# Patient Record
Sex: FEMALE | Race: WHITE | NOT HISPANIC OR LATINO | Employment: OTHER | URBAN - NONMETROPOLITAN AREA
[De-identification: names, ages, dates, MRNs, and addresses within clinical notes are randomized per-mention and may not be internally consistent; named-entity substitution may affect disease eponyms.]

---

## 2023-02-27 LAB
ALANINE AMINOTRANSFERASE (SGPT) (U/L) IN SER/PLAS: 57 U/L (ref 7–45)
ALBUMIN (G/DL) IN SER/PLAS: 4.2 G/DL (ref 3.4–5)
ALKALINE PHOSPHATASE (U/L) IN SER/PLAS: 116 U/L (ref 33–136)
ANION GAP IN SER/PLAS: 16 MMOL/L (ref 10–20)
ASPARTATE AMINOTRANSFERASE (SGOT) (U/L) IN SER/PLAS: 53 U/L (ref 9–39)
BILIRUBIN TOTAL (MG/DL) IN SER/PLAS: 0.7 MG/DL (ref 0–1.2)
C REACTIVE PROTEIN (MG/L) IN SER/PLAS: 0.81 MG/DL
CALCIDIOL (25 OH VITAMIN D3) (NG/ML) IN SER/PLAS: 33 NG/ML
CALCIUM (MG/DL) IN SER/PLAS: 10.6 MG/DL (ref 8.6–10.3)
CARBON DIOXIDE, TOTAL (MMOL/L) IN SER/PLAS: 25 MMOL/L (ref 21–32)
CHLORIDE (MMOL/L) IN SER/PLAS: 101 MMOL/L (ref 98–107)
COMPLEMENT C3 (MG/DL) IN SER/PLAS: 206 MG/DL (ref 87–200)
COMPLEMENT C4 (MG/DL) IN SER/PLAS: 30 MG/DL (ref 10–50)
CREATININE (MG/DL) IN SER/PLAS: 0.97 MG/DL (ref 0.5–1.05)
CREATININE (MG/DL) IN URINE: 92.4 MG/DL (ref 20–320)
ERYTHROCYTE DISTRIBUTION WIDTH (RATIO) BY AUTOMATED COUNT: 13.7 % (ref 11.5–14.5)
ERYTHROCYTE MEAN CORPUSCULAR HEMOGLOBIN CONCENTRATION (G/DL) BY AUTOMATED: 32.7 G/DL (ref 32–36)
ERYTHROCYTE MEAN CORPUSCULAR VOLUME (FL) BY AUTOMATED COUNT: 94 FL (ref 80–100)
ERYTHROCYTES (10*6/UL) IN BLOOD BY AUTOMATED COUNT: 4.52 X10E12/L (ref 4–5.2)
GFR FEMALE: 62 ML/MIN/1.73M2
GLUCOSE (MG/DL) IN SER/PLAS: 166 MG/DL (ref 74–99)
HEMATOCRIT (%) IN BLOOD BY AUTOMATED COUNT: 42.5 % (ref 36–46)
HEMOGLOBIN (G/DL) IN BLOOD: 13.9 G/DL (ref 12–16)
LEUKOCYTES (10*3/UL) IN BLOOD BY AUTOMATED COUNT: 8 X10E9/L (ref 4.4–11.3)
PARATHYRIN INTACT (PG/ML) IN SER/PLAS: 16.7 PG/ML (ref 18.5–88)
PLATELETS (10*3/UL) IN BLOOD AUTOMATED COUNT: 273 X10E9/L (ref 150–450)
POTASSIUM (MMOL/L) IN SER/PLAS: 5.1 MMOL/L (ref 3.5–5.3)
PROTEIN (MG/DL) IN URINE: 59 MG/DL (ref 5–24)
PROTEIN TOTAL: 7.9 G/DL (ref 6.4–8.2)
PROTEIN/CREATININE (MG/MG) IN URINE: 0.64 MG/MG CREAT (ref 0–0.17)
SEDIMENTATION RATE, ERYTHROCYTE: 45 MM/H (ref 0–30)
SODIUM (MMOL/L) IN SER/PLAS: 137 MMOL/L (ref 136–145)
UREA NITROGEN (MG/DL) IN SER/PLAS: 33 MG/DL (ref 6–23)

## 2023-02-28 LAB
ANA PATTERN: ABNORMAL
ANA TITER: ABNORMAL
ANTI-CENTROMERE: <0.2 AI
ANTI-CHROMATIN: 0.2 AI
ANTI-DNA (DS): 1 IU/ML
ANTI-JO-1 IGG: <0.2 AI
ANTI-NUCLEAR ANTIBODY (ANA): POSITIVE
ANTI-RIBOSOMAL P: <0.2 AI
ANTI-RNP: 0.5 AI
ANTI-SCL-70: <0.2 AI
ANTI-SM/RNP: <0.2 AI
ANTI-SM: <0.2 AI
ANTI-SSA: <0.2 AI
ANTI-SSB: <0.2 AI

## 2023-03-20 LAB
ALANINE AMINOTRANSFERASE (SGPT) (U/L) IN SER/PLAS: 55 U/L (ref 7–45)
ALBUMIN (G/DL) IN SER/PLAS: 3.8 G/DL (ref 3.4–5)
ALKALINE PHOSPHATASE (U/L) IN SER/PLAS: 144 U/L (ref 33–136)
ASPARTATE AMINOTRANSFERASE (SGOT) (U/L) IN SER/PLAS: 52 U/L (ref 9–39)
BILIRUBIN DIRECT (MG/DL) IN SER/PLAS: 0.2 MG/DL (ref 0–0.3)
BILIRUBIN TOTAL (MG/DL) IN SER/PLAS: 0.7 MG/DL (ref 0–1.2)
HEPATITIS A VIRUS IGM AB PRESENCE IN SER/PLAS BY IMMUNOASSAY: NONREACTIVE
HEPATITIS B VIRUS CORE IGM AB PRESENCE IN SER/PLAS BY IMMUNOASSY: NONREACTIVE
HEPATITIS B VIRUS SURFACE AG PRESENCE IN SERUM: NONREACTIVE
HEPATITIS C VIRUS AB PRESENCE IN SERUM: NONREACTIVE
POC CALCIUM IONIZED (MMOL/L) IN BLOOD: 1.21 MMOL/L (ref 1.1–1.33)
PROTEIN TOTAL: 7.9 G/DL (ref 6.4–8.2)

## 2023-03-21 LAB
ANTI-SMOOTH MUSCLE ANTIBODY: NEGATIVE
MITOCHONDRIAL ANTIBODY: NEGATIVE

## 2023-03-23 LAB — VITAMIN D 1,25-DIHYDROXY: 21.8 PG/ML (ref 19.9–79.3)

## 2023-03-24 LAB — PTH-RELATED PEPTIDE, PLASMA: 0.9 PMOL/L

## 2023-04-10 DIAGNOSIS — I15.9 SECONDARY HYPERTENSION: ICD-10-CM

## 2023-04-17 RX ORDER — METOPROLOL TARTRATE 100 MG/1
TABLET ORAL
Qty: 180 TABLET | Refills: 0 | Status: SHIPPED | OUTPATIENT
Start: 2023-04-17 | End: 2023-04-18 | Stop reason: SDUPTHER

## 2023-04-18 ENCOUNTER — OFFICE VISIT (OUTPATIENT)
Dept: PRIMARY CARE | Facility: CLINIC | Age: 73
End: 2023-04-18
Payer: MEDICARE

## 2023-04-18 VITALS
WEIGHT: 228 LBS | HEART RATE: 79 BPM | OXYGEN SATURATION: 97 % | DIASTOLIC BLOOD PRESSURE: 84 MMHG | BODY MASS INDEX: 37.99 KG/M2 | HEIGHT: 65 IN | RESPIRATION RATE: 18 BRPM | TEMPERATURE: 96.4 F | SYSTOLIC BLOOD PRESSURE: 132 MMHG

## 2023-04-18 DIAGNOSIS — E78.5 HYPERLIPIDEMIA, UNSPECIFIED HYPERLIPIDEMIA TYPE: ICD-10-CM

## 2023-04-18 DIAGNOSIS — M10.9 GOUT, UNSPECIFIED CAUSE, UNSPECIFIED CHRONICITY, UNSPECIFIED SITE: ICD-10-CM

## 2023-04-18 DIAGNOSIS — M62.838 MUSCLE SPASM: Primary | ICD-10-CM

## 2023-04-18 DIAGNOSIS — Z00.00 HEALTH CARE MAINTENANCE: ICD-10-CM

## 2023-04-18 DIAGNOSIS — I10 BENIGN ESSENTIAL HTN: ICD-10-CM

## 2023-04-18 DIAGNOSIS — J98.4 RESTRICTIVE LUNG DISEASE: ICD-10-CM

## 2023-04-18 DIAGNOSIS — K76.0 FATTY LIVER DISEASE, NONALCOHOLIC: ICD-10-CM

## 2023-04-18 DIAGNOSIS — R26.81 UNSTEADY: ICD-10-CM

## 2023-04-18 DIAGNOSIS — E03.9 HYPOTHYROIDISM, ADULT: ICD-10-CM

## 2023-04-18 DIAGNOSIS — M32.9 LUPUS (MULTI): ICD-10-CM

## 2023-04-18 DIAGNOSIS — M17.9 OSTEOARTHRITIS OF KNEE, UNSPECIFIED LATERALITY, UNSPECIFIED OSTEOARTHRITIS TYPE: ICD-10-CM

## 2023-04-18 DIAGNOSIS — E11.9 TYPE 2 DIABETES MELLITUS WITHOUT COMPLICATION, WITHOUT LONG-TERM CURRENT USE OF INSULIN (MULTI): ICD-10-CM

## 2023-04-18 PROBLEM — E87.5 HYPERKALEMIA: Status: RESOLVED | Noted: 2023-04-18 | Resolved: 2023-04-18

## 2023-04-18 PROBLEM — H61.20 CERUMEN IMPACTION: Status: RESOLVED | Noted: 2023-04-18 | Resolved: 2023-04-18

## 2023-04-18 PROBLEM — H10.10 ALLERGIC CONJUNCTIVITIS: Status: ACTIVE | Noted: 2023-04-18

## 2023-04-18 PROBLEM — H61.22 CERUMEN DEBRIS ON TYMPANIC MEMBRANE OF LEFT EAR: Status: RESOLVED | Noted: 2023-04-18 | Resolved: 2023-04-18

## 2023-04-18 PROBLEM — J45.909 ASTHMA (HHS-HCC): Status: ACTIVE | Noted: 2023-04-18

## 2023-04-18 PROBLEM — N18.31 CHRONIC RENAL FAILURE, STAGE 3A (MULTI): Status: ACTIVE | Noted: 2023-04-18

## 2023-04-18 PROCEDURE — 4010F ACE/ARB THERAPY RXD/TAKEN: CPT | Performed by: INTERNAL MEDICINE

## 2023-04-18 PROCEDURE — 3079F DIAST BP 80-89 MM HG: CPT | Performed by: INTERNAL MEDICINE

## 2023-04-18 PROCEDURE — 3075F SYST BP GE 130 - 139MM HG: CPT | Performed by: INTERNAL MEDICINE

## 2023-04-18 PROCEDURE — 1159F MED LIST DOCD IN RCRD: CPT | Performed by: INTERNAL MEDICINE

## 2023-04-18 PROCEDURE — 1160F RVW MEDS BY RX/DR IN RCRD: CPT | Performed by: INTERNAL MEDICINE

## 2023-04-18 PROCEDURE — 1036F TOBACCO NON-USER: CPT | Performed by: INTERNAL MEDICINE

## 2023-04-18 PROCEDURE — 99214 OFFICE O/P EST MOD 30 MIN: CPT | Performed by: INTERNAL MEDICINE

## 2023-04-18 PROCEDURE — 1157F ADVNC CARE PLAN IN RCRD: CPT | Performed by: INTERNAL MEDICINE

## 2023-04-18 RX ORDER — HYDROXYCHLOROQUINE SULFATE 200 MG/1
1 TABLET, FILM COATED ORAL DAILY
COMMUNITY
End: 2024-02-26 | Stop reason: SDUPTHER

## 2023-04-18 RX ORDER — ATORVASTATIN CALCIUM 20 MG/1
20 TABLET, FILM COATED ORAL DAILY
COMMUNITY
End: 2023-06-19 | Stop reason: SDUPTHER

## 2023-04-18 RX ORDER — METFORMIN HYDROCHLORIDE 1000 MG/1
1000 TABLET ORAL
COMMUNITY
End: 2023-06-19 | Stop reason: SDUPTHER

## 2023-04-18 RX ORDER — METOPROLOL TARTRATE 100 MG/1
50 TABLET ORAL 2 TIMES DAILY
Qty: 180 TABLET | Refills: 0 | Status: SHIPPED | OUTPATIENT
Start: 2023-04-18 | End: 2023-06-19 | Stop reason: SDUPTHER

## 2023-04-18 RX ORDER — LOSARTAN POTASSIUM 50 MG/1
50 TABLET ORAL 2 TIMES DAILY
COMMUNITY
End: 2023-06-19 | Stop reason: SDUPTHER

## 2023-04-18 RX ORDER — CLOTRIMAZOLE 1 %
CREAM (GRAM) TOPICAL
COMMUNITY
Start: 2023-02-27 | End: 2024-05-30 | Stop reason: ALTCHOICE

## 2023-04-18 RX ORDER — LATANOPROST 50 UG/ML
SOLUTION/ DROPS OPHTHALMIC
COMMUNITY

## 2023-04-18 RX ORDER — DAPAGLIFLOZIN 5 MG/1
1 TABLET, FILM COATED ORAL
COMMUNITY
Start: 2022-08-17 | End: 2023-06-19 | Stop reason: SDUPTHER

## 2023-04-18 RX ORDER — MELOXICAM 7.5 MG/1
7.5 TABLET ORAL DAILY PRN
COMMUNITY
End: 2023-11-22 | Stop reason: ALTCHOICE

## 2023-04-18 RX ORDER — LEVOTHYROXINE SODIUM 100 UG/1
1 TABLET ORAL DAILY
COMMUNITY
Start: 2023-02-09 | End: 2023-06-19 | Stop reason: SDUPTHER

## 2023-04-18 RX ORDER — ALLOPURINOL 100 MG/1
300 TABLET ORAL DAILY
COMMUNITY
End: 2023-06-19 | Stop reason: SDUPTHER

## 2023-04-18 RX ORDER — VITAMIN B COMPLEX
1 CAPSULE ORAL DAILY
Qty: 30 CAPSULE | Refills: 11 | Status: SHIPPED | OUTPATIENT
Start: 2023-04-18 | End: 2023-06-19 | Stop reason: ALTCHOICE

## 2023-04-18 NOTE — PROGRESS NOTES
"Subjective   Patient ID:   Yolanda Zabala is a 73 y.o. female, history of DM II, hypothyroidism, gout, HLD, SLE, asthma, who presents for Follow-up (6months )    Noted patient is difficult historian    Patient c/o of \"feeling wobbly\"  - ongoing in the past several weeks  - on 4/16/23, patient didn't feel like getting up, c/o difficulty walking, and c/o shortness of breath with exertion  - denies vertigo    Osteoarthritis s/p surgery  - surgery was 4/7/21, performed by Dr. Keanu Agosto  - last seen 7/23/21, have follow up in October 19th 2021  - s/p physical therapy, currently off   - felt better after surgery   - on meloxicam 7.5 mg PRN    DM II  - on metformin 1000 mg bid  - on farxiga 5 mg qd, tolerated well , pioglitazone was d/c'ed on prior visit   - followed up by podiatrist (Mendelzoon)  Lab Results   Component Value Date    HGBA1C 6.0 (A) 08/08/2022     HLD  - on atorvastatin 20 mg qd     Hypothyroidism  - on levothyroxine 100 mcg every day   - last TSH was normal on 9/21/21    HTN  - on losartan 50 mg bid  - on metoprolol 100 mg BID    Restrictive pulmonary disease  - on montelukast  - on albuterol PRN  - was seen by Dr. Espinoza in the past , last seen ~2008  - last PFT on 9/10/21 noted restrictive disease concerning for ILD  - currently follows with Dr. Gonzales, last seen 2/2023, follow up in 6 months  - plan to repeat CT in 1/2024    Gout  - uric acid was elevated up to 7 on 8/2022, allopurinol have increased since to 300 mg qd    Lupus   - last seen was 11/2020 by Dr. Mccabe  - follow up yearly   - on hydroxychloroquine 200 mg qd     Concern of muscle spasms  - patient c/o multiple \"polina horses\"  - wake up patient at night  - occasionally occurred during daytime  -occurred almost daily   - have been drinking more water    Concern of blurry eyes  - c/o intermittent blurry eyes of the left side  - followed by optometrist  - was told possibly starting cataract or glaucoma    Referral to " hepatologist  - patient have appointment with Dr. Froylan Aponte, in 8/2023 (phone #[phone])  - patient with mid elevation of AST/ALT, last was in the 50s  - ordered by Dr. Mccabe     All other (10) organ systems have been reviewed, negative for significant complaint and no change from baseline other than mentioned as per HPI above.    Patient Active Problem List   Diagnosis    Muscle spasm    Benign essential HTN    Restrictive lung disease    Allergic conjunctivitis    Gout    Hyperlipidemia    Hypothyroidism, adult    Type 2 diabetes mellitus without complication, without long-term current use of insulin (CMS/HCC)    Knee osteoarthritis    Fatty liver disease, nonalcoholic    Lupus (CMS/HCC)    Health care maintenance    Unsteady        Past Surgical History:   Procedure Laterality Date    OTHER SURGICAL HISTORY  07/27/2021    Knee replacement       No family history on file.    Social History    reports that she has never smoked. She has never used smokeless tobacco. She reports that she does not currently use alcohol. She reports that she does not currently use drugs.  Allergies   Allergen Reactions    Bacitracin Unknown    Penicillins Unknown     can't remember    Phenytoin Unknown    Sulfamethoxazole-Trimethoprim Unknown       Medication Documentation Review Audit       Reviewed by Zoë Fisher MD (Physician) on 04/19/23 at 1915      Medication Order Taking? Sig Documenting Provider Last Dose Status   allopurinol (Zyloprim) 100 mg tablet [id number] Yes Take 3 tablets (300 mg) by mouth once daily. Historical Provider, MD Taking Active   atorvastatin (Lipitor) 20 mg tablet [id number] Yes Take 1 tablet (20 mg) by mouth once daily. Historical Provider, MD Taking Active   b complex vitamins capsule [id number]  Take 1 capsule by mouth once daily. Zoë Fisher MD  Active   clotrimazole (Lotrimin) 1 % cream [id number] Yes APPLY CREAM TOPICALLY AND SPARINGLY TO THE AFFECTED AREA TWICE DAILY Historical  "Provider, MD Taking Active   Farxiga 5 mg [id number] Yes Take 1 tablet (5 mg) by mouth once daily in the morning. Take before meals. Historical Provider, MD Taking Active   hydroxychloroquine (Plaquenil) 200 mg tablet [id number] Yes Take 1 tablet (200 mg) by mouth once daily. Historical Provider, MD Taking Active   latanoprost (Xalatan) 0.005 % ophthalmic solution [id number] Yes INSTILL 1 DROP INTO EACH EYE ONCE DAILY AT NIGHT Historical Provider, MD Taking Active   levothyroxine (Synthroid, Levoxyl) 100 mcg tablet [id number] Yes Take 1 tablet (100 mcg) by mouth once daily. Historical Provider, MD Taking Active   losartan (Cozaar) 50 mg tablet [id number] Yes Take 1 tablet (50 mg) by mouth in the morning and 1 tablet (50 mg) before bedtime. Historical Provider, MD Taking Active   meloxicam (Mobic) 7.5 mg tablet [id number] Yes Take 1 tablet (7.5 mg) by mouth once daily as needed. Historical Provider, MD Taking Active   metFORMIN (Glucophage) 1,000 mg tablet [id number] Yes Take 1 tablet (1,000 mg) by mouth in the morning and 1 tablet (1,000 mg) in the evening. Take with meals. Take with morning and evening meal.. Historical Provider, MD Taking Active   Discontinued 04/18/23 1023   metoprolol tartrate (Lopressor) 100 mg tablet [id number]  Take 0.5 tablets (50 mg) by mouth in the morning and 0.5 tablets (50 mg) before bedtime. Zoë Fisher MD  Active                     Objective       2/14/2022    10:08 AM 4/28/2022    10:14 AM 7/14/2022     9:50 AM 7/19/2022     9:38 AM 8/17/2022    11:26 AM 10/13/2022     9:13 AM 4/18/2023     9:43 AM   Vitals   Systolic 143 138 130 130 142 136 132   Diastolic 79 78 72 84 90 82 84   Heart Rate 67 78 68 53 80 71 79   Temp  36.4 °C (97.6 °F) 35.8 °C (96.4 °F)  36.8 °C (98.2 °F) 36.3 °C (97.3 °F) 35.8 °C (96.4 °F)   Resp 16   16 16 16 18   Height (in) 1.651 m (5' 5\") 1.651 m (5' 5\")  1.651 m (5' 5\") 1.651 m (5' 5\") 1.651 m (5' 5\") 1.651 m (5' 5\")   Weight (lb) 261.38 260 238 243.38 240 244 228 "   BMI 43.5 kg/m2 43.27 kg/m2 39.61 kg/m2 40.5 kg/m2 39.94 kg/m2 40.6 kg/m2 37.94 kg/m2   BSA (m2) 2.34 m2 2.33 m2 2.23 m2 2.25 m2 2.24 m2 2.26 m2 2.17 m2   Visit Report       Report     Physical Exam  Constitutional:       General: She is not in acute distress.     Appearance: Normal appearance. She is not ill-appearing or toxic-appearing.   HENT:      Head: Normocephalic.   Eyes:      Extraocular Movements: Extraocular movements intact.      Conjunctiva/sclera: Conjunctivae normal.   Cardiovascular:      Rate and Rhythm: Normal rate and regular rhythm.      Heart sounds: Normal heart sounds. No murmur heard.     No friction rub. No gallop.   Pulmonary:      Effort: Pulmonary effort is normal. No respiratory distress.      Breath sounds: Normal breath sounds. No stridor. No wheezing, rhonchi or rales.   Abdominal:      General: Abdomen is flat. There is no distension.      Palpations: Abdomen is soft.      Tenderness: There is no abdominal tenderness. There is no guarding.   Musculoskeletal:         General: Normal range of motion.   Skin:     General: Skin is warm and dry.   Neurological:      General: No focal deficit present.      Mental Status: She is alert. Mental status is at baseline.         Assessment/Plan     Yolanda Zabala is a 73 y.o. female, history of DM II, hypothyroidism, gout, HLD, SLE, asthma, who presents for Follow-up (6months )    Problem List Items Addressed This Visit       Muscle spasm - Primary     - unclear etiology. But possible due to deconditioned  - electrolytes unremarkable  - will do vitamins as below for trial, can also do trial of magnesium          Relevant Medications    b complex vitamins capsule    Benign essential HTN     - c/w losartan 50 mg bid  - decrease metoprolol from 100 mg BID to 50 mg BID  - controlled on this visit          Relevant Medications    metoprolol tartrate (Lopressor) 100 mg tablet    Other Relevant Orders    Comprehensive Metabolic Panel    CBC and Auto  Differential    Restrictive lung disease     - c/w montelukast  - c/w albuterol PRN  - PFT 9/2021 noted restrictive disease  - had CT scan done on 7/12/22, unchanged stable nodule  - advised to follow up pulm , last seen 2/2023, follow up in 8/2023, repeat CT due 1/2024   - also had sleep study which was done one 1/2022, negative for sleep apnea          Gout     - c/w allopurinol to 300 mg qd  - follow up uric acid level            Relevant Orders    Uric acid    Hyperlipidemia     - c/w atorvastatin 20 mg qd   - LDL 70 on lipid panel 8/2022         Hypothyroidism, adult     - c/w levothyroxine 100 mcg every day  - f/u TSH          Relevant Orders    Tsh With Reflex To Free T4 If Abnormal    Type 2 diabetes mellitus without complication, without long-term current use of insulin (CMS/HCC)     - c/w metformin 1000 mg bid  - c/w Farxiga 5 mg qd   - A1C down to 6% on 8/2022, ordered repeat   - advised to follow up podiatrist, Dr. Mendelzoon, seen within a year  - advised to follow up yearly ophthalmologist  - have lost weight, advised to continue   - UACR elevated on visit 8/2022         Relevant Orders    Hemoglobin A1c    Knee osteoarthritis     - had knee surgery   - followed by Dr. Keanu Agosto   - s/p physical therapy, currently off          Fatty liver disease, nonalcoholic     - last U/S on 3/2023, noted fatty infiltration of the liver with hepatomegaly  - baseline AST/ALT around 50s  - have hepatologist referral, pending appointment with Dr. Aponte on 8/2023         Lupus (CMS/HCC)     - f/u Dr. Mccabe , last seen 2/2023  - on hydroxychloroquine 200 mg qd   - hx of lupus nephritis - previously has followed with Dr. Viveros for her kidney function.  - currently stable  - unclear if the pulmonary issue is related         Health care maintenance     [facility] 10/13/22  Aspirin for primary/secondary prophylaxis: out of age range   Diabetes Screening/monitoring: as above  Lipid screening: as above  STD/HIV Screening:  declined  Last Mammogram: follow up with Dr. Freedom Winters M.D. , last seen on 11/8/21,   Last Pap smear: out of age range, follow up Dr.. Freedom Winters M.D.  Lung Cancer Screening: never smoke  Hepatitis C Screening: negative 9/21/21  DEXA Scan : normal 11/2021, repeat 11/2023  Colonoscopy: last known colonoscopy was 10/2014 , due repeat in 2024  Vaccination : s/p 2x COVID vaccine, s/p PCV 23 and PCV 13, , ordered shingrix on prior visit , flu shot given 10/13/22         Unsteady     Unclear from history how patient is unsteady  - will do trial of decreasing beta blocker as below, to see if improve  - patient without any history of CHF or Afib/aflutter         Relevant Medications    metoprolol tartrate (Lopressor) 100 mg tablet     Follow up as noted below to check on symptoms     Future Appointments   Date Time Provider Department Center   5/23/2023  9:50 AM Zoë Fisher MD [address]

## 2023-04-19 ENCOUNTER — LAB (OUTPATIENT)
Dept: LAB | Facility: LAB | Age: 73
End: 2023-04-19
Payer: MEDICARE

## 2023-04-19 DIAGNOSIS — I10 BENIGN ESSENTIAL HTN: ICD-10-CM

## 2023-04-19 DIAGNOSIS — E03.9 HYPOTHYROIDISM, ADULT: ICD-10-CM

## 2023-04-19 DIAGNOSIS — N18.31 CHRONIC RENAL FAILURE, STAGE 3A (MULTI): ICD-10-CM

## 2023-04-19 DIAGNOSIS — E11.9 TYPE 2 DIABETES MELLITUS WITHOUT COMPLICATION, WITHOUT LONG-TERM CURRENT USE OF INSULIN (MULTI): ICD-10-CM

## 2023-04-19 PROBLEM — M17.9 KNEE OSTEOARTHRITIS: Status: ACTIVE | Noted: 2023-04-19

## 2023-04-19 PROBLEM — K76.0 FATTY LIVER DISEASE, NONALCOHOLIC: Status: ACTIVE | Noted: 2023-04-19

## 2023-04-19 PROBLEM — R26.81 UNSTEADY: Status: ACTIVE | Noted: 2023-04-19

## 2023-04-19 PROBLEM — J98.4 RESTRICTIVE LUNG DISEASE: Status: ACTIVE | Noted: 2023-04-18

## 2023-04-19 PROBLEM — M32.9 LUPUS (MULTI): Status: ACTIVE | Noted: 2023-04-19

## 2023-04-19 PROBLEM — Z00.00 HEALTH CARE MAINTENANCE: Status: ACTIVE | Noted: 2023-04-19

## 2023-04-19 LAB
ALANINE AMINOTRANSFERASE (SGPT) (U/L) IN SER/PLAS: 47 U/L (ref 7–45)
ALBUMIN (G/DL) IN SER/PLAS: 3.9 G/DL (ref 3.4–5)
ALKALINE PHOSPHATASE (U/L) IN SER/PLAS: 125 U/L (ref 33–136)
ANION GAP IN SER/PLAS: 18 MMOL/L (ref 10–20)
ASPARTATE AMINOTRANSFERASE (SGOT) (U/L) IN SER/PLAS: 61 U/L (ref 9–39)
BASOPHILS (10*3/UL) IN BLOOD BY AUTOMATED COUNT: 0.08 X10E9/L (ref 0–0.1)
BASOPHILS/100 LEUKOCYTES IN BLOOD BY AUTOMATED COUNT: 1.2 % (ref 0–2)
BILIRUBIN TOTAL (MG/DL) IN SER/PLAS: 0.7 MG/DL (ref 0–1.2)
CALCIUM (MG/DL) IN SER/PLAS: 9.6 MG/DL (ref 8.6–10.3)
CARBON DIOXIDE, TOTAL (MMOL/L) IN SER/PLAS: 26 MMOL/L (ref 21–32)
CHLORIDE (MMOL/L) IN SER/PLAS: 98 MMOL/L (ref 98–107)
CREATININE (MG/DL) IN SER/PLAS: 1.01 MG/DL (ref 0.5–1.05)
EOSINOPHILS (10*3/UL) IN BLOOD BY AUTOMATED COUNT: 0.34 X10E9/L (ref 0–0.4)
EOSINOPHILS/100 LEUKOCYTES IN BLOOD BY AUTOMATED COUNT: 5.2 % (ref 0–6)
ERYTHROCYTE DISTRIBUTION WIDTH (RATIO) BY AUTOMATED COUNT: 13.3 % (ref 11.5–14.5)
ERYTHROCYTE MEAN CORPUSCULAR HEMOGLOBIN CONCENTRATION (G/DL) BY AUTOMATED: 30.8 G/DL (ref 32–36)
ERYTHROCYTE MEAN CORPUSCULAR VOLUME (FL) BY AUTOMATED COUNT: 97 FL (ref 80–100)
ERYTHROCYTES (10*6/UL) IN BLOOD BY AUTOMATED COUNT: 4.68 X10E12/L (ref 4–5.2)
GFR FEMALE: 59 ML/MIN/1.73M2
GLUCOSE (MG/DL) IN SER/PLAS: 406 MG/DL (ref 74–99)
HEMATOCRIT (%) IN BLOOD BY AUTOMATED COUNT: 45.4 % (ref 36–46)
HEMOGLOBIN (G/DL) IN BLOOD: 14 G/DL (ref 12–16)
IMMATURE GRANULOCYTES/100 LEUKOCYTES IN BLOOD BY AUTOMATED COUNT: 0.3 % (ref 0–0.9)
LEUKOCYTES (10*3/UL) IN BLOOD BY AUTOMATED COUNT: 6.5 X10E9/L (ref 4.4–11.3)
LYMPHOCYTES (10*3/UL) IN BLOOD BY AUTOMATED COUNT: 1.91 X10E9/L (ref 0.8–3)
LYMPHOCYTES/100 LEUKOCYTES IN BLOOD BY AUTOMATED COUNT: 29.2 % (ref 13–44)
MONOCYTES (10*3/UL) IN BLOOD BY AUTOMATED COUNT: 0.58 X10E9/L (ref 0.05–0.8)
MONOCYTES/100 LEUKOCYTES IN BLOOD BY AUTOMATED COUNT: 8.9 % (ref 2–10)
NEUTROPHILS (10*3/UL) IN BLOOD BY AUTOMATED COUNT: 3.6 X10E9/L (ref 1.6–5.5)
NEUTROPHILS/100 LEUKOCYTES IN BLOOD BY AUTOMATED COUNT: 55.2 % (ref 40–80)
PLATELETS (10*3/UL) IN BLOOD AUTOMATED COUNT: 260 X10E9/L (ref 150–450)
POTASSIUM (MMOL/L) IN SER/PLAS: 5 MMOL/L (ref 3.5–5.3)
PROTEIN TOTAL: 7.5 G/DL (ref 6.4–8.2)
SODIUM (MMOL/L) IN SER/PLAS: 137 MMOL/L (ref 136–145)
THYROTROPIN (MIU/L) IN SER/PLAS BY DETECTION LIMIT <= 0.05 MIU/L: 1.83 MIU/L (ref 0.44–3.98)
URATE (MG/DL) IN SER/PLAS: 4.9 MG/DL (ref 2.3–6.7)
UREA NITROGEN (MG/DL) IN SER/PLAS: 22 MG/DL (ref 6–23)

## 2023-04-19 PROCEDURE — 84443 ASSAY THYROID STIM HORMONE: CPT

## 2023-04-19 PROCEDURE — 85025 COMPLETE CBC W/AUTO DIFF WBC: CPT

## 2023-04-19 PROCEDURE — 80053 COMPREHEN METABOLIC PANEL: CPT

## 2023-04-19 PROCEDURE — 84550 ASSAY OF BLOOD/URIC ACID: CPT

## 2023-04-19 PROCEDURE — 36415 COLL VENOUS BLD VENIPUNCTURE: CPT

## 2023-04-19 PROCEDURE — 83036 HEMOGLOBIN GLYCOSYLATED A1C: CPT

## 2023-04-19 NOTE — ASSESSMENT & PLAN NOTE
- c/w metformin 1000 mg bid  - c/w Farxiga 5 mg qd   - A1C down to 6% on 8/2022, ordered repeat   - advised to follow up podiatrist, Dr. Mendelzoon, seen within a year  - advised to follow up yearly ophthalmologist  - have lost weight, advised to continue   - UACR elevated on visit 8/2022

## 2023-04-19 NOTE — ASSESSMENT & PLAN NOTE
- unclear etiology. But possible due to deconditioned  - electrolytes unremarkable  - will do vitamins as below for trial, can also do trial of magnesium

## 2023-04-19 NOTE — ASSESSMENT & PLAN NOTE
- f/u Dr. Mccabe , last seen 2/2023  - on hydroxychloroquine 200 mg qd   - hx of lupus nephritis - previously has followed with Dr. Viveros for her kidney function.  - currently stable  - unclear if the pulmonary issue is related

## 2023-04-19 NOTE — ASSESSMENT & PLAN NOTE
Unclear from history how patient is unsteady  - will do trial of decreasing beta blocker as below, to see if improve  - patient without any history of CHF or Afib/aflutter

## 2023-04-19 NOTE — ASSESSMENT & PLAN NOTE
[facility] 10/13/22  Aspirin for primary/secondary prophylaxis: out of age range   Diabetes Screening/monitoring: as above  Lipid screening: as above  STD/HIV Screening: declined  Last Mammogram: follow up with Dr. Freedom Winters M.D. , last seen on 11/8/21,   Last Pap smear: out of age range, follow up Dr.. Freedom Winters M.D.  Lung Cancer Screening: never smoke  Hepatitis C Screening: negative 9/21/21  DEXA Scan : normal 11/2021, repeat 11/2023  Colonoscopy: last known colonoscopy was 10/2014 , due repeat in 2024  Vaccination : s/p 2x COVID vaccine, s/p PCV 23 and PCV 13, , ordered shingrix on prior visit , flu shot given 10/13/22

## 2023-04-19 NOTE — ASSESSMENT & PLAN NOTE
- patient w/ hx of lupus nephritis  Lab Results   Component Value Date    GLUCOSE 166 (H) 02/27/2023    CALCIUM 10.6 (H) 02/27/2023     02/27/2023    K 5.1 02/27/2023    CO2 25 02/27/2023     02/27/2023    BUN 33 (H) 02/27/2023    CREATININE 0.97 02/27/2023     - noted last creatinine unremarkable

## 2023-04-19 NOTE — ASSESSMENT & PLAN NOTE
- c/w losartan 50 mg bid  - decrease metoprolol from 100 mg BID to 50 mg BID  - controlled on this visit

## 2023-04-19 NOTE — ASSESSMENT & PLAN NOTE
- c/w montelukast  - c/w albuterol PRN  - PFT 9/2021 noted restrictive disease  - had CT scan done on 7/12/22, unchanged stable nodule  - advised to follow up pulm , last seen 2/2023, follow up in 8/2023, repeat CT due 1/2024   - also had sleep study which was done one 1/2022, negative for sleep apnea

## 2023-04-19 NOTE — ASSESSMENT & PLAN NOTE
- last U/S on 3/2023, noted fatty infiltration of the liver with hepatomegaly  - baseline AST/ALT around 50s  - have hepatologist referral, pending appointment with Dr. Aponte on 8/2023

## 2023-04-20 LAB
ESTIMATED AVERAGE GLUCOSE FOR HBA1C: 306 MG/DL
HEMOGLOBIN A1C/HEMOGLOBIN TOTAL IN BLOOD: 12.3 %

## 2023-04-24 RX ORDER — FLASH GLUCOSE SCANNING READER
EACH MISCELLANEOUS
Qty: 1 EACH | Refills: 0 | Status: SHIPPED | OUTPATIENT
Start: 2023-04-24 | End: 2023-04-24 | Stop reason: SDUPTHER

## 2023-04-24 RX ORDER — FLASH GLUCOSE SENSOR
KIT MISCELLANEOUS
Qty: 1 EACH | Refills: 0 | Status: SHIPPED | OUTPATIENT
Start: 2023-04-24 | End: 2023-04-24 | Stop reason: SDUPTHER

## 2023-04-24 RX ORDER — FLASH GLUCOSE SCANNING READER
EACH MISCELLANEOUS
Qty: 1 EACH | Refills: 0 | Status: SHIPPED | OUTPATIENT
Start: 2023-04-24 | End: 2023-05-01 | Stop reason: SDUPTHER

## 2023-04-24 RX ORDER — FLASH GLUCOSE SENSOR
KIT MISCELLANEOUS
Qty: 1 EACH | Refills: 0 | Status: SHIPPED | OUTPATIENT
Start: 2023-04-24 | End: 2023-05-01 | Stop reason: SDUPTHER

## 2023-04-24 NOTE — RESULT ENCOUNTER NOTE
Blood sugar is uncontrolled, can patient make appointment to be seen in person? I also sent continuous glucose monitor to help with management, if possible, obtain the device prior to appointment

## 2023-05-01 ENCOUNTER — OFFICE VISIT (OUTPATIENT)
Dept: PRIMARY CARE | Facility: CLINIC | Age: 73
End: 2023-05-01
Payer: MEDICARE

## 2023-05-01 VITALS
DIASTOLIC BLOOD PRESSURE: 72 MMHG | WEIGHT: 225 LBS | SYSTOLIC BLOOD PRESSURE: 122 MMHG | HEIGHT: 65 IN | RESPIRATION RATE: 18 BRPM | TEMPERATURE: 92.8 F | BODY MASS INDEX: 37.49 KG/M2

## 2023-05-01 DIAGNOSIS — E11.9 TYPE 2 DIABETES MELLITUS WITHOUT COMPLICATION, WITHOUT LONG-TERM CURRENT USE OF INSULIN (MULTI): Primary | ICD-10-CM

## 2023-05-01 PROCEDURE — 1159F MED LIST DOCD IN RCRD: CPT | Performed by: INTERNAL MEDICINE

## 2023-05-01 PROCEDURE — 3046F HEMOGLOBIN A1C LEVEL >9.0%: CPT | Performed by: INTERNAL MEDICINE

## 2023-05-01 PROCEDURE — 3078F DIAST BP <80 MM HG: CPT | Performed by: INTERNAL MEDICINE

## 2023-05-01 PROCEDURE — 3074F SYST BP LT 130 MM HG: CPT | Performed by: INTERNAL MEDICINE

## 2023-05-01 PROCEDURE — 4010F ACE/ARB THERAPY RXD/TAKEN: CPT | Performed by: INTERNAL MEDICINE

## 2023-05-01 PROCEDURE — 1160F RVW MEDS BY RX/DR IN RCRD: CPT | Performed by: INTERNAL MEDICINE

## 2023-05-01 PROCEDURE — 1036F TOBACCO NON-USER: CPT | Performed by: INTERNAL MEDICINE

## 2023-05-01 PROCEDURE — 99215 OFFICE O/P EST HI 40 MIN: CPT | Performed by: INTERNAL MEDICINE

## 2023-05-01 PROCEDURE — 1157F ADVNC CARE PLAN IN RCRD: CPT | Performed by: INTERNAL MEDICINE

## 2023-05-01 RX ORDER — FLASH GLUCOSE SENSOR
KIT MISCELLANEOUS
Qty: 1 EACH | Refills: 0 | Status: SHIPPED | OUTPATIENT
Start: 2023-05-01 | End: 2023-05-18 | Stop reason: SDUPTHER

## 2023-05-01 RX ORDER — FLASH GLUCOSE SCANNING READER
EACH MISCELLANEOUS
Qty: 1 EACH | Refills: 0 | Status: SHIPPED | OUTPATIENT
Start: 2023-05-01 | End: 2023-05-18 | Stop reason: SDUPTHER

## 2023-05-01 NOTE — ASSESSMENT & PLAN NOTE
- c/w metformin 1000 mg bid  - c/w Farxiga 5 mg qd   - A1C is elevated to 12%  - advised to follow up podiatrist, Dr. Mendelzoon, seen within a year  - advised to follow up yearly ophthalmologist  - UACR elevated on visit 8/2022  - patient' glucose strip is not compatible with her glucometer, patient brought ReliON Prime strip, while she needed ReliOn Premier strip, re-sent as noted above  - also ordered CGM as noted above  - will do trial of Ozempic given elevated A1C, given sample and given first dose in the office 5/1/23  - advised to do once a week

## 2023-05-01 NOTE — PROGRESS NOTES
Subjective   Patient ID:   Yolanda Zabala is a 73 y.o. female, history of DM II, hypothyroidism, gout, HLD, SLE, asthma, who presents for Results (Bw )    Noted patient is difficult historian    DM II  - on metformin 1000 mg bid  - on farxiga 5 mg qd, tolerated well , pioglitazone was d/c'ed on prior visit   - followed up by podiatrist (Mendelzoon)  - elevated A1C  Lab Results   Component Value Date    HGBA1C 12.3 (A) 04/19/2023        - patient brought her glucose strips and glucometer    All other (10) organ systems have been reviewed, negative for significant complaint and no change from baseline other than mentioned as per HPI above.    Patient Active Problem List   Diagnosis    Muscle spasm    Benign essential HTN    Restrictive lung disease    Allergic conjunctivitis    Gout    Hyperlipidemia    Hypothyroidism, adult    Type 2 diabetes mellitus without complication, without long-term current use of insulin (CMS/[facility])    Knee osteoarthritis    Fatty liver disease, nonalcoholic    Lupus (CMS/[facility])    Health care maintenance    Unsteady        Past Surgical History:   Procedure Laterality Date    OTHER SURGICAL HISTORY  07/27/2021    Knee replacement       No family history on file.    Social History    reports that she has never smoked. She has never used smokeless tobacco. She reports that she does not currently use alcohol. She reports that she does not currently use drugs.  Allergies   Allergen Reactions    Bacitracin Unknown    Penicillins Unknown     can't remember    Phenytoin Unknown    Sulfamethoxazole-Trimethoprim Unknown       Medication Documentation Review Audit       Reviewed by Tiana Harman MA (Medical Assistant) on 05/01/23 at 1351      Medication Order Taking? Sig Documenting Provider Last Dose Status   allopurinol (Zyloprim) 100 mg tablet [id number] Yes Take 3 tablets (300 mg) by mouth once daily. Historical Provider, MD Taking Active   atorvastatin (Lipitor) 20 mg tablet [id number] Yes Take 1 tablet  (20 mg) by mouth once daily. Katy Gonzalez MD Taking Active   b complex vitamins capsule [id number] Yes Take 1 capsule by mouth once daily. Zoë Fisher MD Taking Active   clotrimazole (Lotrimin) 1 % cream [id number] Yes APPLY CREAM TOPICALLY AND SPARINGLY TO THE AFFECTED AREA TWICE DAILY Katy Gonzalez MD Taking Active   Farxiga 5 mg [id number] Yes Take 1 tablet (5 mg) by mouth once daily in the morning. Take before meals. Katy Gonzalez MD Taking Active   FreeStyle Mikhail reader (FreeStyle Mikhail 2 [place]) misc [id number] Yes Use as instructed Zoë Fisher MD Taking Active   FreeStyle Mikhail sensor system (FreeStyle Mikhail 2 Sensor) kit [id number] Yes Use as instructed Zoë Fisher MD Taking Active   hydroxychloroquine (Plaquenil) 200 mg tablet [id number] Yes Take 1 tablet (200 mg) by mouth once daily. Katy Gonzalez MD Taking Active   latanoprost (Xalatan) 0.005 % ophthalmic solution [id number] Yes INSTILL 1 DROP INTO EACH EYE ONCE DAILY AT NIGHT Katy Gonzalez MD Taking Active   levothyroxine (Synthroid, Levoxyl) 100 mcg tablet [id number] Yes Take 1 tablet (100 mcg) by mouth once daily. Katy Gonzalez MD Taking Active   losartan (Cozaar) 50 mg tablet [id number] Yes Take 1 tablet (50 mg) by mouth 2 times a day. Katy Gonzalez MD Taking Active   meloxicam (Mobic) 7.5 mg tablet [id number] Yes Take 1 tablet (7.5 mg) by mouth once daily as needed. Katy Gonzalez MD Taking Active   metFORMIN (Glucophage) 1,000 mg tablet [id number] Yes Take 1 tablet (1,000 mg) by mouth 2 times a day with meals. Take with morning and evening meal. Katy Gonzalez MD Taking Active   metoprolol tartrate (Lopressor) 100 mg tablet [id number] Yes Take 0.5 tablets (50 mg) by mouth in the morning and 0.5 tablets (50 mg) before bedtime. Zoë Fisher MD Taking Active                     Objective       4/28/2022    10:14 AM 7/14/2022     9:50 AM 7/19/2022     9:38 AM 8/17/2022    11:26 AM  "10/13/2022     9:13 AM 4/18/2023     9:43 AM 5/1/2023     1:51 PM   Vitals   Systolic 138 130 130 142 136 132 122   Diastolic 78 72 84 90 82 84 72   Heart Rate 78 68 53 80 71 79    Temp 36.4 °C (97.6 °F) 35.8 °C (96.4 °F)  36.8 °C (98.2 °F) 36.3 °C (97.3 °F) 35.8 °C (96.4 °F) 33.8 °C (92.8 °F)   Resp   16 16 16 18 18   Height (in) 1.651 m (5' 5\")  1.651 m (5' 5\") 1.651 m (5' 5\") 1.651 m (5' 5\") 1.651 m (5' 5\") 1.651 m (5' 5\")   Weight (lb) 260 238 243.38 240 244 228 225   BMI 43.27 kg/m2 39.61 kg/m2 40.5 kg/m2 39.94 kg/m2 40.6 kg/m2 37.94 kg/m2 37.44 kg/m2   BSA (m2) 2.33 m2 2.23 m2 2.25 m2 2.24 m2 2.26 m2 2.17 m2 2.16 m2   Visit Report      Report Report     Physical Exam  Constitutional:       General: She is not in acute distress.     Appearance: Normal appearance. She is not ill-appearing or toxic-appearing.   HENT:      Head: Normocephalic.   Eyes:      Extraocular Movements: Extraocular movements intact.      Conjunctiva/sclera: Conjunctivae normal.   Cardiovascular:      Rate and Rhythm: Normal rate and regular rhythm.      Heart sounds: Normal heart sounds. No murmur heard.     No friction rub. No gallop.   Pulmonary:      Effort: Pulmonary effort is normal. No respiratory distress.      Breath sounds: Normal breath sounds. No stridor. No wheezing, rhonchi or rales.   Abdominal:      General: Abdomen is flat. There is no distension.      Palpations: Abdomen is soft.      Tenderness: There is no abdominal tenderness. There is no guarding.   Musculoskeletal:         General: Normal range of motion.   Skin:     General: Skin is warm and dry.   Neurological:      General: No focal deficit present.      Mental Status: She is alert. Mental status is at baseline.       Lab on 04/19/2023   Component Date Value Ref Range Status    Glucose 04/19/2023 406 (H)  74 - 99 mg/dL Final    Sodium 04/19/2023 137  136 - 145 mmol/L Final    Potassium 04/19/2023 5.0  3.5 - 5.3 mmol/L Final    Chloride 04/19/2023 98  98 - 107 " mmol/L Final    Bicarbonate 04/19/2023 26  21 - 32 mmol/L Final    Anion Gap 04/19/2023 18  10 - 20 mmol/L Final    Urea Nitrogen 04/19/2023 22  6 - 23 mg/dL Final    Creatinine 04/19/2023 1.01  0.50 - 1.05 mg/dL Final    GFR Female 04/19/2023 59 (A)  >90 mL/min/1.73m2 Final     CALCULATIONS OF ESTIMATED GFR ARE PERFORMED   USING THE 2021 CKD-EPI STUDY REFIT EQUATION   WITHOUT THE RACE VARIABLE FOR THE IDMS-TRACEABLE   CREATININE METHODS.    https://jasn.asnjournals.org/content/early/2021/09/22/ASN.3329689515    Calcium 04/19/2023 9.6  8.6 - 10.3 mg/dL Final    Albumin 04/19/2023 3.9  3.4 - 5.0 g/dL Final    Alkaline Phosphatase 04/19/2023 125  33 - 136 U/L Final    Total Protein 04/19/2023 7.5  6.4 - 8.2 g/dL Final    AST 04/19/2023 61 (H)  9 - 39 U/L Final    Total Bilirubin 04/19/2023 0.7  0.0 - 1.2 mg/dL Final    ALT (SGPT) 04/19/2023 47 (H)  7 - 45 U/L Final     Patients treated with Sulfasalazine may generate    falsely decreased results for ALT.    Hemoglobin A1C 04/19/2023 12.3 (A)  % Final         Diagnosis of Diabetes-Adults   Non-Diabetic: < or = 5.6%   Increased risk for developing diabetes: 5.7-6.4%   Diagnostic of diabetes: > or = 6.5%  .       Monitoring of Diabetes                Age (y)     Therapeutic Goal (%)   Adults:          >18           <7.0   Pediatrics:    13-18           <7.5                   7-12           <8.0                   0- 6            7.5-8.5   American Diabetes Association. Diabetes Care 33(S1), Jan 2010.    Estimated Average Glucose 04/19/2023 306  MG/DL Final    WBC 04/19/2023 6.5  4.4 - 11.3 x10E9/L Final    RBC 04/19/2023 4.68  4.00 - 5.20 x10E12/L Final    Hemoglobin 04/19/2023 14.0  12.0 - 16.0 g/dL Final    Hematocrit 04/19/2023 45.4  36.0 - 46.0 % Final    MCV 04/19/2023 97  80 - 100 fL Final    MCHC 04/19/2023 30.8 (L)  32.0 - 36.0 g/dL Final    Platelets 04/19/2023 260  150 - 450 x10E9/L Final    RDW 04/19/2023 13.3  11.5 - 14.5 % Final    Neutrophils % 04/19/2023  55.2  40.0 - 80.0 % Final    Immature Granulocytes %, Automated 04/19/2023 0.3  0.0 - 0.9 % Final     Immature Granulocyte Count (IG) includes promyelocytes,    myelocytes and metamyelocytes but does not include bands.   Percent differential counts (%) should be interpreted in the   context of the absolute cell counts (cells/L).    Lymphocytes % 04/19/2023 29.2  13.0 - 44.0 % Final    Monocytes % 04/19/2023 8.9  2.0 - 10.0 % Final    Eosinophils % 04/19/2023 5.2  0.0 - 6.0 % Final    Basophils % 04/19/2023 1.2  0.0 - 2.0 % Final    Neutrophils Absolute 04/19/2023 3.60  1.60 - 5.50 x10E9/L Final    Lymphocytes Absolute 04/19/2023 1.91  0.80 - 3.00 x10E9/L Final    Monocytes Absolute 04/19/2023 0.58  0.05 - 0.80 x10E9/L Final    Eosinophils Absolute 04/19/2023 0.34  0.00 - 0.40 x10E9/L Final    Basophils Absolute 04/19/2023 0.08  0.00 - 0.10 x10E9/L Final    TSH 04/19/2023 1.83  0.44 - 3.98 mIU/L Final     TSH testing is performed using different testing    methodology at [facility] than at other    [facility]. Direct result comparisons should    only be made within the same method.    Uric Acid 04/19/2023 4.9  2.3 - 6.7 mg/dL Final     Venipuncture immediately after or during the    administration of Metamizole may lead to falsely   low results. Testing should be performed immediately   prior to Metamizole dosing.         Assessment/Plan     Yolanda Zabala is a 73 y.o. female, history of DM II, hypothyroidism, gout, HLD, SLE, asthma, who presents for Follow-up (6months )    Problem List Items Addressed This Visit       Type 2 diabetes mellitus without complication, without long-term current use of insulin (CMS/[facility]) - Primary     - c/w metformin 1000 mg bid  - c/w Farxiga 5 mg qd   - A1C is elevated to 12%  - advised to follow up podiatrist, Dr. Mendelzoon, seen within a year  - advised to follow up yearly ophthalmologist  - UACR elevated on visit 8/2022  - patient' glucose strip is not compatible  with her glucometer, patient brought ReliON Prime strip, while she needed ReliOn Premier strip, re-sent as noted above  - also ordered CGM as noted above  - will do trial of Ozempic given elevated A1C, given sample and given first dose in the office 5/1/23  - advised to do once a week         Relevant Medications    blood sugar diagnostic strip    FreeStyle Mikhail reader (FreeStyle Mikhail 2 [place]) misc    FreeStyle Mikhail sensor system (FreeStyle Mikhail 2 Sensor) kit    semaglutide 0.25 mg or 0.5 mg (2 mg/3 mL) pen injector     Follow up in 2 weeks to check progress on Ozempic , also sent to pharmacy to check on price  Spent time: 40 minutes with patient: attempted to show how glucometer work - however it was the wrong strip, explained about Ozempic and given first dose in the office     Future Appointments   Date Time Provider Department Center   5/15/2023  1:00 PM Zoë Fisher MD [address]

## 2023-05-15 ENCOUNTER — APPOINTMENT (OUTPATIENT)
Dept: PRIMARY CARE | Facility: CLINIC | Age: 73
End: 2023-05-15
Payer: MEDICARE

## 2023-05-18 ENCOUNTER — OFFICE VISIT (OUTPATIENT)
Dept: PRIMARY CARE | Facility: CLINIC | Age: 73
End: 2023-05-18
Payer: MEDICARE

## 2023-05-18 VITALS
RESPIRATION RATE: 18 BRPM | OXYGEN SATURATION: 96 % | DIASTOLIC BLOOD PRESSURE: 80 MMHG | BODY MASS INDEX: 37.28 KG/M2 | WEIGHT: 224 LBS | HEART RATE: 77 BPM | SYSTOLIC BLOOD PRESSURE: 126 MMHG

## 2023-05-18 DIAGNOSIS — I10 BENIGN ESSENTIAL HTN: Primary | ICD-10-CM

## 2023-05-18 DIAGNOSIS — E11.9 TYPE 2 DIABETES MELLITUS WITHOUT COMPLICATION, WITHOUT LONG-TERM CURRENT USE OF INSULIN (MULTI): ICD-10-CM

## 2023-05-18 PROCEDURE — 99214 OFFICE O/P EST MOD 30 MIN: CPT | Performed by: INTERNAL MEDICINE

## 2023-05-18 PROCEDURE — 1036F TOBACCO NON-USER: CPT | Performed by: INTERNAL MEDICINE

## 2023-05-18 PROCEDURE — 1157F ADVNC CARE PLAN IN RCRD: CPT | Performed by: INTERNAL MEDICINE

## 2023-05-18 PROCEDURE — 1159F MED LIST DOCD IN RCRD: CPT | Performed by: INTERNAL MEDICINE

## 2023-05-18 PROCEDURE — 3079F DIAST BP 80-89 MM HG: CPT | Performed by: INTERNAL MEDICINE

## 2023-05-18 PROCEDURE — 1160F RVW MEDS BY RX/DR IN RCRD: CPT | Performed by: INTERNAL MEDICINE

## 2023-05-18 PROCEDURE — 3074F SYST BP LT 130 MM HG: CPT | Performed by: INTERNAL MEDICINE

## 2023-05-18 PROCEDURE — 4010F ACE/ARB THERAPY RXD/TAKEN: CPT | Performed by: INTERNAL MEDICINE

## 2023-05-18 PROCEDURE — 3046F HEMOGLOBIN A1C LEVEL >9.0%: CPT | Performed by: INTERNAL MEDICINE

## 2023-05-18 RX ORDER — FLASH GLUCOSE SENSOR
KIT MISCELLANEOUS
Qty: 1 EACH | Refills: 0 | Status: SHIPPED | OUTPATIENT
Start: 2023-05-18 | End: 2023-05-30

## 2023-05-18 RX ORDER — FLASH GLUCOSE SCANNING READER
EACH MISCELLANEOUS
Qty: 1 EACH | Refills: 0 | Status: SHIPPED | OUTPATIENT
Start: 2023-05-18

## 2023-05-18 NOTE — PROGRESS NOTES
Subjective   Patient ID:   Yolanda Zabala is a 73 y.o. female, history of DM II, hypothyroidism, gout, HLD, SLE, asthma, who presents for Follow-up    Noted patient is difficult historian    DM II  - on metformin 1000 mg bid  - on farxiga 5 mg qd, tolerated well , pioglitazone was d/c'ed on prior visit   - followed up by podiatrist (Mendelzoon)  - elevated A1C  Lab Results   Component Value Date    HGBA1C 12.3 (A) 04/19/2023        - patient brought her glucose strips and glucometer      - before eating glucose is around 170 to 180s, patient's glucose after eating up to 300s, but mostly 250s , finally able to use glucometer with the right strip       - on patient's last visit on 5/1/23, patient was started on Ozempic, started on 0.25 mg qweekly     - was also ordered CGM, but haven't received yet      - patient also have been seeing nutritionists    HTN  - on losartan 50 mg bid  - on metoprolol 50 mg BID    All other (10) organ systems have been reviewed, negative for significant complaint and no change from baseline other than mentioned as per HPI above.    Patient Active Problem List   Diagnosis    Muscle spasm    Benign essential HTN    Restrictive lung disease    Allergic conjunctivitis    Gout    Hyperlipidemia    Hypothyroidism, adult    Type 2 diabetes mellitus without complication, without long-term current use of insulin (CMS/[facility])    Knee osteoarthritis    Fatty liver disease, nonalcoholic    Lupus (CMS/[facility])    Health care maintenance    Unsteady        Past Surgical History:   Procedure Laterality Date    OTHER SURGICAL HISTORY  07/27/2021    Knee replacement       No family history on file.    Social History    reports that she has never smoked. She has never used smokeless tobacco. She reports that she does not currently use alcohol. She reports that she does not currently use drugs.  Allergies   Allergen Reactions    Bacitracin Unknown    Penicillins Unknown     can't remember    Phenytoin Unknown     Sulfamethoxazole-Trimethoprim Unknown       Medication Documentation Review Audit       Reviewed by Zoë Fisher MD (Physician) on 05/18/23 at 2003      Medication Order Taking? Sig Documenting Provider Last Dose Status   allopurinol (Zyloprim) 100 mg tablet [id number] Yes Take 3 tablets (300 mg) by mouth once daily. Katy Gonzalez MD Taking Active   atorvastatin (Lipitor) 20 mg tablet [id number] Yes Take 1 tablet (20 mg) by mouth once daily. Katy Gonzalez MD Taking Active   b complex vitamins capsule [id number] Yes Take 1 capsule by mouth once daily. Zoë Fisher MD Taking Active   blood sugar diagnostic strip [id number]  Please prescribe strips compatible with Relion Premier Classic Zoë Fisher MD  Active   clotrimazole (Lotrimin) 1 % cream [id number]  APPLY CREAM TOPICALLY AND SPARINGLY TO THE AFFECTED AREA TWICE DAILY Katy Gonzalez MD  Active   Farxiga 5 mg [id number] Yes Take 1 tablet (5 mg) by mouth once daily in the morning. Take before meals. Katy Gonzalez MD Taking Active     Discontinued 05/18/23 1224   FreeStyle Mikhail reader (FreeStyle Mikhail 2 [place]) misc [id number]  Use as instructed Zoë Fisher MD  Active     Discontinued 05/18/23 1224   FreeStyle Mikhail sensor system (FreeStyle Mikhail 2 Sensor) kit [id number]  Use as instructed oZë Fisher MD  Active   hydroxychloroquine (Plaquenil) 200 mg tablet [id number] Yes Take 1 tablet (200 mg) by mouth once daily. Katy Gonzalez MD Taking Active   latanoprost (Xalatan) 0.005 % ophthalmic solution [id number] Yes INSTILL 1 DROP INTO EACH EYE ONCE DAILY AT NIGHT Katy Gonzalez MD Taking Active   levothyroxine (Synthroid, Levoxyl) 100 mcg tablet [id number] Yes Take 1 tablet (100 mcg) by mouth once daily. Katy Gonzalez MD Taking Active   losartan (Cozaar) 50 mg tablet [id number] Yes Take 1 tablet (50 mg) by mouth 2 times a day. Katy Gonzalez MD Taking Active   meloxicam (Mobic) 7.5 mg tablet [id number] Yes  "Take 1 tablet (7.5 mg) by mouth once daily as needed. Historical Provider, MD Taking Active   metFORMIN (Glucophage) 1,000 mg tablet [id number] Yes Take 1 tablet (1,000 mg) by mouth 2 times a day with meals. Take with morning and evening meal. Historical Provider, MD Taking Active   metoprolol tartrate (Lopressor) 100 mg tablet [id number] Yes Take 0.5 tablets (50 mg) by mouth in the morning and 0.5 tablets (50 mg) before bedtime. Zoë Fisher MD Taking Active   Discontinued 05/18/23 1224   semaglutide 0.25 mg or 0.5 mg (2 mg/3 mL) pen injector [id number]  Inject 0.25 mg under the skin 1 (one) time per week. Zoë Fisher MD  Active                     Objective       8/17/2022    11:26 AM 10/13/2022     9:13 AM 2/7/2023    10:02 AM 2/27/2023    10:23 AM 4/18/2023     9:43 AM 5/1/2023     1:51 PM 5/18/2023    12:04 PM   Vitals   Systolic 142 136 135 130 132 122 126   Diastolic 90 82 83 78 84 72 80   Heart Rate 80 71 62  79  77   Temp 36.8 °C (98.2 °F) 36.3 °C (97.3 °F)   35.8 °C (96.4 °F) 33.8 °C (92.8 °F)    Resp 16 16 16  18 18 18   Height (in) 1.651 m (5' 5\") 1.651 m (5' 5\") 1.651 m (5' 5\") 1.651 m (5' 5\") 1.651 m (5' 5\") 1.651 m (5' 5\")    Weight (lb) 240 244 242.38 238 228 225 224   BMI 39.94 kg/m2 40.6 kg/m2 40.33 kg/m2 39.61 kg/m2 37.94 kg/m2 37.44 kg/m2 37.28 kg/m2   BSA (m2) 2.24 m2 2.26 m2 2.25 m2 2.23 m2 2.17 m2 2.16 m2 2.16 m2   Visit Report     Report Report Report     Physical Exam  Constitutional:       General: She is not in acute distress.     Appearance: Normal appearance. She is not ill-appearing or toxic-appearing.   HENT:      Head: Normocephalic.   Eyes:      Extraocular Movements: Extraocular movements intact.      Conjunctiva/sclera: Conjunctivae normal.   Cardiovascular:      Rate and Rhythm: Normal rate and regular rhythm.      Heart sounds: Normal heart sounds. No murmur heard.     No friction rub. No gallop.   Pulmonary:      Effort: Pulmonary effort is normal. No respiratory " distress.      Breath sounds: Normal breath sounds. No stridor. No wheezing, rhonchi or rales.   Abdominal:      General: Abdomen is flat. There is no distension.      Palpations: Abdomen is soft.      Tenderness: There is no abdominal tenderness. There is no guarding.   Musculoskeletal:         General: Normal range of motion.   Skin:     General: Skin is warm and dry.   Neurological:      General: No focal deficit present.      Mental Status: She is alert. Mental status is at baseline.       Lab on 04/19/2023   Component Date Value Ref Range Status    Glucose 04/19/2023 406 (H)  74 - 99 mg/dL Final    Sodium 04/19/2023 137  136 - 145 mmol/L Final    Potassium 04/19/2023 5.0  3.5 - 5.3 mmol/L Final    Chloride 04/19/2023 98  98 - 107 mmol/L Final    Bicarbonate 04/19/2023 26  21 - 32 mmol/L Final    Anion Gap 04/19/2023 18  10 - 20 mmol/L Final    Urea Nitrogen 04/19/2023 22  6 - 23 mg/dL Final    Creatinine 04/19/2023 1.01  0.50 - 1.05 mg/dL Final    GFR Female 04/19/2023 59 (A)  >90 mL/min/1.73m2 Final     CALCULATIONS OF ESTIMATED GFR ARE PERFORMED   USING THE 2021 CKD-EPI STUDY REFIT EQUATION   WITHOUT THE RACE VARIABLE FOR THE IDMS-TRACEABLE   CREATININE METHODS.    https://jasn.asnjournals.org/content/early/2021/09/22/ASN.3464344693    Calcium 04/19/2023 9.6  8.6 - 10.3 mg/dL Final    Albumin 04/19/2023 3.9  3.4 - 5.0 g/dL Final    Alkaline Phosphatase 04/19/2023 125  33 - 136 U/L Final    Total Protein 04/19/2023 7.5  6.4 - 8.2 g/dL Final    AST 04/19/2023 61 (H)  9 - 39 U/L Final    Total Bilirubin 04/19/2023 0.7  0.0 - 1.2 mg/dL Final    ALT (SGPT) 04/19/2023 47 (H)  7 - 45 U/L Final     Patients treated with Sulfasalazine may generate    falsely decreased results for ALT.    Hemoglobin A1C 04/19/2023 12.3 (A)  % Final         Diagnosis of Diabetes-Adults   Non-Diabetic: < or = 5.6%   Increased risk for developing diabetes: 5.7-6.4%   Diagnostic of diabetes: > or = 6.5%  .       Monitoring of Diabetes                 Age (y)     Therapeutic Goal (%)   Adults:          >18           <7.0   Pediatrics:    13-18           <7.5                   7-12           <8.0                   0- 6            7.5-8.5   American Diabetes Association. Diabetes Care 33(S1), Jan 2010.    Estimated Average Glucose 04/19/2023 306  MG/DL Final    WBC 04/19/2023 6.5  4.4 - 11.3 x10E9/L Final    RBC 04/19/2023 4.68  4.00 - 5.20 x10E12/L Final    Hemoglobin 04/19/2023 14.0  12.0 - 16.0 g/dL Final    Hematocrit 04/19/2023 45.4  36.0 - 46.0 % Final    MCV 04/19/2023 97  80 - 100 fL Final    MCHC 04/19/2023 30.8 (L)  32.0 - 36.0 g/dL Final    Platelets 04/19/2023 260  150 - 450 x10E9/L Final    RDW 04/19/2023 13.3  11.5 - 14.5 % Final    Neutrophils % 04/19/2023 55.2  40.0 - 80.0 % Final    Immature Granulocytes %, Automated 04/19/2023 0.3  0.0 - 0.9 % Final     Immature Granulocyte Count (IG) includes promyelocytes,    myelocytes and metamyelocytes but does not include bands.   Percent differential counts (%) should be interpreted in the   context of the absolute cell counts (cells/L).    Lymphocytes % 04/19/2023 29.2  13.0 - 44.0 % Final    Monocytes % 04/19/2023 8.9  2.0 - 10.0 % Final    Eosinophils % 04/19/2023 5.2  0.0 - 6.0 % Final    Basophils % 04/19/2023 1.2  0.0 - 2.0 % Final    Neutrophils Absolute 04/19/2023 3.60  1.60 - 5.50 x10E9/L Final    Lymphocytes Absolute 04/19/2023 1.91  0.80 - 3.00 x10E9/L Final    Monocytes Absolute 04/19/2023 0.58  0.05 - 0.80 x10E9/L Final    Eosinophils Absolute 04/19/2023 0.34  0.00 - 0.40 x10E9/L Final    Basophils Absolute 04/19/2023 0.08  0.00 - 0.10 x10E9/L Final    TSH 04/19/2023 1.83  0.44 - 3.98 mIU/L Final     TSH testing is performed using different testing    methodology at [facility] than at other    [facility]. Direct result comparisons should    only be made within the same method.    Uric Acid 04/19/2023 4.9  2.3 - 6.7 mg/dL Final     Venipuncture immediately after or  during the    administration of Metamizole may lead to falsely   low results. Testing should be performed immediately   prior to Metamizole dosing.         Assessment/Plan     Yolanda Zabala is a 73 y.o. female, history of DM II, hypothyroidism, gout, HLD, SLE, asthma, who presents for Follow-up    Problem List Items Addressed This Visit       Benign essential HTN - Primary     - c/w losartan 50 mg bid  - c/w metoprolol  50 mg BID  - controlled on this visit          Type 2 diabetes mellitus without complication, without long-term current use of insulin (CMS/[facility])     - c/w metformin 1000 mg bid  - c/w Farxiga 5 mg qd   -   Lab Results   Component Value Date    HGBA1C 12.3 (A) 04/19/2023     - advised to follow up podiatrist, Dr. Mendelzoon, seen within a year  - advised to follow up yearly ophthalmologist  - UACR elevated on visit 8/2022  - CGM re-ordered  - ordered pharmacist referral  - Ozempic tolerated, continue at low dose 0.25 mg every week, will increase to 0.5 mg weekly  at next visit         Relevant Medications    semaglutide 0.25 mg or 0.5 mg (2 mg/3 mL) pen injector    FreeStyle Mikhail reader (FreeStyle Mikhail 2 [place]) misc    FreeStyle Mikhail sensor system (FreeStyle Mikhail 2 Sensor) kit    Other Relevant Orders    Referral to Clinical Pharmacy     Follow up in 4 weeks to check progress on Ozempic , also re-sent to pharmacy , if tolerated and covered, will increase ozempic to 0.5 mg qweekly  Future Appointments   Date Time Provider Department Center   6/19/2023 11:10 AM Zoë Fisher MD [address]

## 2023-05-19 NOTE — ASSESSMENT & PLAN NOTE
- c/w metformin 1000 mg bid  - c/w Farxiga 5 mg qd   -   Lab Results   Component Value Date    HGBA1C 12.3 (A) 04/19/2023     - advised to follow up podiatrist, Dr. Mendelzoon, seen within a year  - advised to follow up yearly ophthalmologist  - UACR elevated on visit 8/2022  - CGM re-ordered  - ordered pharmacist referral  - Ozempic tolerated, continue at low dose 0.25 mg every week, will increase to 0.5 mg weekly  at next visit

## 2023-05-23 ENCOUNTER — APPOINTMENT (OUTPATIENT)
Dept: PRIMARY CARE | Facility: CLINIC | Age: 73
End: 2023-05-23
Payer: MEDICARE

## 2023-05-26 DIAGNOSIS — E11.9 TYPE 2 DIABETES MELLITUS WITHOUT COMPLICATION, WITHOUT LONG-TERM CURRENT USE OF INSULIN (MULTI): ICD-10-CM

## 2023-05-30 RX ORDER — FLASH GLUCOSE SENSOR
KIT MISCELLANEOUS
Qty: 1 EACH | Refills: 0 | Status: SHIPPED | OUTPATIENT
Start: 2023-05-30 | End: 2023-06-16

## 2023-06-09 DIAGNOSIS — E11.9 TYPE 2 DIABETES MELLITUS WITHOUT COMPLICATION, WITHOUT LONG-TERM CURRENT USE OF INSULIN (MULTI): ICD-10-CM

## 2023-06-16 RX ORDER — FLASH GLUCOSE SENSOR
KIT MISCELLANEOUS
Qty: 1 EACH | Refills: 0 | Status: SHIPPED | OUTPATIENT
Start: 2023-06-16 | End: 2023-06-19 | Stop reason: SDUPTHER

## 2023-06-19 ENCOUNTER — OFFICE VISIT (OUTPATIENT)
Dept: PRIMARY CARE | Facility: CLINIC | Age: 73
End: 2023-06-19
Payer: MEDICARE

## 2023-06-19 VITALS
WEIGHT: 222 LBS | OXYGEN SATURATION: 96 % | HEART RATE: 80 BPM | BODY MASS INDEX: 36.94 KG/M2 | SYSTOLIC BLOOD PRESSURE: 126 MMHG | RESPIRATION RATE: 18 BRPM | DIASTOLIC BLOOD PRESSURE: 80 MMHG

## 2023-06-19 DIAGNOSIS — K76.0 FATTY LIVER DISEASE, NONALCOHOLIC: ICD-10-CM

## 2023-06-19 DIAGNOSIS — M32.9 LUPUS (MULTI): ICD-10-CM

## 2023-06-19 DIAGNOSIS — E78.5 HYPERLIPIDEMIA, UNSPECIFIED HYPERLIPIDEMIA TYPE: ICD-10-CM

## 2023-06-19 DIAGNOSIS — M17.9 OSTEOARTHRITIS OF KNEE, UNSPECIFIED LATERALITY, UNSPECIFIED OSTEOARTHRITIS TYPE: ICD-10-CM

## 2023-06-19 DIAGNOSIS — J98.4 RESTRICTIVE LUNG DISEASE: Primary | ICD-10-CM

## 2023-06-19 DIAGNOSIS — I10 BENIGN ESSENTIAL HTN: ICD-10-CM

## 2023-06-19 DIAGNOSIS — Z00.00 HEALTH CARE MAINTENANCE: ICD-10-CM

## 2023-06-19 DIAGNOSIS — M10.9 GOUT, UNSPECIFIED CAUSE, UNSPECIFIED CHRONICITY, UNSPECIFIED SITE: ICD-10-CM

## 2023-06-19 DIAGNOSIS — E11.9 TYPE 2 DIABETES MELLITUS WITHOUT COMPLICATION, WITHOUT LONG-TERM CURRENT USE OF INSULIN (MULTI): ICD-10-CM

## 2023-06-19 DIAGNOSIS — E03.9 HYPOTHYROIDISM, ADULT: ICD-10-CM

## 2023-06-19 PROCEDURE — 3079F DIAST BP 80-89 MM HG: CPT | Performed by: INTERNAL MEDICINE

## 2023-06-19 PROCEDURE — 3074F SYST BP LT 130 MM HG: CPT | Performed by: INTERNAL MEDICINE

## 2023-06-19 PROCEDURE — 1159F MED LIST DOCD IN RCRD: CPT | Performed by: INTERNAL MEDICINE

## 2023-06-19 PROCEDURE — 99214 OFFICE O/P EST MOD 30 MIN: CPT | Performed by: INTERNAL MEDICINE

## 2023-06-19 PROCEDURE — 4010F ACE/ARB THERAPY RXD/TAKEN: CPT | Performed by: INTERNAL MEDICINE

## 2023-06-19 PROCEDURE — 3046F HEMOGLOBIN A1C LEVEL >9.0%: CPT | Performed by: INTERNAL MEDICINE

## 2023-06-19 PROCEDURE — 1036F TOBACCO NON-USER: CPT | Performed by: INTERNAL MEDICINE

## 2023-06-19 PROCEDURE — 1160F RVW MEDS BY RX/DR IN RCRD: CPT | Performed by: INTERNAL MEDICINE

## 2023-06-19 PROCEDURE — 99397 PER PM REEVAL EST PAT 65+ YR: CPT | Performed by: INTERNAL MEDICINE

## 2023-06-19 PROCEDURE — 1157F ADVNC CARE PLAN IN RCRD: CPT | Performed by: INTERNAL MEDICINE

## 2023-06-19 RX ORDER — METOPROLOL TARTRATE 100 MG/1
50 TABLET ORAL 2 TIMES DAILY
Qty: 180 TABLET | Refills: 3 | Status: SHIPPED | OUTPATIENT
Start: 2023-06-19

## 2023-06-19 RX ORDER — MONTELUKAST SODIUM 10 MG/1
10 TABLET ORAL NIGHTLY
COMMUNITY
End: 2023-06-19 | Stop reason: SDUPTHER

## 2023-06-19 RX ORDER — LOSARTAN POTASSIUM 50 MG/1
50 TABLET ORAL 2 TIMES DAILY
Qty: 180 TABLET | Refills: 3 | Status: SHIPPED | OUTPATIENT
Start: 2023-06-19 | End: 2024-01-17 | Stop reason: SDUPTHER

## 2023-06-19 RX ORDER — LEVOTHYROXINE SODIUM 100 UG/1
100 TABLET ORAL DAILY
Qty: 90 TABLET | Refills: 3 | Status: SHIPPED | OUTPATIENT
Start: 2023-06-19 | End: 2024-01-17 | Stop reason: SDUPTHER

## 2023-06-19 RX ORDER — ALBUTEROL SULFATE 90 UG/1
2 AEROSOL, METERED RESPIRATORY (INHALATION) EVERY 6 HOURS PRN
Qty: 18 G | Refills: 3 | Status: SHIPPED | OUTPATIENT
Start: 2023-06-19

## 2023-06-19 RX ORDER — ALBUTEROL SULFATE 90 UG/1
2 AEROSOL, METERED RESPIRATORY (INHALATION) EVERY 6 HOURS PRN
COMMUNITY
End: 2023-06-19 | Stop reason: SDUPTHER

## 2023-06-19 RX ORDER — FLASH GLUCOSE SENSOR
KIT MISCELLANEOUS
Qty: 6 EACH | Refills: 3 | Status: SHIPPED | OUTPATIENT
Start: 2023-06-19 | End: 2024-05-13 | Stop reason: SDUPTHER

## 2023-06-19 RX ORDER — METFORMIN HYDROCHLORIDE 1000 MG/1
1000 TABLET ORAL
Qty: 180 TABLET | Refills: 3 | Status: SHIPPED | OUTPATIENT
Start: 2023-06-19 | End: 2024-06-18

## 2023-06-19 RX ORDER — DAPAGLIFLOZIN 5 MG/1
5 TABLET, FILM COATED ORAL
Qty: 90 TABLET | Refills: 3 | Status: SHIPPED | OUTPATIENT
Start: 2023-06-19 | End: 2024-01-17 | Stop reason: SDUPTHER

## 2023-06-19 RX ORDER — MONTELUKAST SODIUM 10 MG/1
10 TABLET ORAL NIGHTLY PRN
Qty: 90 TABLET | Refills: 2 | Status: SHIPPED | OUTPATIENT
Start: 2023-06-19 | End: 2023-11-22 | Stop reason: ALTCHOICE

## 2023-06-19 RX ORDER — ALLOPURINOL 100 MG/1
300 TABLET ORAL DAILY
Qty: 270 TABLET | Refills: 3 | Status: SHIPPED | OUTPATIENT
Start: 2023-06-19 | End: 2024-06-18

## 2023-06-19 RX ORDER — ATORVASTATIN CALCIUM 20 MG/1
20 TABLET, FILM COATED ORAL DAILY
Qty: 90 TABLET | Refills: 3 | Status: SHIPPED | OUTPATIENT
Start: 2023-06-19 | End: 2024-06-18

## 2023-06-19 NOTE — ASSESSMENT & PLAN NOTE
"MWV 10/13/22, physical 6/19/23   Aspirin for primary/secondary prophylaxis: out of age range   Diabetes Screening/monitoring: as above  Lipid screening:   Lab Results   Component Value Date    CHOL 154 08/08/2022    CHOL 164 09/21/2021     Lab Results   Component Value Date    HDL 39.5 (A) 08/08/2022    HDL 47.7 09/21/2021     No results found for: \"LDLCALC\"  Lab Results   Component Value Date    TRIG 222 (H) 08/08/2022    TRIG 190 (H) 09/21/2021     No components found for: \"CHOLHDL\"    STD/HIV Screening: declined  Last Mammogram: follow up with Dr. Freedom Winters M.D. as per patient had it this year 2023  Last Pap smear: out of age range, follow up Dr.. Freedom Winters M.D.  Lung Cancer Screening: never smoke  Hepatitis C Screening: negative 9/21/21  DEXA Scan : normal 11/2021, repeat 11/2023  Colonoscopy: last known colonoscopy was 10/2014 , due repeat in 2024  Vaccination : s/p 2x COVID vaccine, s/p PCV 23 and PCV 13, , ordered shingrix on prior visit , out of season for flu   "

## 2023-06-19 NOTE — ASSESSMENT & PLAN NOTE
- c/w allopurinol to 300 mg qd  Lab Results   Component Value Date    URICACID 4.9 04/19/2023

## 2023-06-19 NOTE — PROGRESS NOTES
Subjective   Patient ID:   Yolanda Zabala is a 73 y.o. female, history of DM II, hypothyroidism, gout, HLD, SLE, asthma, who presents for Follow-up    Noted patient is difficult historian, however does have a new primary care, Yesica Stoner, [address] [facility], [id number], have appointment on 9/25/23     DM II  - on metformin 1000 mg bid  - on farxiga 5 mg qd, tolerated well , pioglitazone was d/c'ed on prior visit   - followed up by podiatrist (Mendelzoon)  - elevated A1C  Lab Results   Component Value Date    HGBA1C 12.3 (A) 04/19/2023        - patient brought her glucose strips and glucometer      - before eating glucose is around 170 to 180s, patient's glucose after eating up to 300s, but mostly 250s , finally able to use glucometer with the right strip       - on patient's last visit on 5/1/23, patient was started on Ozempic, started on 0.25 mg qweekly     -  patient have average glucose 162 in the past 90 days      - patient also have been seeing nutritionists      - have received, CGM 80% glucose have been on target    HTN  - on losartan 50 mg bid  - on metoprolol 50 mg BID    Osteoarthritis s/p surgery  - surgery was 4/7/21, performed by Dr. Keanu Agosto  - last seen 7/23/21, have follow up in October 19th 2021  - s/p physical therapy, currently off   - felt better after surgery   - on meloxicam 7.5 mg PRN    HLD  - on atorvastatin 20 mg qd      Hypothyroidism  - on levothyroxine 100 mcg every day   Lab Results   Component Value Date    TSH 1.83 04/19/2023        HTN  - on losartan 50 mg bid  - on metoprolol 100 mg BID     Restrictive pulmonary disease  - have been off  montelukast and albuterol PRN  - was seen by Dr. Espinoza in the past , last seen ~2008  - last PFT on 9/10/21 noted restrictive disease concerning for ILD  - currently follows with Dr. oGnzales, last seen 2/2023, follow up in 6 months  - plan to repeat CT in 1/2024     Gout  - uric acid was elevated up to 7 on 8/2022,  allopurinol have increased since to 300 mg qd     Lupus   - last seen was 11/2020 by Dr. Mccabe  - follow up yearly   - on hydroxychloroquine 200 mg qd   - was told to see nephrologist, have appointment with Dr. Navarro on 7/12/2023    Referral to hepatologist  - patient have appointment with Dr. Froylan Aponte, in 8/2023 (phone #[phone])  - patient with mid elevation of AST/ALT, last was in the 50s  - ordered by Dr. Mccabe     All other (10) organ systems have been reviewed, negative for significant complaint and no change from baseline other than mentioned as per HPI above.    Patient Active Problem List   Diagnosis    Muscle spasm    Benign essential HTN    Restrictive lung disease    Allergic conjunctivitis    Gout    Hyperlipidemia    Hypothyroidism, adult    Type 2 diabetes mellitus without complication, without long-term current use of insulin (CMS/HCC)    Knee osteoarthritis    Fatty liver disease, nonalcoholic    Lupus (CMS/HCC)    Health care maintenance    Unsteady        Past Surgical History:   Procedure Laterality Date    OTHER SURGICAL HISTORY  07/27/2021    Knee replacement       No family history on file.    Social History    reports that she has never smoked. She has never used smokeless tobacco. She reports that she does not currently use alcohol. She reports that she does not currently use drugs.  Allergies   Allergen Reactions    Bacitracin Unknown    Penicillins Unknown     can't remember    Phenytoin Unknown    Sulfamethoxazole-Trimethoprim Unknown       Medication Documentation Review Audit       Reviewed by Zoë Fisher MD (Physician) on 06/19/23 at 1103      Medication Order Taking? Sig Documenting Provider Last Dose Status   allopurinol (Zyloprim) 100 mg tablet [id number] Yes Take 3 tablets (300 mg) by mouth once daily. Historical Provider, MD Taking Active   atorvastatin (Lipitor) 20 mg tablet [id number] Yes Take 1 tablet (20 mg) by mouth once daily. Historical Provider, MD Taking  Active   b complex vitamins capsule [id number] Yes Take 1 capsule by mouth once daily. Zoë Fisher MD Taking Active   blood sugar diagnostic strip [id number] Yes Please prescribe strips compatible with Relion Premier Classic Zoë Fisher MD Taking Active   clotrimazole (Lotrimin) 1 % cream [id number] Yes APPLY CREAM TOPICALLY AND SPARINGLY TO THE AFFECTED AREA TWICE DAILY Katy Gonzalez MD Taking Active   Farxiga 5 mg [id number] Yes Take 1 tablet (5 mg) by mouth once daily in the morning. Take before meals. Katy Gonzalez MD Taking Active   FreeStyle Mikhail 2 Sensor kit [id number] Yes USE AS DIRECTED Zoë Fisher MD Taking Active   FreeStyle Mikhail reader (FreeStyle Mikhail 2 [place]) misc [id number] Yes Use as instructed Zoë Fisher MD Taking Active     Discontinued 06/16/23 0900   hydroxychloroquine (Plaquenil) 200 mg tablet [id number] Yes Take 1 tablet (200 mg) by mouth once daily. Katy Gonzalez MD Taking Active   latanoprost (Xalatan) 0.005 % ophthalmic solution [id number] Yes INSTILL 1 DROP INTO EACH EYE ONCE DAILY AT NIGHT Katy Gonzalez MD Taking Active   levothyroxine (Synthroid, Levoxyl) 100 mcg tablet [id number] Yes Take 1 tablet (100 mcg) by mouth once daily. Katy Gonzalez MD Taking Active   losartan (Cozaar) 50 mg tablet [id number] Yes Take 1 tablet (50 mg) by mouth 2 times a day. Katy Gonzalez MD Taking Active   meloxicam (Mobic) 7.5 mg tablet [id number] Yes Take 1 tablet (7.5 mg) by mouth once daily as needed. Katy Gonzalez MD Taking Active   metFORMIN (Glucophage) 1,000 mg tablet [id number] Yes Take 1 tablet (1,000 mg) by mouth 2 times a day with meals. Take with morning and evening meal. Katy Gonzalez MD Taking Active   metoprolol tartrate (Lopressor) 100 mg tablet [id number] Yes Take 0.5 tablets (50 mg) by mouth in the morning and 0.5 tablets (50 mg) before bedtime. Zoë Fisher MD Taking Active   semaglutide 0.25 mg or 0.5 mg (2 mg/3 mL) pen  "injector [id number] Yes Inject 0.25 mg under the skin 1 (one) time per week. Zoë Fisher MD Taking Active                     Objective       10/13/2022     9:13 AM 2/7/2023    10:02 AM 2/27/2023    10:23 AM 4/18/2023     9:43 AM 5/1/2023     1:51 PM 5/18/2023    12:04 PM 6/19/2023    11:00 AM   Vitals   Systolic 136 135 130 132 122 126 126   Diastolic 82 83 78 84 72 80 80   Heart Rate 71 62  79  77 80   Temp 36.3 °C (97.3 °F)   35.8 °C (96.4 °F) 33.8 °C (92.8 °F)     Resp 16 16  18 18 18 18   Height (in) 1.651 m (5' 5\") 1.651 m (5' 5\") 1.651 m (5' 5\") 1.651 m (5' 5\") 1.651 m (5' 5\")     Weight (lb) 244 242.38 238 228 225 224 222   BMI 40.6 kg/m2 40.33 kg/m2 39.61 kg/m2 37.94 kg/m2 37.44 kg/m2 37.28 kg/m2 36.94 kg/m2   BSA (m2) 2.26 m2 2.25 m2 2.23 m2 2.17 m2 2.16 m2 2.16 m2 2.15 m2   Visit Report    Report Report Report Report     Physical Exam  Constitutional:       General: She is not in acute distress.     Appearance: Normal appearance. She is not ill-appearing or toxic-appearing.   HENT:      Head: Normocephalic.   Eyes:      Extraocular Movements: Extraocular movements intact.      Conjunctiva/sclera: Conjunctivae normal.   Cardiovascular:      Rate and Rhythm: Normal rate and regular rhythm.      Heart sounds: Normal heart sounds. No murmur heard.     No friction rub. No gallop.   Pulmonary:      Effort: Pulmonary effort is normal. No respiratory distress.      Breath sounds: Normal breath sounds. No stridor. No wheezing, rhonchi or rales.   Abdominal:      General: Abdomen is flat. There is no distension.      Palpations: Abdomen is soft.      Tenderness: There is no abdominal tenderness. There is no guarding.   Musculoskeletal:         General: Normal range of motion.   Skin:     General: Skin is warm and dry.   Neurological:      General: No focal deficit present.      Mental Status: She is alert. Mental status is at baseline.       No visits with results within 1 Month(s) from this visit.   Latest " known visit with results is:   Lab on 04/19/2023   Component Date Value Ref Range Status    Glucose 04/19/2023 406 (H)  74 - 99 mg/dL Final    Sodium 04/19/2023 137  136 - 145 mmol/L Final    Potassium 04/19/2023 5.0  3.5 - 5.3 mmol/L Final    Chloride 04/19/2023 98  98 - 107 mmol/L Final    Bicarbonate 04/19/2023 26  21 - 32 mmol/L Final    Anion Gap 04/19/2023 18  10 - 20 mmol/L Final    Urea Nitrogen 04/19/2023 22  6 - 23 mg/dL Final    Creatinine 04/19/2023 1.01  0.50 - 1.05 mg/dL Final    GFR Female 04/19/2023 59 (A)  >90 mL/min/1.73m2 Final     CALCULATIONS OF ESTIMATED GFR ARE PERFORMED   USING THE 2021 CKD-EPI STUDY REFIT EQUATION   WITHOUT THE RACE VARIABLE FOR THE IDMS-TRACEABLE   CREATININE METHODS.    https://jasn.asnjournals.org/content/early/2021/09/22/ASN.8935817148    Calcium 04/19/2023 9.6  8.6 - 10.3 mg/dL Final    Albumin 04/19/2023 3.9  3.4 - 5.0 g/dL Final    Alkaline Phosphatase 04/19/2023 125  33 - 136 U/L Final    Total Protein 04/19/2023 7.5  6.4 - 8.2 g/dL Final    AST 04/19/2023 61 (H)  9 - 39 U/L Final    Total Bilirubin 04/19/2023 0.7  0.0 - 1.2 mg/dL Final    ALT (SGPT) 04/19/2023 47 (H)  7 - 45 U/L Final     Patients treated with Sulfasalazine may generate    falsely decreased results for ALT.    Hemoglobin A1C 04/19/2023 12.3 (A)  % Final         Diagnosis of Diabetes-Adults   Non-Diabetic: < or = 5.6%   Increased risk for developing diabetes: 5.7-6.4%   Diagnostic of diabetes: > or = 6.5%  .       Monitoring of Diabetes                Age (y)     Therapeutic Goal (%)   Adults:          >18           <7.0   Pediatrics:    13-18           <7.5                   7-12           <8.0                   0- 6            7.5-8.5   American Diabetes Association. Diabetes Care 33(S1), Jan 2010.    Estimated Average Glucose 04/19/2023 306  MG/DL Final    WBC 04/19/2023 6.5  4.4 - 11.3 x10E9/L Final    RBC 04/19/2023 4.68  4.00 - 5.20 x10E12/L Final    Hemoglobin 04/19/2023 14.0  12.0 - 16.0 g/dL  Final    Hematocrit 04/19/2023 45.4  36.0 - 46.0 % Final    MCV 04/19/2023 97  80 - 100 fL Final    MCHC 04/19/2023 30.8 (L)  32.0 - 36.0 g/dL Final    Platelets 04/19/2023 260  150 - 450 x10E9/L Final    RDW 04/19/2023 13.3  11.5 - 14.5 % Final    Neutrophils % 04/19/2023 55.2  40.0 - 80.0 % Final    Immature Granulocytes %, Automated 04/19/2023 0.3  0.0 - 0.9 % Final     Immature Granulocyte Count (IG) includes promyelocytes,    myelocytes and metamyelocytes but does not include bands.   Percent differential counts (%) should be interpreted in the   context of the absolute cell counts (cells/L).    Lymphocytes % 04/19/2023 29.2  13.0 - 44.0 % Final    Monocytes % 04/19/2023 8.9  2.0 - 10.0 % Final    Eosinophils % 04/19/2023 5.2  0.0 - 6.0 % Final    Basophils % 04/19/2023 1.2  0.0 - 2.0 % Final    Neutrophils Absolute 04/19/2023 3.60  1.60 - 5.50 x10E9/L Final    Lymphocytes Absolute 04/19/2023 1.91  0.80 - 3.00 x10E9/L Final    Monocytes Absolute 04/19/2023 0.58  0.05 - 0.80 x10E9/L Final    Eosinophils Absolute 04/19/2023 0.34  0.00 - 0.40 x10E9/L Final    Basophils Absolute 04/19/2023 0.08  0.00 - 0.10 x10E9/L Final    TSH 04/19/2023 1.83  0.44 - 3.98 mIU/L Final     TSH testing is performed using different testing    methodology at [facility] than at other    [facility]. Direct result comparisons should    only be made within the same method.    Uric Acid 04/19/2023 4.9  2.3 - 6.7 mg/dL Final     Venipuncture immediately after or during the    administration of Metamizole may lead to falsely   low results. Testing should be performed immediately   prior to Metamizole dosing.         Assessment/Plan     Yolandatavo Zabala is a 73 y.o. female, history of DM II, hypothyroidism, gout, HLD, SLE, asthma, who presents for Follow-up    Problem List Items Addressed This Visit       Benign essential HTN     - c/w losartan 50 mg bid  - c/w metoprolol  50 mg BID  - controlled on this visit           "Relevant Medications    metoprolol tartrate (Lopressor) 100 mg tablet    losartan (Cozaar) 50 mg tablet    Restrictive lung disease - Primary     - c/w montelukast  - c/w albuterol PRN  - PFT 9/2021 noted restrictive disease  - had CT scan done on 7/12/22, unchanged stable nodule  - advised to follow up pulm , last seen 2/2023, follow up in 8/2023, repeat CT due 1/2024   - also had sleep study which was done one 1/2022, negative for sleep apnea          Relevant Medications    albuterol 90 mcg/actuation inhaler    montelukast (Singulair) 10 mg tablet    Gout     - c/w allopurinol to 300 mg qd  Lab Results   Component Value Date    URICACID 4.9 04/19/2023              Relevant Medications    allopurinol (Zyloprim) 100 mg tablet    Hyperlipidemia     - c/w atorvastatin 20 mg qd   Lab Results   Component Value Date    CHOL 154 08/08/2022    CHOL 164 09/21/2021     Lab Results   Component Value Date    HDL 39.5 (A) 08/08/2022    HDL 47.7 09/21/2021     No results found for: \"LDLCALC\"  Lab Results   Component Value Date    TRIG 222 (H) 08/08/2022    TRIG 190 (H) 09/21/2021     No components found for: \"CHOLHDL\"           Relevant Medications    atorvastatin (Lipitor) 20 mg tablet    Hypothyroidism, adult     - c/w levothyroxine 100 mcg every day  Lab Results   Component Value Date    TSH 1.83 04/19/2023              Relevant Medications    levothyroxine (Synthroid, Levoxyl) 100 mcg tablet    Type 2 diabetes mellitus without complication, without long-term current use of insulin (CMS/[facility])     - c/w metformin 1000 mg bid  - c/w Farxiga 5 mg qd   -   Lab Results   Component Value Date    HGBA1C 12.3 (A) 04/19/2023     - advised to follow up podiatrist, Dr. Mendelzoon, seen within a year  - advised to follow up yearly ophthalmologist  - UACR elevated on visit 8/2022  - on CGM , ordered more sensor  - Ozempic tolerated, will increase to 0.5 mg weekly, ordered as noted below          Relevant Medications    FreeStyle Mikhail " "sensor system (FreeStyle Mikhail 2 Sensor) kit    semaglutide 0.25 mg or 0.5 mg (2 mg/3 mL) pen injector    dapagliflozin propanediol (Farxiga) 5 mg    metFORMIN (Glucophage) 1,000 mg tablet    Knee osteoarthritis     - had knee surgery   - followed by Dr. Keanu Agosto   - s/p physical therapy, currently off medication         Fatty liver disease, nonalcoholic     - last U/S on 3/2023, noted fatty infiltration of the liver with hepatomegaly  - baseline AST/ALT around 50s  - have hepatologist referral, pending appointment with Dr. Aponte on 8/2023         Lupus (CMS/HCC)     - f/u Dr. Mccabe , last seen 2/2023  - on hydroxychloroquine 200 mg qd   - hx of lupus nephritis - have appointment with Dr. Navarro 7/2023  - currently stable  - unclear if the pulmonary issue is related         Health care maintenance     MWV 10/13/22, physical 6/19/23   Aspirin for primary/secondary prophylaxis: out of age range   Diabetes Screening/monitoring: as above  Lipid screening:   Lab Results   Component Value Date    CHOL 154 08/08/2022    CHOL 164 09/21/2021     Lab Results   Component Value Date    HDL 39.5 (A) 08/08/2022    HDL 47.7 09/21/2021     No results found for: \"LDLCALC\"  Lab Results   Component Value Date    TRIG 222 (H) 08/08/2022    TRIG 190 (H) 09/21/2021     No components found for: \"CHOLHDL\"    STD/HIV Screening: declined  Last Mammogram: follow up with Dr. Freedom Winters M.D. as per patient had it this year 2023  Last Pap smear: out of age range, follow up Dr.. Freedom Winters M.D.  Lung Cancer Screening: never smoke  Hepatitis C Screening: negative 9/21/21  DEXA Scan : normal 11/2021, repeat 11/2023  Colonoscopy: last known colonoscopy was 10/2014 , due repeat in 2024  Vaccination : s/p 2x COVID vaccine, s/p PCV 23 and PCV 13, , ordered shingrix on prior visit , out of season for flu           Follow up with new provider as noted above 9/2023  No future appointments.      "

## 2023-06-19 NOTE — ASSESSMENT & PLAN NOTE
- had knee surgery   - followed by Dr. Keanu Agosto   - s/p physical therapy, currently off medication

## 2023-06-19 NOTE — ASSESSMENT & PLAN NOTE
- f/u Dr. Mccabe , last seen 2/2023  - on hydroxychloroquine 200 mg qd   - hx of lupus nephritis - have appointment with Dr. Navarro 7/2023  - currently stable  - unclear if the pulmonary issue is related

## 2023-06-19 NOTE — ASSESSMENT & PLAN NOTE
"- c/w atorvastatin 20 mg qd   Lab Results   Component Value Date    CHOL 154 08/08/2022    CHOL 164 09/21/2021     Lab Results   Component Value Date    HDL 39.5 (A) 08/08/2022    HDL 47.7 09/21/2021     No results found for: \"LDLCALC\"  Lab Results   Component Value Date    TRIG 222 (H) 08/08/2022    TRIG 190 (H) 09/21/2021     No components found for: \"CHOLHDL\"    "

## 2023-06-19 NOTE — ASSESSMENT & PLAN NOTE
- c/w metformin 1000 mg bid  - c/w Farxiga 5 mg qd   -   Lab Results   Component Value Date    HGBA1C 12.3 (A) 04/19/2023     - advised to follow up podiatrist, Dr. Mendelzoon, seen within a year  - advised to follow up yearly ophthalmologist  - UACR elevated on visit 8/2022  - on CGM , ordered more sensor  - Ozempic tolerated, will increase to 0.5 mg weekly, ordered as noted below

## 2023-06-19 NOTE — ASSESSMENT & PLAN NOTE
- c/w levothyroxine 100 mcg every day  Lab Results   Component Value Date    TSH 1.83 04/19/2023

## 2023-07-19 DIAGNOSIS — Z59.41 FOOD INSECURITY: Primary | ICD-10-CM

## 2023-07-19 SDOH — ECONOMIC STABILITY - FOOD INSECURITY: FOOD INSECURITY: Z59.41

## 2023-07-19 SDOH — ECONOMIC STABILITY: FOOD INSECURITY: WITHIN THE PAST 12 MONTHS, YOU WORRIED THAT YOUR FOOD WOULD RUN OUT BEFORE YOU GOT MONEY TO BUY MORE.: PATIENT DECLINED

## 2023-07-19 SDOH — ECONOMIC STABILITY: FOOD INSECURITY: WITHIN THE PAST 12 MONTHS, THE FOOD YOU BOUGHT JUST DIDN'T LAST AND YOU DIDN'T HAVE MONEY TO GET MORE.: PATIENT DECLINED

## 2023-08-11 LAB
ALANINE AMINOTRANSFERASE (SGPT) (U/L) IN SER/PLAS: 61 U/L (ref 7–45)
ALBUMIN (G/DL) IN SER/PLAS: 4.2 G/DL (ref 3.4–5)
ALKALINE PHOSPHATASE (U/L) IN SER/PLAS: 138 U/L (ref 33–136)
ANION GAP IN SER/PLAS: 17 MMOL/L (ref 10–20)
ASPARTATE AMINOTRANSFERASE (SGOT) (U/L) IN SER/PLAS: 54 U/L (ref 9–39)
BASOPHILS (10*3/UL) IN BLOOD BY AUTOMATED COUNT: 0.1 X10E9/L (ref 0–0.1)
BASOPHILS/100 LEUKOCYTES IN BLOOD BY AUTOMATED COUNT: 0.9 % (ref 0–2)
BILIRUBIN DIRECT (MG/DL) IN SER/PLAS: 0.1 MG/DL (ref 0–0.3)
BILIRUBIN TOTAL (MG/DL) IN SER/PLAS: 0.6 MG/DL (ref 0–1.2)
CALCIUM (MG/DL) IN SER/PLAS: 9.8 MG/DL (ref 8.6–10.3)
CARBON DIOXIDE, TOTAL (MMOL/L) IN SER/PLAS: 25 MMOL/L (ref 21–32)
CHLORIDE (MMOL/L) IN SER/PLAS: 101 MMOL/L (ref 98–107)
CREATININE (MG/DL) IN SER/PLAS: 0.97 MG/DL (ref 0.5–1.05)
EOSINOPHILS (10*3/UL) IN BLOOD BY AUTOMATED COUNT: 0.48 X10E9/L (ref 0–0.4)
EOSINOPHILS/100 LEUKOCYTES IN BLOOD BY AUTOMATED COUNT: 4.5 % (ref 0–6)
ERYTHROCYTE DISTRIBUTION WIDTH (RATIO) BY AUTOMATED COUNT: 14.5 % (ref 11.5–14.5)
ERYTHROCYTE MEAN CORPUSCULAR HEMOGLOBIN CONCENTRATION (G/DL) BY AUTOMATED: 32.1 G/DL (ref 32–36)
ERYTHROCYTE MEAN CORPUSCULAR VOLUME (FL) BY AUTOMATED COUNT: 93 FL (ref 80–100)
ERYTHROCYTES (10*6/UL) IN BLOOD BY AUTOMATED COUNT: 4.61 X10E12/L (ref 4–5.2)
FERRITIN (UG/LL) IN SER/PLAS: 333 UG/L (ref 8–150)
GFR FEMALE: 62 ML/MIN/1.73M2
GLUCOSE (MG/DL) IN SER/PLAS: 140 MG/DL (ref 74–99)
HEMATOCRIT (%) IN BLOOD BY AUTOMATED COUNT: 42.7 % (ref 36–46)
HEMOGLOBIN (G/DL) IN BLOOD: 13.7 G/DL (ref 12–16)
IMMATURE GRANULOCYTES/100 LEUKOCYTES IN BLOOD BY AUTOMATED COUNT: 0.6 % (ref 0–0.9)
INR IN PPP BY COAGULATION ASSAY: 1.1 (ref 0.9–1.1)
IRON (UG/DL) IN SER/PLAS: 56 UG/DL (ref 35–150)
IRON BINDING CAPACITY (UG/DL) IN SER/PLAS: 420 UG/DL (ref 240–445)
IRON SATURATION (%) IN SER/PLAS: 13 % (ref 25–45)
LEUKOCYTES (10*3/UL) IN BLOOD BY AUTOMATED COUNT: 10.7 X10E9/L (ref 4.4–11.3)
LYMPHOCYTES (10*3/UL) IN BLOOD BY AUTOMATED COUNT: 2.24 X10E9/L (ref 0.8–3)
LYMPHOCYTES/100 LEUKOCYTES IN BLOOD BY AUTOMATED COUNT: 20.9 % (ref 13–44)
MONOCYTES (10*3/UL) IN BLOOD BY AUTOMATED COUNT: 0.81 X10E9/L (ref 0.05–0.8)
MONOCYTES/100 LEUKOCYTES IN BLOOD BY AUTOMATED COUNT: 7.6 % (ref 2–10)
NEUTROPHILS (10*3/UL) IN BLOOD BY AUTOMATED COUNT: 7.03 X10E9/L (ref 1.6–5.5)
NEUTROPHILS/100 LEUKOCYTES IN BLOOD BY AUTOMATED COUNT: 65.5 % (ref 40–80)
PLATELETS (10*3/UL) IN BLOOD AUTOMATED COUNT: 310 X10E9/L (ref 150–450)
POTASSIUM (MMOL/L) IN SER/PLAS: 4.3 MMOL/L (ref 3.5–5.3)
PROTEIN TOTAL: 8.6 G/DL (ref 6.4–8.2)
PROTHROMBIN TIME (PT) IN PPP BY COAGULATION ASSAY: 12.1 SEC (ref 9.8–12.8)
SODIUM (MMOL/L) IN SER/PLAS: 139 MMOL/L (ref 136–145)
UREA NITROGEN (MG/DL) IN SER/PLAS: 27 MG/DL (ref 6–23)

## 2023-08-12 LAB
HEPATITIS A TOTAL AB INTERPRETATION: NONREACTIVE
HEPATITIS B VIRUS CORE AB (PRESENCE) IN SER/PLAS BY IMM: NONREACTIVE
HEPATITIS B VIRUS SURFACE AB (MIU/ML) IN SERUM: 6.6 MIU/ML

## 2023-08-16 LAB
ALPHA-1 ANTITRYPSIN PHENOTYPE: NORMAL
ALPHA-1-ANTITRYPSIN (REF): 116 MG/DL (ref 90–200)

## 2023-11-08 PROBLEM — J45.909 ASTHMA (HHS-HCC): Status: ACTIVE | Noted: 2023-11-08

## 2023-11-08 PROBLEM — A37.90 PERTUSSIS: Status: ACTIVE | Noted: 2023-11-08

## 2023-11-08 PROBLEM — J01.90 ACUTE BACTERIAL SINUSITIS: Status: ACTIVE | Noted: 2023-11-08

## 2023-11-08 PROBLEM — I45.10 RIGHT BUNDLE BRANCH BLOCK (RBBB) ON ELECTROCARDIOGRAPHY: Status: ACTIVE | Noted: 2023-11-08

## 2023-11-08 PROBLEM — R06.00 DYSPNEA: Status: ACTIVE | Noted: 2023-11-08

## 2023-11-08 PROBLEM — R42 LIGHTHEADEDNESS: Status: ACTIVE | Noted: 2023-11-08

## 2023-11-08 PROBLEM — E83.52 HYPERCALCEMIA: Status: ACTIVE | Noted: 2023-11-08

## 2023-11-08 PROBLEM — R74.8 ELEVATED LIVER ENZYMES: Status: ACTIVE | Noted: 2023-11-08

## 2023-11-08 PROBLEM — B96.89 ACUTE BACTERIAL SINUSITIS: Status: ACTIVE | Noted: 2023-11-08

## 2023-11-08 PROBLEM — R06.2 WHEEZING ON AUSCULTATION: Status: ACTIVE | Noted: 2023-11-08

## 2023-11-08 PROBLEM — R91.8 LUNG NODULES: Status: ACTIVE | Noted: 2023-11-08

## 2023-11-08 PROBLEM — J31.0 RHINITIS: Status: ACTIVE | Noted: 2023-11-08

## 2023-11-08 PROBLEM — N95.1 MENOPAUSAL SYMPTOM: Status: ACTIVE | Noted: 2023-11-08

## 2023-11-08 PROBLEM — R74.01 ELEVATED TRANSAMINASE LEVEL: Status: ACTIVE | Noted: 2023-11-08

## 2023-11-08 PROBLEM — N39.0 UTI (URINARY TRACT INFECTION): Status: ACTIVE | Noted: 2023-11-08

## 2023-11-08 PROBLEM — M79.89 LEG SWELLING: Status: ACTIVE | Noted: 2023-11-08

## 2023-11-08 PROBLEM — M54.32 LEFT SIDED SCIATICA: Status: ACTIVE | Noted: 2023-11-08

## 2023-11-08 PROBLEM — R10.9 ABDOMINAL PAIN: Status: ACTIVE | Noted: 2023-11-08

## 2023-11-08 PROBLEM — B37.2 YEAST INFECTION OF THE SKIN: Status: ACTIVE | Noted: 2023-11-08

## 2023-11-08 RX ORDER — DAPAGLIFLOZIN 10 MG/1
1 TABLET, FILM COATED ORAL
COMMUNITY
Start: 2022-08-17 | End: 2023-11-22 | Stop reason: DRUGHIGH

## 2023-11-21 PROBLEM — K76.0 STEATOSIS OF LIVER: Status: ACTIVE | Noted: 2023-11-21

## 2023-11-21 PROBLEM — G47.33 OSA ON CPAP: Status: ACTIVE | Noted: 2023-11-21

## 2023-11-22 ENCOUNTER — OFFICE VISIT (OUTPATIENT)
Dept: PRIMARY CARE | Facility: CLINIC | Age: 73
End: 2023-11-22
Payer: MEDICARE

## 2023-11-22 VITALS
DIASTOLIC BLOOD PRESSURE: 80 MMHG | SYSTOLIC BLOOD PRESSURE: 126 MMHG | HEIGHT: 65 IN | TEMPERATURE: 97.3 F | BODY MASS INDEX: 35.69 KG/M2 | HEART RATE: 66 BPM | WEIGHT: 214.2 LBS | OXYGEN SATURATION: 98 %

## 2023-11-22 DIAGNOSIS — G47.33 OSA ON CPAP: ICD-10-CM

## 2023-11-22 DIAGNOSIS — J01.90 ACUTE BACTERIAL SINUSITIS: ICD-10-CM

## 2023-11-22 DIAGNOSIS — R91.8 LUNG NODULES: ICD-10-CM

## 2023-11-22 DIAGNOSIS — B96.89 ACUTE BACTERIAL SINUSITIS: ICD-10-CM

## 2023-11-22 DIAGNOSIS — I10 BENIGN ESSENTIAL HTN: ICD-10-CM

## 2023-11-22 DIAGNOSIS — E66.01 OBESITY, MORBID (MULTI): Primary | ICD-10-CM

## 2023-11-22 DIAGNOSIS — J84.10 PULMONARY FIBROSIS, UNSPECIFIED (MULTI): ICD-10-CM

## 2023-11-22 DIAGNOSIS — E11.9 TYPE 2 DIABETES MELLITUS WITHOUT COMPLICATION, WITHOUT LONG-TERM CURRENT USE OF INSULIN (MULTI): ICD-10-CM

## 2023-11-22 DIAGNOSIS — J98.4 RESTRICTIVE LUNG DISEASE: ICD-10-CM

## 2023-11-22 PROBLEM — R42 LIGHTHEADEDNESS: Status: RESOLVED | Noted: 2023-11-08 | Resolved: 2023-11-22

## 2023-11-22 PROBLEM — J45.909 ASTHMA (HHS-HCC): Status: RESOLVED | Noted: 2023-11-08 | Resolved: 2023-11-22

## 2023-11-22 PROBLEM — R06.00 DYSPNEA: Status: RESOLVED | Noted: 2023-11-08 | Resolved: 2023-11-22

## 2023-11-22 PROBLEM — M54.32 LEFT SIDED SCIATICA: Status: RESOLVED | Noted: 2023-11-08 | Resolved: 2023-11-22

## 2023-11-22 PROBLEM — N95.1 MENOPAUSAL SYMPTOM: Status: RESOLVED | Noted: 2023-11-08 | Resolved: 2023-11-22

## 2023-11-22 PROBLEM — R74.8 ELEVATED LIVER ENZYMES: Status: RESOLVED | Noted: 2023-11-08 | Resolved: 2023-11-22

## 2023-11-22 PROBLEM — A37.90 PERTUSSIS: Status: RESOLVED | Noted: 2023-11-08 | Resolved: 2023-11-22

## 2023-11-22 PROBLEM — R06.2 WHEEZING ON AUSCULTATION: Status: RESOLVED | Noted: 2023-11-08 | Resolved: 2023-11-22

## 2023-11-22 PROBLEM — M10.9 GOUT: Status: RESOLVED | Noted: 2023-04-18 | Resolved: 2023-11-22

## 2023-11-22 PROBLEM — M62.838 MUSCLE SPASM: Status: RESOLVED | Noted: 2023-04-18 | Resolved: 2023-11-22

## 2023-11-22 PROBLEM — M17.9 KNEE OSTEOARTHRITIS: Status: RESOLVED | Noted: 2023-04-19 | Resolved: 2023-11-22

## 2023-11-22 PROBLEM — E83.52 HYPERCALCEMIA: Status: RESOLVED | Noted: 2023-11-08 | Resolved: 2023-11-22

## 2023-11-22 PROBLEM — R74.01 ELEVATED TRANSAMINASE LEVEL: Status: RESOLVED | Noted: 2023-11-08 | Resolved: 2023-11-22

## 2023-11-22 PROBLEM — M79.89 LEG SWELLING: Status: RESOLVED | Noted: 2023-11-08 | Resolved: 2023-11-22

## 2023-11-22 PROBLEM — K76.0 STEATOSIS OF LIVER: Status: RESOLVED | Noted: 2023-11-21 | Resolved: 2023-11-22

## 2023-11-22 PROBLEM — Z00.00 HEALTH CARE MAINTENANCE: Status: RESOLVED | Noted: 2023-04-19 | Resolved: 2023-11-22

## 2023-11-22 PROBLEM — N39.0 UTI (URINARY TRACT INFECTION): Status: RESOLVED | Noted: 2023-11-08 | Resolved: 2023-11-22

## 2023-11-22 PROBLEM — R10.9 ABDOMINAL PAIN: Status: RESOLVED | Noted: 2023-11-08 | Resolved: 2023-11-22

## 2023-11-22 PROBLEM — B37.2 YEAST INFECTION OF THE SKIN: Status: RESOLVED | Noted: 2023-11-08 | Resolved: 2023-11-22

## 2023-11-22 PROCEDURE — 3074F SYST BP LT 130 MM HG: CPT | Performed by: NURSE PRACTITIONER

## 2023-11-22 PROCEDURE — 1159F MED LIST DOCD IN RCRD: CPT | Performed by: NURSE PRACTITIONER

## 2023-11-22 PROCEDURE — 1126F AMNT PAIN NOTED NONE PRSNT: CPT | Performed by: NURSE PRACTITIONER

## 2023-11-22 PROCEDURE — 1036F TOBACCO NON-USER: CPT | Performed by: NURSE PRACTITIONER

## 2023-11-22 PROCEDURE — 1160F RVW MEDS BY RX/DR IN RCRD: CPT | Performed by: NURSE PRACTITIONER

## 2023-11-22 PROCEDURE — 3079F DIAST BP 80-89 MM HG: CPT | Performed by: NURSE PRACTITIONER

## 2023-11-22 PROCEDURE — 4010F ACE/ARB THERAPY RXD/TAKEN: CPT | Performed by: NURSE PRACTITIONER

## 2023-11-22 PROCEDURE — 3046F HEMOGLOBIN A1C LEVEL >9.0%: CPT | Performed by: NURSE PRACTITIONER

## 2023-11-22 PROCEDURE — 99214 OFFICE O/P EST MOD 30 MIN: CPT | Performed by: NURSE PRACTITIONER

## 2023-11-22 RX ORDER — ASCORBIC ACID 500 MG
1 TABLET ORAL DAILY
COMMUNITY
End: 2024-05-30 | Stop reason: ALTCHOICE

## 2023-11-22 RX ORDER — DOXYCYCLINE 100 MG/1
100 CAPSULE ORAL 2 TIMES DAILY
Qty: 14 CAPSULE | Refills: 0 | Status: SHIPPED | OUTPATIENT
Start: 2023-11-22 | End: 2023-11-29

## 2023-11-22 RX ORDER — PIOGLITAZONEHYDROCHLORIDE 30 MG/1
TABLET ORAL EVERY 24 HOURS
COMMUNITY
End: 2024-05-30 | Stop reason: ALTCHOICE

## 2023-11-22 ASSESSMENT — VISUAL ACUITY: OU: 1

## 2023-11-22 ASSESSMENT — ENCOUNTER SYMPTOMS
BRUISES/BLEEDS EASILY: 0
JOINT SWELLING: 0
ARTHRALGIAS: 0
ADENOPATHY: 0
WHEEZING: 0
OCCASIONAL FEELINGS OF UNSTEADINESS: 0
PALPITATIONS: 0
HEADACHES: 0
LOSS OF SENSATION IN FEET: 0
BACK PAIN: 0
DEPRESSION: 0
COUGH: 0
AGITATION: 0
WEAKNESS: 0
PHOTOPHOBIA: 0
CONSTIPATION: 0
DIARRHEA: 0
SHORTNESS OF BREATH: 0
TREMORS: 0
EYE DISCHARGE: 0
NERVOUS/ANXIOUS: 0
APPETITE CHANGE: 0
SINUS PAIN: 0
ABDOMINAL PAIN: 0
HEMATURIA: 0
ACTIVITY CHANGE: 0
SORE THROAT: 0
DIZZINESS: 0
BLOOD IN STOOL: 0
DYSURIA: 0

## 2023-11-22 ASSESSMENT — PATIENT HEALTH QUESTIONNAIRE - PHQ9
SUM OF ALL RESPONSES TO PHQ9 QUESTIONS 1 AND 2: 0
2. FEELING DOWN, DEPRESSED OR HOPELESS: NOT AT ALL
1. LITTLE INTEREST OR PLEASURE IN DOING THINGS: NOT AT ALL

## 2023-11-22 ASSESSMENT — PAIN SCALES - GENERAL: PAINLEVEL: 0-NO PAIN

## 2023-11-22 NOTE — PATIENT INSTRUCTIONS
Start doxycycline twice a day for 7days.  Take until finished even if you feel better before then  Focus on healthy eating including lean proteins and vegetables.  Avoid high carbohydrate high sugar foods and drinks.  Try to increase physical activity as tolerated.  Goal is for 160 minutes/week of physical activity.  Check blood sugars 1-2 times per day.  Call for fasting blood sugars that are remaining greater than 150 or random blood sugars that are remaining greater than 180.  Check blood pressure 2-3 times a week.  Call for blood pressures greater than 140/90.  Bring list of blood pressures to next visit.

## 2023-11-22 NOTE — PROGRESS NOTES
Subjective   Patient ID: Yolanda Zabala is a 73 y.o. female who presents for Follow-up (Pt present for sneezing and a runny nose for three weeks.She did not take a covid test.Her nasal drainage is clear.).    Presents today with concerns for possible sinus infection.  She reports she has had sinus pain and pressure for approximately the last 3 weeks.  She states that has been getting worse.  She has tried over-the-counter medications without improvement in her symptoms.  She denies shortness of breath.  She denies chest pain.  She denies elevated temperature.  She states she has been able to sleep with the use of her CPAP.  She continues to take her metformin and Farxiga as well as semaglutide once a week to help manage her blood sugars.  She does utilize her continuous glucose monitor and denies any low blood sugars.         Review of Systems   Constitutional:  Negative for activity change and appetite change.   HENT:  Negative for congestion, ear pain, hearing loss, sinus pain and sore throat.    Eyes:  Negative for photophobia, discharge and visual disturbance.   Respiratory:  Negative for cough, shortness of breath and wheezing.    Cardiovascular:  Negative for chest pain, palpitations and leg swelling.   Gastrointestinal:  Negative for abdominal pain, blood in stool, constipation and diarrhea.   Endocrine: Negative for cold intolerance, heat intolerance and polyuria.   Genitourinary:  Negative for dysuria and hematuria.   Musculoskeletal:  Negative for arthralgias, back pain and joint swelling.   Skin:  Negative for rash.   Allergic/Immunologic: Negative for environmental allergies and food allergies.   Neurological:  Negative for dizziness, tremors, syncope, weakness and headaches.   Hematological:  Negative for adenopathy. Does not bruise/bleed easily.   Psychiatric/Behavioral:  Negative for agitation and suicidal ideas. The patient is not nervous/anxious.        Objective   /80 (BP Location: Right  "arm, Patient Position: Sitting)   Pulse 66   Temp 36.3 °C (97.3 °F) (Temporal)   Ht 1.651 m (5' 5\")   Wt 97.2 kg (214 lb 3.2 oz)   SpO2 98%   BMI 35.64 kg/m²     Physical Exam  Constitutional:       General: She is not in acute distress.     Appearance: Normal appearance.   HENT:      Head: Normocephalic.      Right Ear: Tympanic membrane normal.      Left Ear: Tympanic membrane normal.      Nose: Nose normal.      Mouth/Throat:      Mouth: Mucous membranes are moist.   Eyes:      General: Lids are normal. Vision grossly intact.      Conjunctiva/sclera: Conjunctivae normal.      Pupils: Pupils are equal, round, and reactive to light.   Cardiovascular:      Rate and Rhythm: Normal rate and regular rhythm.      Pulses: Normal pulses.      Heart sounds: Normal heart sounds.   Pulmonary:      Effort: Pulmonary effort is normal.      Breath sounds: Normal breath sounds and air entry.   Abdominal:      General: Bowel sounds are normal.      Palpations: Abdomen is soft.   Musculoskeletal:         General: Normal range of motion.   Skin:     General: Skin is warm and dry.      Capillary Refill: Capillary refill takes less than 2 seconds.   Neurological:      Mental Status: She is alert and oriented to person, place, and time.   Psychiatric:         Mood and Affect: Mood normal.         Speech: Speech normal.         Assessment/Plan   Problem List Items Addressed This Visit             ICD-10-CM    Benign essential HTN I10    Restrictive lung disease J98.4    Type 2 diabetes mellitus without complication, without long-term current use of insulin (CMS/[facility]) E11.9    Acute bacterial sinusitis J01.90, B96.89    Relevant Medications    doxycycline (Vibramycin) 100 mg capsule    Lung nodules R91.8    SIMRAN on CPAP G47.33    Obesity, morbid (CMS/[facility]) - Primary E66.01    Pulmonary fibrosis, unspecified (CMS/[facility]) J84.10        Start doxycycline twice a day for 7days.  Take until finished even if you feel better before then  Focus " on healthy eating including lean proteins and vegetables.  Avoid high carbohydrate high sugar foods and drinks.  Try to increase physical activity as tolerated.  Goal is for 160 minutes/week of physical activity.  Check blood sugars 1-2 times per day.  Call for fasting blood sugars that are remaining greater than 150 or random blood sugars that are remaining greater than 180.  Check blood pressure 2-3 times a week.  Call for blood pressures greater than 140/90.  Bring list of blood pressures to next visit.

## 2023-11-30 ENCOUNTER — OFFICE VISIT (OUTPATIENT)
Dept: PRIMARY CARE | Facility: CLINIC | Age: 73
End: 2023-11-30
Payer: MEDICARE

## 2023-11-30 VITALS
HEART RATE: 66 BPM | BODY MASS INDEX: 35.42 KG/M2 | SYSTOLIC BLOOD PRESSURE: 122 MMHG | TEMPERATURE: 98.1 F | WEIGHT: 212.6 LBS | OXYGEN SATURATION: 98 % | DIASTOLIC BLOOD PRESSURE: 80 MMHG | HEIGHT: 65 IN

## 2023-11-30 DIAGNOSIS — J30.9 CHRONIC ALLERGIC RHINITIS: ICD-10-CM

## 2023-11-30 DIAGNOSIS — Z00.00 ROUTINE GENERAL MEDICAL EXAMINATION AT HEALTH CARE FACILITY: Primary | ICD-10-CM

## 2023-11-30 DIAGNOSIS — E11.9 TYPE 2 DIABETES MELLITUS WITHOUT COMPLICATION, WITHOUT LONG-TERM CURRENT USE OF INSULIN (MULTI): ICD-10-CM

## 2023-11-30 DIAGNOSIS — M32.9 LUPUS (MULTI): ICD-10-CM

## 2023-11-30 DIAGNOSIS — R53.83 FATIGUE, UNSPECIFIED TYPE: ICD-10-CM

## 2023-11-30 DIAGNOSIS — E03.9 HYPOTHYROIDISM, ADULT: ICD-10-CM

## 2023-11-30 DIAGNOSIS — R79.89 ABNORMAL TSH: ICD-10-CM

## 2023-11-30 DIAGNOSIS — K76.0 FATTY LIVER DISEASE, NONALCOHOLIC: ICD-10-CM

## 2023-11-30 DIAGNOSIS — I10 BENIGN ESSENTIAL HTN: ICD-10-CM

## 2023-11-30 DIAGNOSIS — Z12.31 BREAST CANCER SCREENING BY MAMMOGRAM: ICD-10-CM

## 2023-11-30 DIAGNOSIS — E78.2 MIXED HYPERLIPIDEMIA: ICD-10-CM

## 2023-11-30 LAB
ALBUMIN SERPL BCP-MCNC: 4.2 G/DL (ref 3.4–5)
ALP SERPL-CCNC: 105 U/L (ref 33–136)
ALT SERPL W P-5'-P-CCNC: 45 U/L (ref 7–45)
ANION GAP SERPL CALC-SCNC: 17 MMOL/L (ref 10–20)
AST SERPL W P-5'-P-CCNC: 26 U/L (ref 9–39)
BASOPHILS # BLD AUTO: 0.1 X10*3/UL (ref 0–0.1)
BASOPHILS NFR BLD AUTO: 1.1 %
BILIRUB SERPL-MCNC: 0.7 MG/DL (ref 0–1.2)
BUN SERPL-MCNC: 40 MG/DL (ref 6–23)
CALCIUM SERPL-MCNC: 9.7 MG/DL (ref 8.6–10.3)
CHLORIDE SERPL-SCNC: 103 MMOL/L (ref 98–107)
CHOLEST SERPL-MCNC: 122 MG/DL (ref 0–199)
CHOLESTEROL/HDL RATIO: 3.1
CO2 SERPL-SCNC: 23 MMOL/L (ref 21–32)
CREAT SERPL-MCNC: 1 MG/DL (ref 0.5–1.05)
EOSINOPHIL # BLD AUTO: 0.57 X10*3/UL (ref 0–0.4)
EOSINOPHIL NFR BLD AUTO: 6.3 %
ERYTHROCYTE [DISTWIDTH] IN BLOOD BY AUTOMATED COUNT: 14 % (ref 11.5–14.5)
GFR SERPL CREATININE-BSD FRML MDRD: 60 ML/MIN/1.73M*2
GLUCOSE SERPL-MCNC: 139 MG/DL (ref 74–99)
HCT VFR BLD AUTO: 42.8 % (ref 36–46)
HDLC SERPL-MCNC: 39.8 MG/DL
HGB BLD-MCNC: 13.9 G/DL (ref 12–16)
IMM GRANULOCYTES # BLD AUTO: 0.04 X10*3/UL (ref 0–0.5)
IMM GRANULOCYTES NFR BLD AUTO: 0.4 % (ref 0–0.9)
LDLC SERPL CALC-MCNC: 49 MG/DL
LYMPHOCYTES # BLD AUTO: 2.63 X10*3/UL (ref 0.8–3)
LYMPHOCYTES NFR BLD AUTO: 29.3 %
MCH RBC QN AUTO: 30 PG (ref 26–34)
MCHC RBC AUTO-ENTMCNC: 32.5 G/DL (ref 32–36)
MCV RBC AUTO: 92 FL (ref 80–100)
MONOCYTES # BLD AUTO: 0.63 X10*3/UL (ref 0.05–0.8)
MONOCYTES NFR BLD AUTO: 7 %
NEUTROPHILS # BLD AUTO: 5.02 X10*3/UL (ref 1.6–5.5)
NEUTROPHILS NFR BLD AUTO: 55.9 %
NON HDL CHOLESTEROL: 82 MG/DL (ref 0–149)
NRBC BLD-RTO: 0 /100 WBCS (ref 0–0)
PLATELET # BLD AUTO: 207 X10*3/UL (ref 150–450)
POC HEMOGLOBIN A1C: 8.4 % (ref 4.2–6.5)
POTASSIUM SERPL-SCNC: 4.5 MMOL/L (ref 3.5–5.3)
PROT SERPL-MCNC: 7.7 G/DL (ref 6.4–8.2)
RBC # BLD AUTO: 4.63 X10*6/UL (ref 4–5.2)
SODIUM SERPL-SCNC: 138 MMOL/L (ref 136–145)
TRIGL SERPL-MCNC: 168 MG/DL (ref 0–149)
TSH SERPL-ACNC: 4.45 MIU/L (ref 0.44–3.98)
VLDL: 34 MG/DL (ref 0–40)
WBC # BLD AUTO: 9 X10*3/UL (ref 4.4–11.3)

## 2023-11-30 PROCEDURE — 82570 ASSAY OF URINE CREATININE: CPT

## 2023-11-30 PROCEDURE — 80053 COMPREHEN METABOLIC PANEL: CPT

## 2023-11-30 PROCEDURE — 85025 COMPLETE CBC W/AUTO DIFF WBC: CPT

## 2023-11-30 PROCEDURE — 1160F RVW MEDS BY RX/DR IN RCRD: CPT | Performed by: FAMILY MEDICINE

## 2023-11-30 PROCEDURE — 84443 ASSAY THYROID STIM HORMONE: CPT

## 2023-11-30 PROCEDURE — 3079F DIAST BP 80-89 MM HG: CPT | Performed by: FAMILY MEDICINE

## 2023-11-30 PROCEDURE — 36415 COLL VENOUS BLD VENIPUNCTURE: CPT

## 2023-11-30 PROCEDURE — 3060F POS MICROALBUMINURIA REV: CPT | Performed by: FAMILY MEDICINE

## 2023-11-30 PROCEDURE — 1126F AMNT PAIN NOTED NONE PRSNT: CPT | Performed by: FAMILY MEDICINE

## 2023-11-30 PROCEDURE — 1170F FXNL STATUS ASSESSED: CPT | Performed by: FAMILY MEDICINE

## 2023-11-30 PROCEDURE — 82043 UR ALBUMIN QUANTITATIVE: CPT

## 2023-11-30 PROCEDURE — 3048F LDL-C <100 MG/DL: CPT | Performed by: FAMILY MEDICINE

## 2023-11-30 PROCEDURE — 3046F HEMOGLOBIN A1C LEVEL >9.0%: CPT | Performed by: FAMILY MEDICINE

## 2023-11-30 PROCEDURE — 80061 LIPID PANEL: CPT

## 2023-11-30 PROCEDURE — 84439 ASSAY OF FREE THYROXINE: CPT

## 2023-11-30 PROCEDURE — 3074F SYST BP LT 130 MM HG: CPT | Performed by: FAMILY MEDICINE

## 2023-11-30 PROCEDURE — G0439 PPPS, SUBSEQ VISIT: HCPCS | Performed by: FAMILY MEDICINE

## 2023-11-30 PROCEDURE — 4010F ACE/ARB THERAPY RXD/TAKEN: CPT | Performed by: FAMILY MEDICINE

## 2023-11-30 PROCEDURE — 1036F TOBACCO NON-USER: CPT | Performed by: FAMILY MEDICINE

## 2023-11-30 PROCEDURE — 1159F MED LIST DOCD IN RCRD: CPT | Performed by: FAMILY MEDICINE

## 2023-11-30 PROCEDURE — 83036 HEMOGLOBIN GLYCOSYLATED A1C: CPT | Performed by: FAMILY MEDICINE

## 2023-11-30 RX ORDER — CETIRIZINE HYDROCHLORIDE 10 MG/1
10 TABLET ORAL DAILY
Qty: 30 TABLET | Refills: 11 | Status: SHIPPED | OUTPATIENT
Start: 2023-11-30 | End: 2024-11-29

## 2023-11-30 ASSESSMENT — ENCOUNTER SYMPTOMS
PALPITATIONS: 0
ARTHRALGIAS: 0
DEPRESSION: 0
ABDOMINAL PAIN: 0
BRUISES/BLEEDS EASILY: 0
SHORTNESS OF BREATH: 0
SORE THROAT: 0
DYSPHORIC MOOD: 0
HEMATURIA: 0
HEADACHES: 0
OCCASIONAL FEELINGS OF UNSTEADINESS: 0
CHILLS: 0
FEVER: 0
EYE DISCHARGE: 1
DIZZINESS: 0
COUGH: 0
LOSS OF SENSATION IN FEET: 0
WHEEZING: 0
NERVOUS/ANXIOUS: 0
FATIGUE: 0
NAUSEA: 0
CONSTIPATION: 0
DIARRHEA: 0
DYSURIA: 0
JOINT SWELLING: 0
FREQUENCY: 0
VOMITING: 0

## 2023-11-30 ASSESSMENT — ACTIVITIES OF DAILY LIVING (ADL)
TAKING_MEDICATION: INDEPENDENT
MANAGING_FINANCES: INDEPENDENT
DRESSING: INDEPENDENT
BATHING: INDEPENDENT
DOING_HOUSEWORK: INDEPENDENT
GROCERY_SHOPPING: INDEPENDENT

## 2023-11-30 ASSESSMENT — PATIENT HEALTH QUESTIONNAIRE - PHQ9
1. LITTLE INTEREST OR PLEASURE IN DOING THINGS: NOT AT ALL
2. FEELING DOWN, DEPRESSED OR HOPELESS: NOT AT ALL
SUM OF ALL RESPONSES TO PHQ9 QUESTIONS 1 AND 2: 0

## 2023-11-30 ASSESSMENT — PAIN SCALES - GENERAL: PAINLEVEL: 0-NO PAIN

## 2023-11-30 NOTE — PROGRESS NOTES
"Subjective   Patient ID: Yolanda Zabala is a 73 y.o. female who presents for Annual Exam.    HPI     Review of Systems    Objective   /80   Pulse 66   Temp 36.7 °C (98.1 °F)   Ht 1.651 m (5' 5\")   Wt 96.4 kg (212 lb 9.6 oz)   SpO2 98%   BMI 35.38 kg/m²     Physical Exam    Assessment/Plan          "

## 2023-11-30 NOTE — PROGRESS NOTES
"Subjective   Reason for Visit: Yolanda Zabala is an 73 y.o. female here for a Medicare Wellness visit.     Past Medical, Surgical, and Family History reviewed and updated in chart.    Reviewed all medications by prescribing practitioner or clinical pharmacist (such as prescriptions, OTCs, herbal therapies and supplements) and documented in the medical record.    HPIdifficulty with finances to buy healthy food. Was going to apply for meals on wheels. Gets food from food bank. Feels ok overall except sinuses still an issue, sneezes 5-7 times and watery eyes.     Patient Care Team:  Caridad Lincoln MD as PCP - General  Caridad Lincoln MD as PCP - [employer] PCP     Review of Systems   Constitutional:  Negative for chills, fatigue and fever.   HENT:  Positive for congestion and postnasal drip. Negative for ear pain, hearing loss, sore throat and tinnitus.    Eyes:  Positive for discharge. Negative for visual disturbance.   Respiratory:  Negative for cough, shortness of breath and wheezing.    Cardiovascular:  Negative for chest pain and palpitations.   Gastrointestinal:  Negative for abdominal pain, constipation, diarrhea, nausea and vomiting.   Endocrine: Negative for polyuria.   Genitourinary:  Negative for dysuria, frequency, hematuria and urgency.   Musculoskeletal:  Negative for arthralgias, gait problem and joint swelling.   Skin:  Negative for rash.   Neurological:  Negative for dizziness, syncope and headaches.   Hematological:  Does not bruise/bleed easily.   Psychiatric/Behavioral:  Negative for dysphoric mood. The patient is not nervous/anxious.        Objective   Vitals:  /80   Pulse 66   Temp 36.7 °C (98.1 °F)   Ht 1.651 m (5' 5\")   Wt 96.4 kg (212 lb 9.6 oz)   SpO2 98%   BMI 35.38 kg/m²       Physical Exam  Vitals reviewed.   Constitutional:       General: She is not in acute distress.     Appearance: Normal appearance.   HENT:      Head: Normocephalic.      " Right Ear: Tympanic membrane, ear canal and external ear normal.      Left Ear: Tympanic membrane, ear canal and external ear normal.      Nose: Nose normal.      Mouth/Throat:      Mouth: Mucous membranes are moist.      Pharynx: Oropharynx is clear.   Eyes:      Extraocular Movements: Extraocular movements intact.      Conjunctiva/sclera: Conjunctivae normal.      Pupils: Pupils are equal, round, and reactive to light.   Cardiovascular:      Rate and Rhythm: Normal rate and regular rhythm.      Pulses: Normal pulses.      Heart sounds: No murmur heard.  Pulmonary:      Effort: Pulmonary effort is normal.      Breath sounds: Normal breath sounds. No wheezing or rhonchi.   Abdominal:      General: Bowel sounds are normal. There is no distension.      Palpations: Abdomen is soft.      Tenderness: There is no abdominal tenderness. There is no rebound.   Musculoskeletal:      Right lower leg: No edema.      Left lower leg: No edema.   Skin:     General: Skin is warm.      Coloration: Skin is not jaundiced.   Neurological:      General: No focal deficit present.      Mental Status: She is alert and oriented to person, place, and time.      Cranial Nerves: No cranial nerve deficit.      Gait: Gait normal.   Psychiatric:         Mood and Affect: Mood normal.         Assessment/Plan   Problem List Items Addressed This Visit       Benign essential HTN    Relevant Orders    CBC and Auto Differential    Hyperlipidemia    Relevant Orders    Comprehensive Metabolic Panel    Lipid Panel    Hypothyroidism, adult    Relevant Orders    Thyroid Stimulating Hormone    Type 2 diabetes mellitus without complication, without long-term current use of insulin (CMS/HCC)    Relevant Orders    POCT glycosylated hemoglobin (Hb A1C) manually resulted (Completed)    Albumin , Urine Random    Follow Up In Primary Care - Established    Fatty liver disease, nonalcoholic    Lupus (CMS/HCC)     Other Visit Diagnoses       Routine general medical  examination at health care facility    -  Primary    Relevant Medications    cetirizine (ZyrTEC) 10 mg tablet    Other Relevant Orders    1 Year Follow Up In Primary Care - Wellness Exam    Breast cancer screening by mammogram        Relevant Orders    BI mammo bilateral screening tomosynthesis    Chronic allergic rhinitis            Add zyrtec for allergy sx with nose spray she has  To ask about medicaid -as secondary- has  at [facility]  Reviewed low-fat low-cholesterol low-salt diet choices as well as encouraged regular physical activity for 30 minutes 4 times a week.  Mammogram ordered. Colonoscopy up-to-date.  Immunizations reviewed. Revewed age-appropriate screening recommendations.  Laboratory studies ordered.

## 2023-12-01 LAB
CREAT UR-MCNC: 91.2 MG/DL (ref 20–320)
MICROALBUMIN UR-MCNC: 108.7 MG/L
MICROALBUMIN/CREAT UR: 119.2 UG/MG CREAT
T4 FREE SERPL-MCNC: 0.88 NG/DL (ref 0.61–1.12)

## 2023-12-12 ENCOUNTER — HOSPITAL ENCOUNTER (OUTPATIENT)
Dept: RADIOLOGY | Facility: HOSPITAL | Age: 73
Discharge: HOME | End: 2023-12-12
Payer: MEDICARE

## 2023-12-12 DIAGNOSIS — Z12.31 BREAST CANCER SCREENING BY MAMMOGRAM: ICD-10-CM

## 2023-12-12 PROCEDURE — 77067 SCR MAMMO BI INCL CAD: CPT

## 2023-12-12 PROCEDURE — 77067 SCR MAMMO BI INCL CAD: CPT | Performed by: RADIOLOGY

## 2023-12-12 PROCEDURE — 77063 BREAST TOMOSYNTHESIS BI: CPT | Performed by: RADIOLOGY

## 2023-12-14 ENCOUNTER — TELEPHONE (OUTPATIENT)
Dept: PRIMARY CARE | Facility: CLINIC | Age: 73
End: 2023-12-14
Payer: MEDICARE

## 2023-12-14 NOTE — TELEPHONE ENCOUNTER
Pt called office stating that you prescribed pt some allergy medication pt stated her nose is still stuffed up and is wondering if you could call her in something to clear it up. [facility] in [place].

## 2023-12-22 ENCOUNTER — TELEPHONE (OUTPATIENT)
Dept: PRIMARY CARE | Facility: CLINIC | Age: 73
End: 2023-12-22
Payer: MEDICARE

## 2023-12-22 NOTE — TELEPHONE ENCOUNTER
Pt called office stating that she is not having bowel movements and it is painful. Pt is wondering what you suggest.

## 2023-12-22 NOTE — TELEPHONE ENCOUNTER
Senna tablets twice a day, Miralax twice a day, or bisacodyl twice a day  If they are not effective, Fleets enema

## 2024-01-08 ENCOUNTER — LAB (OUTPATIENT)
Dept: LAB | Facility: LAB | Age: 74
End: 2024-01-08
Payer: MEDICARE

## 2024-01-08 DIAGNOSIS — N18.30 CHRONIC KIDNEY DISEASE, STAGE 3 UNSPECIFIED (MULTI): ICD-10-CM

## 2024-01-08 DIAGNOSIS — N18.31 CHRONIC KIDNEY DISEASE, STAGE 3A (MULTI): Primary | ICD-10-CM

## 2024-01-08 LAB
ALBUMIN SERPL BCP-MCNC: 3.9 G/DL (ref 3.4–5)
ANION GAP SERPL CALC-SCNC: 17 MMOL/L (ref 10–20)
APPEARANCE UR: CLEAR
BACTERIA #/AREA URNS AUTO: ABNORMAL /HPF
BILIRUB UR STRIP.AUTO-MCNC: NEGATIVE MG/DL
BUN SERPL-MCNC: 26 MG/DL (ref 6–23)
CALCIUM SERPL-MCNC: 10.1 MG/DL (ref 8.6–10.3)
CHLORIDE SERPL-SCNC: 102 MMOL/L (ref 98–107)
CO2 SERPL-SCNC: 27 MMOL/L (ref 21–32)
COLOR UR: YELLOW
CREAT SERPL-MCNC: 0.99 MG/DL (ref 0.5–1.05)
CREAT UR-MCNC: 54.9 MG/DL (ref 20–320)
CREAT UR-MCNC: 58.4 MG/DL (ref 20–320)
EGFRCR SERPLBLD CKD-EPI 2021: 61 ML/MIN/1.73M*2
GLUCOSE SERPL-MCNC: 181 MG/DL (ref 74–99)
GLUCOSE UR STRIP.AUTO-MCNC: ABNORMAL MG/DL
KETONES UR STRIP.AUTO-MCNC: NEGATIVE MG/DL
LEUKOCYTE ESTERASE UR QL STRIP.AUTO: ABNORMAL
MICROALBUMIN UR-MCNC: 330.4 MG/L
MICROALBUMIN/CREAT UR: 565.8 UG/MG CREAT
MUCOUS THREADS #/AREA URNS AUTO: ABNORMAL /LPF
NITRITE UR QL STRIP.AUTO: NEGATIVE
PH UR STRIP.AUTO: 6 [PH]
PHOSPHATE SERPL-MCNC: 4 MG/DL (ref 2.5–4.9)
POTASSIUM SERPL-SCNC: 5.3 MMOL/L (ref 3.5–5.3)
PROT UR STRIP.AUTO-MCNC: ABNORMAL MG/DL
PROT UR-ACNC: 52 MG/DL (ref 5–24)
PROT/CREAT UR: 0.95 MG/MG CREAT (ref 0–0.17)
RBC # UR STRIP.AUTO: NEGATIVE /UL
RBC #/AREA URNS AUTO: ABNORMAL /HPF
SODIUM SERPL-SCNC: 141 MMOL/L (ref 136–145)
SP GR UR STRIP.AUTO: 1.02
SQUAMOUS #/AREA URNS AUTO: ABNORMAL /HPF
URATE SERPL-MCNC: 4.9 MG/DL (ref 2.3–6.7)
UROBILINOGEN UR STRIP.AUTO-MCNC: <2 MG/DL
WBC #/AREA URNS AUTO: ABNORMAL /HPF

## 2024-01-08 PROCEDURE — 81001 URINALYSIS AUTO W/SCOPE: CPT

## 2024-01-08 PROCEDURE — 82570 ASSAY OF URINE CREATININE: CPT

## 2024-01-08 PROCEDURE — 80069 RENAL FUNCTION PANEL: CPT

## 2024-01-08 PROCEDURE — 36415 COLL VENOUS BLD VENIPUNCTURE: CPT

## 2024-01-08 PROCEDURE — 84156 ASSAY OF PROTEIN URINE: CPT

## 2024-01-08 PROCEDURE — 82306 VITAMIN D 25 HYDROXY: CPT

## 2024-01-08 PROCEDURE — 83970 ASSAY OF PARATHORMONE: CPT

## 2024-01-08 PROCEDURE — 84550 ASSAY OF BLOOD/URIC ACID: CPT

## 2024-01-08 PROCEDURE — 82043 UR ALBUMIN QUANTITATIVE: CPT

## 2024-01-09 LAB
25(OH)D3 SERPL-MCNC: 34 NG/ML (ref 30–100)
PTH-INTACT SERPL-MCNC: 31.4 PG/ML (ref 18.5–88)

## 2024-01-17 DIAGNOSIS — I10 BENIGN ESSENTIAL HTN: ICD-10-CM

## 2024-01-17 DIAGNOSIS — E11.9 TYPE 2 DIABETES MELLITUS WITHOUT COMPLICATION, WITHOUT LONG-TERM CURRENT USE OF INSULIN (MULTI): ICD-10-CM

## 2024-01-17 DIAGNOSIS — E03.9 HYPOTHYROIDISM, ADULT: ICD-10-CM

## 2024-01-17 RX ORDER — LOSARTAN POTASSIUM 50 MG/1
50 TABLET ORAL 2 TIMES DAILY
Qty: 180 TABLET | Refills: 3 | Status: SHIPPED | OUTPATIENT
Start: 2024-01-17 | End: 2025-01-16

## 2024-01-17 RX ORDER — VITAMIN B COMPLEX
1 CAPSULE ORAL
COMMUNITY
Start: 2023-04-18 | End: 2024-04-17

## 2024-01-17 RX ORDER — LEVOTHYROXINE SODIUM 100 UG/1
100 TABLET ORAL DAILY
Qty: 90 TABLET | Refills: 3 | Status: SHIPPED | OUTPATIENT
Start: 2024-01-17 | End: 2025-01-16

## 2024-01-18 RX ORDER — DAPAGLIFLOZIN 5 MG/1
TABLET, FILM COATED ORAL
Qty: 180 TABLET | Refills: 1 | Status: SHIPPED | OUTPATIENT
Start: 2024-01-18 | End: 2024-01-22 | Stop reason: SDUPTHER

## 2024-01-22 ENCOUNTER — OFFICE VISIT (OUTPATIENT)
Dept: NEPHROLOGY | Facility: CLINIC | Age: 74
End: 2024-01-22
Payer: MEDICARE

## 2024-01-22 VITALS
SYSTOLIC BLOOD PRESSURE: 144 MMHG | RESPIRATION RATE: 16 BRPM | TEMPERATURE: 96.9 F | WEIGHT: 217 LBS | BODY MASS INDEX: 36.15 KG/M2 | HEART RATE: 59 BPM | HEIGHT: 65 IN | OXYGEN SATURATION: 97 % | DIASTOLIC BLOOD PRESSURE: 82 MMHG

## 2024-01-22 DIAGNOSIS — E11.9 TYPE 2 DIABETES MELLITUS WITHOUT COMPLICATION, WITHOUT LONG-TERM CURRENT USE OF INSULIN (MULTI): Primary | ICD-10-CM

## 2024-01-22 DIAGNOSIS — I10 BENIGN ESSENTIAL HTN: ICD-10-CM

## 2024-01-22 PROCEDURE — 3075F SYST BP GE 130 - 139MM HG: CPT | Performed by: INTERNAL MEDICINE

## 2024-01-22 PROCEDURE — 3079F DIAST BP 80-89 MM HG: CPT | Performed by: INTERNAL MEDICINE

## 2024-01-22 PROCEDURE — 1126F AMNT PAIN NOTED NONE PRSNT: CPT | Performed by: INTERNAL MEDICINE

## 2024-01-22 PROCEDURE — 99215 OFFICE O/P EST HI 40 MIN: CPT | Performed by: INTERNAL MEDICINE

## 2024-01-22 PROCEDURE — 1036F TOBACCO NON-USER: CPT | Performed by: INTERNAL MEDICINE

## 2024-01-22 PROCEDURE — 3062F POS MACROALBUMINURIA REV: CPT | Performed by: INTERNAL MEDICINE

## 2024-01-22 PROCEDURE — 4010F ACE/ARB THERAPY RXD/TAKEN: CPT | Performed by: INTERNAL MEDICINE

## 2024-01-22 PROCEDURE — 1159F MED LIST DOCD IN RCRD: CPT | Performed by: INTERNAL MEDICINE

## 2024-01-22 PROCEDURE — 1160F RVW MEDS BY RX/DR IN RCRD: CPT | Performed by: INTERNAL MEDICINE

## 2024-01-22 PROCEDURE — 3066F NEPHROPATHY DOC TX: CPT | Performed by: INTERNAL MEDICINE

## 2024-01-22 RX ORDER — DAPAGLIFLOZIN 10 MG/1
TABLET, FILM COATED ORAL
Qty: 30 TABLET | Refills: 11 | Status: SHIPPED | OUTPATIENT
Start: 2024-01-22

## 2024-01-22 NOTE — PROGRESS NOTES
"Subjective       Yolanda Zabala is a 73 y.o. female who has past medical history of hypertension, recurrent gout, hypothyroidism, systemic lupus erythematosus, type 2 diabetes was coming to see me today for proteinuric CKD follow-up    Last office visit July 2023.  We discussed conservative measures.  We discussed healthier diet.  Today, Yolanda came alone.  She reports trying to be on healthier diet.  She is trying to avoid processed meat as possible.  She increased fresh fruits and vegetables.  She increase Farxiga 10 mg as instructed.  She reports that she is very low income and healthier food options are not available easily.  She denies any problems getting her medications-all are fully covered including Farxiga.  Repeat blood work showed stable serum creatinine 0.9 and GFR 60.  Potassium is borderline high 5.3.  Albuminuria is still significant 565 mg/g.  No leg swelling or shortness of breath.  She reports muscle pain especially around the axilla.    Prior notes  Yolanda came alone today. She reports long history of systemic lupus. She has been on hydroxychloroquine for some time. She gets her eye checked annually. She saw nephrology in the past and that she reports undergoing kidney biopsy-she cannot remember what wild the results at that time. She denies lower urine tract symptoms. She has uncontrolled diabetes with A1c 12.3%. She does not endorse signs of lupus activities. She continues to follow with rheumatology for lupus     Social history: Non-smoker, unemployed  Family history: Irrelevant  Surgical history: Irrelevant          Objective   /82 (BP Location: Left arm, Patient Position: Standing, BP Cuff Size: Large adult)   Pulse 59   Temp 36.1 °C (96.9 °F)   Resp 16   Ht 1.651 m (5' 5\")   Wt 98.4 kg (217 lb)   SpO2 97%   BMI 36.11 kg/m²   Wt Readings from Last 3 Encounters:   01/22/24 98.4 kg (217 lb)   11/30/23 96.4 kg (212 lb 9.6 oz)   11/22/23 97.2 kg (214 lb 3.2 oz)       Physical " "Exam    General appearance: no distress awake and alert on room air, euvolemic on exam, obese  Eyes: non-icteric  HEENT: atrumatic head, PEERLA, moist mucosa  Skin: no apparent rash  Heart: NSR, S1, S2 normal, no murmur or gallop  Lungs: Symmetrical expansion,CTA bilat no wheezing/crackles  Abdomen: soft, nt/nd, obese  Extremities: no edema bilat  Neuro: No FND,asterixis, no focal deficits noticed        Review of Systems     Constitutional: no fever, no chills, no recent weight gain and no recent weight loss.   Eyes: no blurred vision and no diplopia.   ENT: no hearing loss, no earache, no sore throat, no swollen glands in the neck and no nasal discharge.   Cardiovascular: no chest pain, no palpitations and no lower extremity edema.   Respiratory: no shortness of breath, no chronic cough and no shortness of breath during exertion.   Gastrointestinal: no abdominal pain, no constipation, no heartburn, no vomiting, no bloody stools and no change in bowel movements.   Genitourinary: no dysuria and no hematuria.   Musculoskeletal myalgia  Skin: no rashes and no skin lesions.   Neurological: no headaches and no dizziness.   Psychiatric: no confusion, no depression and no anxiety.   Endocrine: no heat intolerance, no cold intolerance, appetite not increased, no thyroid disorder, no increased urinary frequency and no dry skin.   Hematologic/Lymphatic: does not bleed easily and does not bruise easily.   All other systems have been reviewed and are negative for complaint.         Data Review                   Lab Results   Component Value Date    URICACID 4.9 01/08/2024           Lab Results   Component Value Date    HGBA1C 8.4 (A) 11/30/2023                 No lab exists for component: \"CR\", \"PHOSPHORUS\"        Albumin/Creatine Ratio   Date Value Ref Range Status   01/08/2024 565.8 (H) <30.0 ug/mg Creat Final   11/30/2023 119.2 (H) <30.0 ug/mg Creat Final   08/08/2022 154.0 (H) 0.0 - 30.0 ug/mg crt Final        "     RFP  Recent Labs     01/08/24  1118 11/30/23  1056 08/11/23  1440 04/19/23  1211 02/27/23  1103 12/05/22  1415 08/08/22  0912 10/27/20  1300 09/25/20  1228 09/22/20  1236 09/19/20  1807 09/19/20  1557    138 139 137 137 137 140   < > 141   < > 142 143   K 5.3 4.5 4.3 5.0 5.1 5.2 5.5*   < > 5.2   < > 4.8 5.2*    103 101 98 101 106 106   < > 106   < > 109* 108*   CO2 27 23 25 26 25 20* 25   < > 27   < > 19* 20*   BUN 26* 40* 27* 22 33* 32* 31*   < > 35*   < > 41* 43*   CREATININE 0.99 1.00 0.97 1.01 0.97 0.91 1.00   < > 0.93   < > 1.0 1.0   GLUCOSE 181* 139* 140* 406* 166* 125* 115*   < > 95   < > 59* 80   CALCIUM 10.1 9.7 9.8 9.6 10.6* 10.6* 9.8   < > 9.9   < > 9.5 10.3   PHOS 4.0  --   --   --   --   --   --   --  3.7  --   --   --    EGFR 61 60*  --   --   --   --   --   --   --   --  59 59   ANIONGAP 17 17 17 18 16 16 15   < > 13   < > 14 15    < > = values in this interval not displayed.        Urineanalysis  Recent Labs     01/08/24  1118 02/27/23  1103 11/03/21  1318 03/22/21  0712 09/25/20  1459 09/19/20  1755 11/08/18  1549 11/06/17  1554   COLORU Yellow  --   --  YELLOW YELLOW YELLOW YELLOW  --    APPEARANCEU Clear  --   --  CLEAR CLEAR CLEAR HAZY  --    SPECGRAVU 1.021  --   --  1.015 1.015 1.026 1.020  --    PEEWEE 6.0  --   --  5.0 5.0 5.5 5.0  --    PROTUR 100 (2+)* 59* 75* 100(2+)* NEGATIVE 30* 100(2+)* 46*   GLUCOSEU >=500 (3+)*  --   --  NEGATIVE NEGATIVE NEGATIVE NEGATIVE  --    BLOODU NEGATIVE  --   --  NEGATIVE NEGATIVE NEGATIVE NEGATIVE  --    KETONESU NEGATIVE  --   --  NEGATIVE NEGATIVE NEGATIVE NEGATIVE  --    BILIRUBINU NEGATIVE  --   --  NEGATIVE NEGATIVE NEGATIVE NEGATIVE  --    NITRITEU NEGATIVE  --   --  NEGATIVE NEGATIVE NEGATIVE POSITIVE*  --    LEUKOCYTESU SMALL (1+)*  --   --  NEGATIVE NEGATIVE SMALL* NEGATIVE  --        Urine Electrolytes  Recent Labs     01/08/24  1118 11/30/23  1056 02/27/23  1103 08/08/22  0912 11/03/21  1318 07/27/21  1152 03/22/21  0712  09/25/20  1459 09/19/20  1755 11/08/18  1549 11/06/17  1554   CREATU 58.4  54.9 91.2 92.4 81.1 81.1 62.1  --   --   --   --  90.0   PROTUR 100 (2+)*  --  59*  --  75*  --  100(2+)* NEGATIVE 30* 100(2+)* 46*   UTPCR 0.95*  --  0.64*  --  0.92*  --   --   --   --   --  0.51   ALBUMINUR 330.4 108.7  --   --   --   --   --   --   --   --   --    MICROALBCREA 565.8* 119.2*  --  154.0*  --  162.0*  --   --   --   --   --         Urine Micro  Recent Labs     01/08/24  1118 03/22/21  0712 09/19/20  1755 11/08/18  1549 11/06/17  1554   WBCU 1-5 3 6* 5-20* 5-20*   RBCU NONE 1 1 0-5 0-5   HYALCASTU  --   --  3  --   --    SQUAMEPIU 1-9 (SPARSE) <1 FEW 2+ 1+   BACTERIAU 1+*  --  POSITIVE 4+* 4+*   MUCUSU FEW FEW  --   --   --         Iron  Recent Labs     08/11/23  1440   IRON 56   TIBC 420   IRONSAT 13*   FERRITIN 333*          Current Outpatient Medications on File Prior to Visit   Medication Sig Dispense Refill    albuterol 90 mcg/actuation inhaler Inhale 2 puffs every 6 hours if needed for wheezing. 18 g 3    allopurinol (Zyloprim) 100 mg tablet Take 3 tablets (300 mg) by mouth once daily. 270 tablet 3    atorvastatin (Lipitor) 20 mg tablet Take 1 tablet (20 mg) by mouth once daily. 90 tablet 3    dapagliflozin propanediol (Farxiga) 5 mg Take two  5 mg tablets orally daily 180 tablet 1    FreeStyle Mikhail reader (FreeStyle Mikhail 2 [place]) misc Use as instructed 1 each 0    FreeStyle Mikhail sensor system (FreeStyle Mikhail 2 Sensor) kit Use as instructed 6 each 3    hydroxychloroquine (Plaquenil) 200 mg tablet Take 1 tablet (200 mg) by mouth once daily.      latanoprost (Xalatan) 0.005 % ophthalmic solution INSTILL 1 DROP INTO EACH EYE ONCE DAILY AT NIGHT      levothyroxine (Synthroid, Levoxyl) 100 mcg tablet Take 1 tablet (100 mcg) by mouth once daily. 90 tablet 3    losartan (Cozaar) 50 mg tablet Take 1 tablet (50 mg) by mouth 2 times a day. 180 tablet 3    metFORMIN (Glucophage) 1,000 mg tablet Take 1 tablet (1,000 mg) by  mouth 2 times a day with meals. Take with morning and evening meal. 180 tablet 3    metoprolol tartrate (Lopressor) 100 mg tablet Take 0.5 tablets (50 mg) by mouth 2 times a day. 180 tablet 3    semaglutide 0.25 mg or 0.5 mg (2 mg/3 mL) pen injector Inject 0.5 mg under the skin 1 (one) time per week. 9 mL 3    ascorbic acid (Vitamin C) 500 mg tablet Take 1 tablet (500 mg) by mouth once daily.      b complex vitamins capsule Take 1 capsule by mouth once daily.      blood sugar diagnostic strip Please prescribe strips compatible with Relion Premier Classic (Patient not taking: Reported on 1/22/2024) 100 strip 3    cetirizine (ZyrTEC) 10 mg tablet Take 1 tablet (10 mg) by mouth once daily. (Patient not taking: Reported on 1/22/2024) 30 tablet 11    clotrimazole (Lotrimin) 1 % cream APPLY CREAM TOPICALLY AND SPARINGLY TO THE AFFECTED AREA TWICE DAILY      metoprolol succinate XL (Kapspargo) 50 mg 24 hr capsule once every 24 hours.      pioglitazone (Actos) 30 mg tablet once every 24 hours.      [DISCONTINUED] dapagliflozin propanediol (Farxiga) 5 mg Take 1 tablet (5 mg) by mouth once daily in the morning. Take before meals. (Patient taking differently: Take 1 tablet (5 mg) by mouth once daily in the morning. Take before meals. She takes 2 tablets daily) 90 tablet 3    [DISCONTINUED] levothyroxine (Synthroid, Levoxyl) 100 mcg tablet Take 1 tablet (100 mcg) by mouth once daily. 90 tablet 3    [DISCONTINUED] losartan (Cozaar) 50 mg tablet Take 1 tablet (50 mg) by mouth 2 times a day. 180 tablet 3     No current facility-administered medications on file prior to visit.           Assessment and Plan       Yolanda Zabala  is a 73 y.o. female who has past medical history of hypertension, recurrent gout, hypothyroidism, systemic lupus erythematosus, type 2 diabetes was coming to see me for CKD follow-up    Impression  # None nephrotic range proteinuria most likely uncontrolled diabetic nephropathy  -Serum creatinine 1-1.1 GFR  around 60 mill per minute per 1.73 mÂ²  -Urine dipstick earlier is bland with no blood or albumin, significant 3+ glucose in the urine consistent with SGLT2 inhibitors intake  -Spot test ACR is worsening 565 mg/g from prior 119 mg/g (in spite of intensifying RAAS inhibitors and SGL 2 inhibitors)  -Proteinuric CKD is most likely diabetic nephropathy. Less likely lupus nephritis in the absence of hematuria and with negative lupus markers  -We will continue RAAS inhibitors. Will continue SGL 2 inhibitors Farxiga 10 mg.  She will need diabetes control.  Will consider starting Kerendia however borderline hyperkalemia will mandate possibly potassium binder with it.  Will discuss with follow-up if albuminuria remains to get worse     #Type 2 diabetes-uncontrolled  -Hemoglobin A1c 12.3%--> 8.4  -Current medication metformin 1000 mg twice daily-no need for dose adjustment GFR above 60  -Need strict diabetes control. Will consider insulin-per PCP     #SLE  -Within normal C3-C4, negative anti-double-stranded DNA  -On hydroxychloroquine  -Instructed against NSAID use     #No significant anemia globin 14        #Within normal electrolytes.  Borderline hyperkalemia 5.3.  Will monitor.  No need for immediate intervention     Follow-up in 6 months with repeat blood work and urinalysis prior to next visit           Neal Tanner MD, MS, KEVIN MEEKS  Clinical  - [employer]  Nephrologist - [facility] - [facility]

## 2024-01-22 NOTE — PATIENT INSTRUCTIONS
Dear NIKKIE   It was nice seeing you in the nephrology clinic today     Today we discussed the following:     #Chronic kidney disease stage III-baseline kidney function 50-60%-most likely related to diabetes     #Protein leak in the urine-most like related to uncontrolled diabetes. Less likely related to lupus.  Continue Farxiga 10 mg and continue losartan 50 mg twice daily.  Today we discussed starting Kerendia-will hold until follow-up    #Uncontrolled diabetes-will need to get hemoglobin A1c less than 7.5     #Avoid taking meloxicam/Mobic-this medication can cause further damage to your kidneys     #Limit salt intake, limit processed meat.  Increase fresh fruits and vegetables     Follow-up in 6 months with repeat blood work and urinalysis prior to next visit     Please call our office if you have any question  Thank you for coming to see me today     Neal Tanner MD, MS, KEVIN MEEKS  Clinical  - [employer]  Nephrologist - [facility] - [facility]

## 2024-02-26 ENCOUNTER — OFFICE VISIT (OUTPATIENT)
Dept: RHEUMATOLOGY | Facility: CLINIC | Age: 74
End: 2024-02-26
Payer: MEDICARE

## 2024-02-26 ENCOUNTER — LAB (OUTPATIENT)
Dept: LAB | Facility: LAB | Age: 74
End: 2024-02-26
Payer: MEDICARE

## 2024-02-26 VITALS
WEIGHT: 217 LBS | HEART RATE: 62 BPM | SYSTOLIC BLOOD PRESSURE: 123 MMHG | HEIGHT: 65 IN | BODY MASS INDEX: 36.15 KG/M2 | DIASTOLIC BLOOD PRESSURE: 82 MMHG

## 2024-02-26 DIAGNOSIS — M32.9 LUPUS (MULTI): Primary | ICD-10-CM

## 2024-02-26 DIAGNOSIS — M32.9 LUPUS (MULTI): ICD-10-CM

## 2024-02-26 LAB
C3 SERPL-MCNC: 180 MG/DL (ref 87–200)
C4 SERPL-MCNC: 26 MG/DL (ref 10–50)
CRP SERPL-MCNC: 0.85 MG/DL
DSDNA AB SER-ACNC: 1 IU/ML
ERYTHROCYTE [SEDIMENTATION RATE] IN BLOOD BY WESTERGREN METHOD: 28 MM/H (ref 0–30)

## 2024-02-26 PROCEDURE — 1036F TOBACCO NON-USER: CPT | Performed by: INTERNAL MEDICINE

## 2024-02-26 PROCEDURE — 3062F POS MACROALBUMINURIA REV: CPT | Performed by: INTERNAL MEDICINE

## 2024-02-26 PROCEDURE — 86160 COMPLEMENT ANTIGEN: CPT

## 2024-02-26 PROCEDURE — 86225 DNA ANTIBODY NATIVE: CPT

## 2024-02-26 PROCEDURE — 1126F AMNT PAIN NOTED NONE PRSNT: CPT | Performed by: INTERNAL MEDICINE

## 2024-02-26 PROCEDURE — 3074F SYST BP LT 130 MM HG: CPT | Performed by: INTERNAL MEDICINE

## 2024-02-26 PROCEDURE — 1157F ADVNC CARE PLAN IN RCRD: CPT | Performed by: INTERNAL MEDICINE

## 2024-02-26 PROCEDURE — 3079F DIAST BP 80-89 MM HG: CPT | Performed by: INTERNAL MEDICINE

## 2024-02-26 PROCEDURE — 85652 RBC SED RATE AUTOMATED: CPT

## 2024-02-26 PROCEDURE — 99214 OFFICE O/P EST MOD 30 MIN: CPT | Performed by: INTERNAL MEDICINE

## 2024-02-26 PROCEDURE — 36415 COLL VENOUS BLD VENIPUNCTURE: CPT

## 2024-02-26 PROCEDURE — 4010F ACE/ARB THERAPY RXD/TAKEN: CPT | Performed by: INTERNAL MEDICINE

## 2024-02-26 PROCEDURE — 1159F MED LIST DOCD IN RCRD: CPT | Performed by: INTERNAL MEDICINE

## 2024-02-26 PROCEDURE — 86140 C-REACTIVE PROTEIN: CPT

## 2024-02-26 RX ORDER — HYDROXYCHLOROQUINE SULFATE 200 MG/1
200 TABLET, FILM COATED ORAL DAILY
Qty: 30 TABLET | Refills: 11 | Status: SHIPPED | OUTPATIENT
Start: 2024-02-26 | End: 2025-02-25

## 2024-02-26 NOTE — PROGRESS NOTES
"Subjective   Patient ID: Yolanda Zabala is a 74 y.o. female who presents for Follow-up (Annual follow up ).  HPI  She is here for follow up on her LUPUS. She is feeling good, no complains today and no side effect of her medication. Patient is on Plaquenil. She has an appointment coming up next month. She sees Dr. Tanner for protein leakage for which Dr. WONG suggested a special diet with less protein and more vegetable and fruits. She mentioned that she was not able to tolerate this diet.     Denies new onset headaches, fever, chills, n/v/d, chest pain, SOB, abdominal pain, urinary symptoms, and lower extremity edema.     Review of Systems  All other systems have been reviewed and are negative.    Visit Vitals  /82   Pulse 62   Ht 1.651 m (5' 5\")   Wt 98.4 kg (217 lb)   BMI 36.11 kg/m²   OB Status Postmenopausal   Smoking Status Never   BSA 2.12 m²       Objective   Physical Exam  General: Alert and oriented. Appears well-nourished and in no acute distress.  Eyes: PERRLA. EOMI.  Head/neck: Normocephalic. Supple.  Lymphatics: No cervical lymphadenopathy.  Respiratory/Thorax: Clear to auscultation bilaterally. No wheezing.   Cardiovascular: Regular rate and rhythm.Positive for murmurs.  Gastrointestinal: Soft, nontender, nondistended. +BS   Musculoskeletal: ROM intact. No joint swelling. Normal strength   Extremities: Warm and well perfused. No peripheral edema.  Neurological: No gross neurologic deficits.   Psychological: Appropriate mood and affect.   Skin: No visible rashes or lesions.     Assessment/Plan   She is 74 year old female with PMH of heart block, type 2 DM ,HTN, Hypothyroidism, lupus who presented for follow up    # Lupus  -patient is stable on plaquenil  -She has a follow-up appointment with her eye doctor, due to her Plaquenil  -Order some labs today such as ESR, CRP, double-stranded DNA, C3, C4 complement  -Continue on Plaquenil.  -Advised to make changes on her diet to protect her kidney(since " she is diabetic, and has lupus which could affect that organ).    No red flags. Follow up in 8-month for lupus    Problem List Items Addressed This Visit    None      I have personally reviewed all available pertinent labs, imaging, and consult notes with the patient.     All questions and concerns were addressed. Patient verbalizes understanding instructions and agrees with established plan of care.     I discussed the plan with Dr. Eliot Moran MD  Family medicine resident  PGY2

## 2024-05-13 ENCOUNTER — TELEPHONE (OUTPATIENT)
Dept: PRIMARY CARE | Facility: CLINIC | Age: 74
End: 2024-05-13
Payer: MEDICARE

## 2024-05-13 DIAGNOSIS — E11.9 TYPE 2 DIABETES MELLITUS WITHOUT COMPLICATION, WITHOUT LONG-TERM CURRENT USE OF INSULIN (MULTI): ICD-10-CM

## 2024-05-13 RX ORDER — FLASH GLUCOSE SENSOR
KIT MISCELLANEOUS
Qty: 6 EACH | Refills: 3 | Status: SHIPPED | OUTPATIENT
Start: 2024-05-13

## 2024-05-13 NOTE — TELEPHONE ENCOUNTER
"Pt states she needs her free style shaheed \"the thing that has the needle in it\" there is two on her list. Was not sure which one. Pt was not able to tell me.   "

## 2024-05-22 PROBLEM — Z86.79 HISTORY OF HYPERTENSION: Status: ACTIVE | Noted: 2024-05-22

## 2024-05-22 PROBLEM — Z86.69 HISTORY OF CATARACT: Status: ACTIVE | Noted: 2024-05-22

## 2024-05-22 PROBLEM — Z86.39 HISTORY OF DIABETES MELLITUS: Status: ACTIVE | Noted: 2024-05-22

## 2024-05-22 PROBLEM — R56.9 SEIZURE (MULTI): Status: ACTIVE | Noted: 2024-05-22

## 2024-05-22 PROBLEM — Z86.39 HISTORY OF OBESITY: Status: ACTIVE | Noted: 2024-05-22

## 2024-05-22 PROBLEM — R68.89 ABNORMAL FINDING ON EVALUATION PROCEDURE: Status: ACTIVE | Noted: 2024-05-22

## 2024-05-22 PROBLEM — R79.82 HIGH C-REACTIVE PROTEIN: Status: ACTIVE | Noted: 2022-11-09

## 2024-05-30 ENCOUNTER — OFFICE VISIT (OUTPATIENT)
Dept: PRIMARY CARE | Facility: CLINIC | Age: 74
End: 2024-05-30
Payer: MEDICARE

## 2024-05-30 VITALS
DIASTOLIC BLOOD PRESSURE: 70 MMHG | SYSTOLIC BLOOD PRESSURE: 120 MMHG | HEIGHT: 65 IN | TEMPERATURE: 98 F | WEIGHT: 217.6 LBS | HEART RATE: 60 BPM | BODY MASS INDEX: 36.25 KG/M2 | OXYGEN SATURATION: 97 %

## 2024-05-30 DIAGNOSIS — E11.9 TYPE 2 DIABETES MELLITUS WITHOUT COMPLICATION, WITHOUT LONG-TERM CURRENT USE OF INSULIN (MULTI): Primary | ICD-10-CM

## 2024-05-30 LAB — POC HEMOGLOBIN A1C: 9.6 % (ref 4.2–6.5)

## 2024-05-30 PROCEDURE — 3074F SYST BP LT 130 MM HG: CPT | Performed by: FAMILY MEDICINE

## 2024-05-30 PROCEDURE — 1124F ACP DISCUSS-NO DSCNMKR DOCD: CPT | Performed by: FAMILY MEDICINE

## 2024-05-30 PROCEDURE — 1159F MED LIST DOCD IN RCRD: CPT | Performed by: FAMILY MEDICINE

## 2024-05-30 PROCEDURE — 1170F FXNL STATUS ASSESSED: CPT | Performed by: FAMILY MEDICINE

## 2024-05-30 PROCEDURE — 99214 OFFICE O/P EST MOD 30 MIN: CPT | Performed by: FAMILY MEDICINE

## 2024-05-30 PROCEDURE — 83036 HEMOGLOBIN GLYCOSYLATED A1C: CPT | Performed by: FAMILY MEDICINE

## 2024-05-30 PROCEDURE — 3078F DIAST BP <80 MM HG: CPT | Performed by: FAMILY MEDICINE

## 2024-05-30 PROCEDURE — 1036F TOBACCO NON-USER: CPT | Performed by: FAMILY MEDICINE

## 2024-05-30 PROCEDURE — 4010F ACE/ARB THERAPY RXD/TAKEN: CPT | Performed by: FAMILY MEDICINE

## 2024-05-30 PROCEDURE — 3062F POS MACROALBUMINURIA REV: CPT | Performed by: FAMILY MEDICINE

## 2024-05-30 PROCEDURE — 1126F AMNT PAIN NOTED NONE PRSNT: CPT | Performed by: FAMILY MEDICINE

## 2024-05-30 PROCEDURE — 1157F ADVNC CARE PLAN IN RCRD: CPT | Performed by: FAMILY MEDICINE

## 2024-05-30 ASSESSMENT — ENCOUNTER SYMPTOMS
VOMITING: 0
DYSURIA: 0
SORE THROAT: 0
ABDOMINAL PAIN: 0
HEADACHES: 0
SHORTNESS OF BREATH: 0
COUGH: 0
DIARRHEA: 0
FREQUENCY: 1
FEVER: 0
FATIGUE: 0
CONSTIPATION: 0
NAUSEA: 0
CHILLS: 0

## 2024-05-30 ASSESSMENT — LIFESTYLE VARIABLES: HOW OFTEN DO YOU HAVE A DRINK CONTAINING ALCOHOL: NEVER

## 2024-05-30 ASSESSMENT — ACTIVITIES OF DAILY LIVING (ADL)
MANAGING_FINANCES: INDEPENDENT
BATHING: INDEPENDENT
TAKING_MEDICATION: INDEPENDENT
GROCERY_SHOPPING: INDEPENDENT
DOING_HOUSEWORK: INDEPENDENT
DRESSING: INDEPENDENT

## 2024-05-30 ASSESSMENT — PATIENT HEALTH QUESTIONNAIRE - PHQ9
2. FEELING DOWN, DEPRESSED OR HOPELESS: NOT AT ALL
1. LITTLE INTEREST OR PLEASURE IN DOING THINGS: NOT AT ALL
SUM OF ALL RESPONSES TO PHQ9 QUESTIONS 1 AND 2: 0

## 2024-05-30 ASSESSMENT — PAIN SCALES - GENERAL: PAINLEVEL: 0-NO PAIN

## 2024-05-30 NOTE — PROGRESS NOTES
"Subjective   Patient ID: Yolanda Zabala is a 74 y.o. female who presents for Diabetes.    Diabetes  Pertinent negatives for hypoglycemia include no headaches. Pertinent negatives for diabetes include no chest pain and no fatigue.    here for dm2 follow up-A1c higher, wt stable, tolerated low dose ozempic with no side effects. Has appt with nephrology June 24th.     Review of Systems   Constitutional:  Negative for chills, fatigue and fever.   HENT:  Negative for congestion and sore throat.    Eyes:  Negative for visual disturbance.   Respiratory:  Negative for cough and shortness of breath.    Cardiovascular:  Negative for chest pain.   Gastrointestinal:  Negative for abdominal pain, constipation, diarrhea, nausea and vomiting.   Genitourinary:  Positive for frequency. Negative for dysuria.   Neurological:  Negative for headaches.       Objective   /70   Pulse 60   Temp 36.7 °C (98 °F)   Ht 1.651 m (5' 5\")   Wt 98.7 kg (217 lb 9.6 oz)   SpO2 97%   BMI 36.21 kg/m²     Physical Exam  Vitals reviewed.   Constitutional:       General: She is not in acute distress.     Appearance: Normal appearance.   HENT:      Head: Normocephalic.      Mouth/Throat:      Mouth: Mucous membranes are dry.      Pharynx: Oropharynx is clear.   Eyes:      Extraocular Movements: Extraocular movements intact.      Conjunctiva/sclera: Conjunctivae normal.   Cardiovascular:      Rate and Rhythm: Normal rate and regular rhythm.      Pulses: Normal pulses.      Heart sounds: No murmur heard.  Pulmonary:      Effort: Pulmonary effort is normal.      Breath sounds: Normal breath sounds. No wheezing or rhonchi.   Abdominal:      General: Bowel sounds are normal. There is no distension.      Palpations: Abdomen is soft.      Tenderness: There is no abdominal tenderness. There is no rebound.   Musculoskeletal:      Right lower leg: No edema.      Left lower leg: No edema.   Skin:     General: Skin is warm.      Coloration: Skin is not " jaundiced.   Neurological:      General: No focal deficit present.      Mental Status: She is alert and oriented to person, place, and time.      Cranial Nerves: No cranial nerve deficit.      Gait: Gait normal.   Psychiatric:         Mood and Affect: Mood normal.         Assessment/Plan   Problem List Items Addressed This Visit             ICD-10-CM    Type 2 diabetes mellitus without complication, without long-term current use of insulin (Multi) - Primary E11.9    Relevant Orders    POCT glycosylated hemoglobin (Hb A1C) manually resulted (Completed)     To start 1mg ozempic when supply of 0.5mg weekly runs out

## 2024-06-05 DIAGNOSIS — E11.9 TYPE 2 DIABETES MELLITUS WITHOUT COMPLICATION, WITHOUT LONG-TERM CURRENT USE OF INSULIN (MULTI): ICD-10-CM

## 2024-06-05 DIAGNOSIS — I10 BENIGN ESSENTIAL HTN: ICD-10-CM

## 2024-06-18 ENCOUNTER — LAB (OUTPATIENT)
Dept: LAB | Facility: LAB | Age: 74
End: 2024-06-18
Payer: MEDICARE

## 2024-06-18 DIAGNOSIS — I10 BENIGN ESSENTIAL HTN: ICD-10-CM

## 2024-06-18 DIAGNOSIS — E11.9 TYPE 2 DIABETES MELLITUS WITHOUT COMPLICATION, WITHOUT LONG-TERM CURRENT USE OF INSULIN (MULTI): ICD-10-CM

## 2024-06-18 LAB
ALBUMIN SERPL BCP-MCNC: 4.4 G/DL (ref 3.4–5)
ANION GAP SERPL CALC-SCNC: 18 MMOL/L (ref 10–20)
APPEARANCE UR: CLEAR
BILIRUB UR STRIP.AUTO-MCNC: NEGATIVE MG/DL
BUN SERPL-MCNC: 34 MG/DL (ref 6–23)
CALCIUM SERPL-MCNC: 10.6 MG/DL (ref 8.6–10.3)
CHLORIDE SERPL-SCNC: 100 MMOL/L (ref 98–107)
CO2 SERPL-SCNC: 26 MMOL/L (ref 21–32)
COLOR UR: ABNORMAL
CREAT SERPL-MCNC: 1.01 MG/DL (ref 0.5–1.05)
CREAT UR-MCNC: 50.4 MG/DL (ref 20–320)
EGFRCR SERPLBLD CKD-EPI 2021: 59 ML/MIN/1.73M*2
GLUCOSE SERPL-MCNC: 273 MG/DL (ref 74–99)
GLUCOSE UR STRIP.AUTO-MCNC: ABNORMAL MG/DL
KETONES UR STRIP.AUTO-MCNC: NEGATIVE MG/DL
LEUKOCYTE ESTERASE UR QL STRIP.AUTO: ABNORMAL
MAGNESIUM SERPL-MCNC: 1.66 MG/DL (ref 1.6–2.4)
MICROALBUMIN UR-MCNC: 193.4 MG/L
MICROALBUMIN/CREAT UR: 383.7 UG/MG CREAT
NITRITE UR QL STRIP.AUTO: NEGATIVE
PH UR STRIP.AUTO: 5.5 [PH]
PHOSPHATE SERPL-MCNC: 4 MG/DL (ref 2.5–4.9)
POTASSIUM SERPL-SCNC: 4.7 MMOL/L (ref 3.5–5.3)
PROT UR STRIP.AUTO-MCNC: ABNORMAL MG/DL
PTH-INTACT SERPL-MCNC: 36.2 PG/ML (ref 18.5–88)
RBC # UR STRIP.AUTO: NEGATIVE /UL
RBC #/AREA URNS AUTO: ABNORMAL /HPF
SODIUM SERPL-SCNC: 139 MMOL/L (ref 136–145)
SP GR UR STRIP.AUTO: 1.03
SQUAMOUS #/AREA URNS AUTO: ABNORMAL /HPF
URATE SERPL-MCNC: 4.5 MG/DL (ref 2.3–6.7)
UROBILINOGEN UR STRIP.AUTO-MCNC: NORMAL MG/DL
WBC #/AREA URNS AUTO: ABNORMAL /HPF

## 2024-06-18 PROCEDURE — 36415 COLL VENOUS BLD VENIPUNCTURE: CPT

## 2024-06-18 PROCEDURE — 83735 ASSAY OF MAGNESIUM: CPT

## 2024-06-18 PROCEDURE — 83970 ASSAY OF PARATHORMONE: CPT

## 2024-06-18 PROCEDURE — 81001 URINALYSIS AUTO W/SCOPE: CPT

## 2024-06-18 PROCEDURE — 82570 ASSAY OF URINE CREATININE: CPT

## 2024-06-18 PROCEDURE — 82043 UR ALBUMIN QUANTITATIVE: CPT

## 2024-06-18 PROCEDURE — 80069 RENAL FUNCTION PANEL: CPT

## 2024-06-18 PROCEDURE — 84550 ASSAY OF BLOOD/URIC ACID: CPT

## 2024-06-24 ENCOUNTER — APPOINTMENT (OUTPATIENT)
Dept: NEPHROLOGY | Facility: CLINIC | Age: 74
End: 2024-06-24
Payer: MEDICARE

## 2024-06-24 VITALS
BODY MASS INDEX: 36.43 KG/M2 | HEIGHT: 64 IN | HEART RATE: 60 BPM | DIASTOLIC BLOOD PRESSURE: 71 MMHG | TEMPERATURE: 97.5 F | WEIGHT: 213.4 LBS | RESPIRATION RATE: 16 BRPM | OXYGEN SATURATION: 95 % | SYSTOLIC BLOOD PRESSURE: 105 MMHG

## 2024-06-24 DIAGNOSIS — E11.9 TYPE 2 DIABETES MELLITUS WITHOUT COMPLICATION, WITHOUT LONG-TERM CURRENT USE OF INSULIN (MULTI): ICD-10-CM

## 2024-06-24 DIAGNOSIS — I10 BENIGN ESSENTIAL HTN: ICD-10-CM

## 2024-06-24 DIAGNOSIS — N06.0 ISOLATED PROTEINURIA WITH MINOR GLOMERULAR ABNORMALITY: ICD-10-CM

## 2024-06-24 DIAGNOSIS — N18.31 STAGE 3A CHRONIC KIDNEY DISEASE (MULTI): Primary | ICD-10-CM

## 2024-06-24 PROCEDURE — 1157F ADVNC CARE PLAN IN RCRD: CPT | Performed by: INTERNAL MEDICINE

## 2024-06-24 PROCEDURE — 3060F POS MICROALBUMINURIA REV: CPT | Performed by: INTERNAL MEDICINE

## 2024-06-24 PROCEDURE — 3074F SYST BP LT 130 MM HG: CPT | Performed by: INTERNAL MEDICINE

## 2024-06-24 PROCEDURE — 99214 OFFICE O/P EST MOD 30 MIN: CPT | Performed by: INTERNAL MEDICINE

## 2024-06-24 PROCEDURE — 4010F ACE/ARB THERAPY RXD/TAKEN: CPT | Performed by: INTERNAL MEDICINE

## 2024-06-24 PROCEDURE — 3078F DIAST BP <80 MM HG: CPT | Performed by: INTERNAL MEDICINE

## 2024-06-24 PROCEDURE — 1159F MED LIST DOCD IN RCRD: CPT | Performed by: INTERNAL MEDICINE

## 2024-06-24 NOTE — PROGRESS NOTES
Subjective       Yolanda Zabala is a 73 y.o. female who has past medical history of hypertension, recurrent gout, hypothyroidism, systemic lupus erythematosus, type 2 diabetes was coming to see me today for proteinuric CKD follow-up     Last office visit January 2024. Overall patient is doing well with some improved renal function and proteinuria since starting Farxiga. Calcium was elevated on recent labs, but patient reports drinking adequate water. Her hyperkalemia has resolved at this time. Blood pressure within normal limits. Physical exam unremarkable for any lower extremity edema. Patient is accompanied alone and has no further concerns from a renal stand-point.      Prior notes   Last office visit July 2023.  We discussed conservative measures.  We discussed healthier diet.  Today, Yolanda came alone.  She reports trying to be on healthier diet.  She is trying to avoid processed meat as possible.  She increased fresh fruits and vegetables.  She increase Farxiga 10 mg as instructed.  She reports that she is very low income and healthier food options are not available easily.  She denies any problems getting her medications-all are fully covered including Farxiga.  Repeat blood work showed stable serum creatinine 0.9 and GFR 60.  Potassium is borderline high 5.3.  Albuminuria is still significant 565 mg/g.  No leg swelling or shortness of breath.  She reports muscle pain especially around the axilla.     Prior notes  Yolanda came alone today. She reports long history of systemic lupus. She has been on hydroxychloroquine for some time. She gets her eye checked annually. She saw nephrology in the past and that she reports undergoing kidney biopsy-she cannot remember what wild the results at that time. She denies lower urine tract symptoms. She has uncontrolled diabetes with A1c 12.3%. She does not endorse signs of lupus activities. She continues to follow with rheumatology for lupus     Social history: Non-smoker,  "unemployed  Family history: Irrelevant  Surgical history: Irrelevant       Objective   /71 (BP Location: Right arm, Patient Position: Standing, BP Cuff Size: Large adult)   Pulse 60   Temp 36.4 °C (97.5 °F)   Resp 16   Ht 1.626 m (5' 4\")   Wt 96.8 kg (213 lb 6.4 oz)   SpO2 95%   BMI 36.63 kg/m²   Wt Readings from Last 3 Encounters:   06/24/24 96.8 kg (213 lb 6.4 oz)   05/30/24 98.7 kg (217 lb 9.6 oz)   02/26/24 98.4 kg (217 lb)       Physical Exam    General appearance: no distress awake and alert on room air, euvolemic on exam  Eyes: non-icteric  HEENT: atrumatic head, PEERLA, moist mucosa  Skin: no apparent rash  Heart: NSR, S1, S2 normal, no murmur or gallop  Lungs: Symmetrical expansion,CTA bilat no wheezing/crackles  Abdomen: soft, nt/nd, obese  Extremities: no edema bilat  Neuro: No FND,asterixis, no focal deficits noticed        Review of Systems     Constitutional: no fever, no chills, no recent weight gain and no recent weight loss.   Eyes: no blurred vision and no diplopia.   ENT: no hearing loss, no earache, no sore throat, no swollen glands in the neck and no nasal discharge.   Cardiovascular: no chest pain, no palpitations and no lower extremity edema.   Respiratory: no shortness of breath, no chronic cough and no shortness of breath during exertion.   Gastrointestinal: no abdominal pain, no constipation, no heartburn, no vomiting, no bloody stools and no change in bowel movements.   Genitourinary: no dysuria and no hematuria.   Musculoskeletal: no arthralgias and no myalgias.   Skin: no rashes and no skin lesions.   Neurological: no headaches and no dizziness.   Psychiatric: no confusion, no depression and no anxiety.   Endocrine: no heat intolerance, no cold intolerance, appetite not increased, no thyroid disorder, no increased urinary frequency and no dry skin.   Hematologic/Lymphatic: does not bleed easily and does not bruise easily.   All other systems have been reviewed and are " negative for complaint.         Data Review      Lab Results   Component Value Date    URICACID 4.5 06/18/2024           Lab Results   Component Value Date    HGBA1C 9.6 (A) 05/30/2024           Results from last 7 days   Lab Units 06/18/24  1058   SODIUM mmol/L 139   POTASSIUM mmol/L 4.7   CHLORIDE mmol/L 100   CO2 mmol/L 26   BUN mg/dL 34*   GLUCOSE mg/dL 273*   CALCIUM mg/dL 10.6*   ANION GAP mmol/L 18   EGFR mL/min/1.73m*2 59*           Albumin/Creatine Ratio   Date Value Ref Range Status   06/18/2024 383.7 (H) <30.0 ug/mg Creat Final   01/08/2024 565.8 (H) <30.0 ug/mg Creat Final   11/30/2023 119.2 (H) <30.0 ug/mg Creat Final            RFP  Recent Labs     06/18/24  1058 01/08/24  1118 11/30/23  1056 08/11/23  1440 04/19/23  1211 02/27/23  1103 12/05/22  1415 10/27/20  1300 09/25/20  1228 09/22/20  1236 09/19/20  1807 09/19/20  1557    141 138 139 137 137 137   < > 141   < > 142 143   K 4.7 5.3 4.5 4.3 5.0 5.1 5.2   < > 5.2   < > 4.8 5.2*    102 103 101 98 101 106   < > 106   < > 109* 108*   CO2 26 27 23 25 26 25 20*   < > 27   < > 19* 20*   BUN 34* 26* 40* 27* 22 33* 32*   < > 35*   < > 41* 43*   CREATININE 1.01 0.99 1.00 0.97 1.01 0.97 0.91   < > 0.93   < > 1.0 1.0   GLUCOSE 273* 181* 139* 140* 406* 166* 125*   < > 95   < > 59* 80   CALCIUM 10.6* 10.1 9.7 9.8 9.6 10.6* 10.6*   < > 9.9   < > 9.5 10.3   PHOS 4.0 4.0  --   --   --   --   --   --  3.7  --   --   --    EGFR 59* 61 60*  --   --   --   --   --   --   --  59 59   ANIONGAP 18 17 17 17 18 16 16   < > 13   < > 14 15    < > = values in this interval not displayed.        Urineanalysis  Recent Labs     06/18/24  1144 01/08/24  1118 02/27/23  1103 11/03/21  1318 03/22/21  0712 09/25/20  1459 09/19/20  1755 11/08/18  1549 11/06/17  1554   COLORU Light-Yellow Yellow  --   --  YELLOW YELLOW YELLOW YELLOW  --    APPEARANCEU Clear Clear  --   --  CLEAR CLEAR CLEAR HAZY  --    SPECGRAVU 1.031 1.021  --   --  1.015 1.015 1.026 1.020  --    PEEWEE 5.5  6.0  --   --  5.0 5.0 5.5 5.0  --    PROTUR 20 (TRACE) 100 (2+)* 59* 75* 100(2+)* NEGATIVE 30* 100(2+)* 46*   GLUCOSEU OVER (4+)* >=500 (3+)*  --   --  NEGATIVE NEGATIVE NEGATIVE NEGATIVE  --    BLOODU NEGATIVE NEGATIVE  --   --  NEGATIVE NEGATIVE NEGATIVE NEGATIVE  --    KETONESU NEGATIVE NEGATIVE  --   --  NEGATIVE NEGATIVE NEGATIVE NEGATIVE  --    BILIRUBINU NEGATIVE NEGATIVE  --   --  NEGATIVE NEGATIVE NEGATIVE NEGATIVE  --    NITRITEU NEGATIVE NEGATIVE  --   --  NEGATIVE NEGATIVE NEGATIVE POSITIVE*  --    LEUKOCYTESU 25 Madalyn/µL* SMALL (1+)*  --   --  NEGATIVE NEGATIVE SMALL* NEGATIVE  --        Urine Electrolytes  Recent Labs     06/18/24  1144 01/08/24  1118 11/30/23  1056 02/27/23  1103 08/08/22  0912 11/03/21  1318 07/27/21  1152 03/22/21  0712 09/25/20  1459 09/19/20  1755 11/08/18  1549 11/06/17  1554   CREATU 50.4 58.4  54.9 91.2 92.4 81.1 81.1 62.1  --   --   --   --  90.0   PROTUR 20 (TRACE) 100 (2+)*  --  59*  --  75*  --  100(2+)* NEGATIVE 30* 100(2+)* 46*   UTPCR  --  0.95*  --  0.64*  --  0.92*  --   --   --   --   --  0.51   ALBUMINUR 193.4 330.4 108.7  --   --   --   --   --   --   --   --   --    MICROALBCREA 383.7* 565.8* 119.2*  --  154.0*  --  162.0*  --   --   --   --   --         Urine Micro  Recent Labs     06/18/24  1144 01/08/24  1118 03/22/21  0712 09/19/20  1755 11/08/18  1549 11/06/17  1554   WBCU 6-10* 1-5 3 6* 5-20* 5-20*   RBCU 1-2 NONE 1 1 0-5 0-5   HYALCASTU  --   --   --  3  --   --    SQUAMEPIU 1-9 (SPARSE) 1-9 (SPARSE) <1 FEW 2+ 1+   BACTERIAU  --  1+*  --  POSITIVE 4+* 4+*   MUCUSU  --  FEW FEW  --   --   --         Iron  Recent Labs     08/11/23  1440   IRON 56   TIBC 420   IRONSAT 13*   FERRITIN 333*          Current Outpatient Medications on File Prior to Visit   Medication Sig Dispense Refill    cetirizine (ZyrTEC) 10 mg tablet Take 1 tablet (10 mg) by mouth once daily. 30 tablet 11    dapagliflozin propanediol (Farxiga) 10 mg Take two  5 mg tablets orally daily 30 tablet  11    flash glucose sensor kit (FreeStyle Mikhail 2 Sensor) kit Use as instructed 6 each 3    FreeStyle Mikhail reader (FreeStyle Mikhail 2 [place]) misc Use as instructed 1 each 0    hydroxychloroquine (Plaquenil) 200 mg tablet Take 1 tablet (200 mg) by mouth once daily. 30 tablet 11    latanoprost (Xalatan) 0.005 % ophthalmic solution INSTILL 1 DROP INTO EACH EYE ONCE DAILY AT NIGHT      levothyroxine (Synthroid, Levoxyl) 100 mcg tablet Take 1 tablet (100 mcg) by mouth once daily. 90 tablet 3    losartan (Cozaar) 50 mg tablet Take 1 tablet (50 mg) by mouth 2 times a day. 180 tablet 3    metoprolol tartrate (Lopressor) 100 mg tablet Take 0.5 tablets (50 mg) by mouth 2 times a day. 180 tablet 3    semaglutide (OZEMPIC) 1 mg/dose (4 mg/3 mL) pen injector Inject 1 mg under the skin 1 (one) time per week. 3 mL 11    albuterol 90 mcg/actuation inhaler Inhale 2 puffs every 6 hours if needed for wheezing. (Patient not taking: Reported on 2024) 18 g 3    [] allopurinol (Zyloprim) 100 mg tablet Take 3 tablets (300 mg) by mouth once daily. 270 tablet 3    atorvastatin (Lipitor) 20 mg tablet Take 1 tablet (20 mg) by mouth once daily. 90 tablet 3    blood sugar diagnostic strip Please prescribe strips compatible with Relion Premier Classic (Patient not taking: Reported on 2024) 100 strip 3    metFORMIN (Glucophage) 1,000 mg tablet Take 1 tablet (1,000 mg) by mouth 2 times a day with meals. Take with morning and evening meal. 180 tablet 3     No current facility-administered medications on file prior to visit.       Assessment and Plan       Yolanda Zabala  is a 73 y.o. 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 Less likely lupus nephritis in the absence of hematuria and with negative lupus markers.      Impression  #nephrotic range  proteinuria with improvement  -Serum creatinine 1-1.1 and GFR around 59 both improved from prior   -most recent Urine dipstick earlier is bland with no blood or albumin, significant 4+ glucose in the urine  -ACR is improved 500-->300  -calcium is 10.6; encouraged patient to improve hydration  -continue jardiance and cozaar      #Type 2 diabetes-uncontrolled  -Hemoglobin A1c  9.6%; needs tighter control  -on ozempic and jardiance      #SLE  -Within normal C3-C4, negative anti-double-stranded DNA  -On hydroxychloroquine  -Instructed against NSAID use     #No significant anemia         Follow-up in 6 months with labs    Peyman Arias DO  PGY-1  Family Medicine

## 2024-07-18 ENCOUNTER — TELEPHONE (OUTPATIENT)
Dept: PRIMARY CARE | Facility: CLINIC | Age: 74
End: 2024-07-18
Payer: MEDICARE

## 2024-07-18 DIAGNOSIS — B37.31 VAGINAL YEAST INFECTION: Primary | ICD-10-CM

## 2024-07-18 RX ORDER — FLUCONAZOLE 150 MG/1
150 TABLET ORAL ONCE
Qty: 1 TABLET | Refills: 0 | Status: SHIPPED | OUTPATIENT
Start: 2024-07-18 | End: 2024-07-18

## 2024-07-18 NOTE — TELEPHONE ENCOUNTER
Lov: 2024 upcomin2024 Pt called office and stated that she has been having itching, soreness, burning and frequency. Someone she knows told her to use vagisil but pt states this did not work for her. She is wondering if something can be called in for her? [facility]

## 2024-07-18 NOTE — TELEPHONE ENCOUNTER
Call to pt made. I let her know medication was called in for her. She stated understanding and had no further questions.

## 2024-07-19 DIAGNOSIS — E11.9 TYPE 2 DIABETES MELLITUS WITHOUT COMPLICATION, WITHOUT LONG-TERM CURRENT USE OF INSULIN (MULTI): Primary | ICD-10-CM

## 2024-07-19 RX ORDER — METFORMIN HYDROCHLORIDE 1000 MG/1
1000 TABLET ORAL
Qty: 180 TABLET | Refills: 3 | Status: SHIPPED | OUTPATIENT
Start: 2024-07-19 | End: 2025-07-19

## 2024-07-22 ENCOUNTER — APPOINTMENT (OUTPATIENT)
Dept: NEPHROLOGY | Facility: CLINIC | Age: 74
End: 2024-07-22
Payer: MEDICARE

## 2024-07-25 DIAGNOSIS — E78.5 HYPERLIPIDEMIA, UNSPECIFIED HYPERLIPIDEMIA TYPE: ICD-10-CM

## 2024-07-26 DIAGNOSIS — I10 BENIGN ESSENTIAL HTN: ICD-10-CM

## 2024-08-02 ENCOUNTER — TELEPHONE (OUTPATIENT)
Dept: PRIMARY CARE | Facility: CLINIC | Age: 74
End: 2024-08-02
Payer: MEDICARE

## 2024-08-02 DIAGNOSIS — B37.31 VAGINAL YEAST INFECTION: Primary | ICD-10-CM

## 2024-08-02 DIAGNOSIS — I10 BENIGN ESSENTIAL HTN: Primary | ICD-10-CM

## 2024-08-02 RX ORDER — METOPROLOL TARTRATE 100 MG/1
TABLET ORAL 2 TIMES DAILY
Qty: 180 TABLET | Refills: 0 | OUTPATIENT
Start: 2024-08-02

## 2024-08-02 RX ORDER — FLUCONAZOLE 150 MG/1
150 TABLET ORAL ONCE
Qty: 1 TABLET | Refills: 0 | Status: SHIPPED | OUTPATIENT
Start: 2024-08-02 | End: 2024-08-02

## 2024-08-02 RX ORDER — METOPROLOL TARTRATE 100 MG/1
50 TABLET ORAL 2 TIMES DAILY
Qty: 180 TABLET | Refills: 3 | Status: SHIPPED | OUTPATIENT
Start: 2024-08-02

## 2024-08-02 NOTE — TELEPHONE ENCOUNTER
Patient called office back stating she had an alert the Metoprolol prescription was called in but wondering if you could call in something for the yeast infection symptoms too so she could pick both up together as she has to get transportation to take her to the pharmacy.

## 2024-08-02 NOTE — TELEPHONE ENCOUNTER
LOV: 2024 UPCOMIN2024 PT NEEDS MED REFILL. SHE ALSO STATED THAT HER YEAST INFECTION CAME BACK. SHE STATES SHE IS ITCHY AND TENDER. WONDERING IF SHE CAN HAVE SOMETHING CALLED IN FOR THAT TOO? [facility]

## 2024-08-02 NOTE — TELEPHONE ENCOUNTER
Call to patient made. Let her know the medication she was requesting was called in. She verbalized understanding and had no further questions at this time.

## 2024-08-09 DIAGNOSIS — E78.5 HYPERLIPIDEMIA, UNSPECIFIED HYPERLIPIDEMIA TYPE: Primary | ICD-10-CM

## 2024-08-09 RX ORDER — ATORVASTATIN CALCIUM 20 MG/1
20 TABLET, FILM COATED ORAL DAILY
Qty: 90 TABLET | Refills: 0 | OUTPATIENT
Start: 2024-08-09

## 2024-08-09 RX ORDER — ATORVASTATIN CALCIUM 20 MG/1
20 TABLET, FILM COATED ORAL DAILY
Qty: 90 TABLET | Refills: 3 | Status: SHIPPED | OUTPATIENT
Start: 2024-08-09 | End: 2025-08-09

## 2024-09-03 ENCOUNTER — APPOINTMENT (OUTPATIENT)
Dept: PRIMARY CARE | Facility: CLINIC | Age: 74
End: 2024-09-03
Payer: MEDICARE

## 2024-09-24 ENCOUNTER — APPOINTMENT (OUTPATIENT)
Dept: PRIMARY CARE | Facility: CLINIC | Age: 74
End: 2024-09-24
Payer: MEDICARE

## 2024-09-24 VITALS
TEMPERATURE: 98 F | DIASTOLIC BLOOD PRESSURE: 68 MMHG | HEIGHT: 64 IN | BODY MASS INDEX: 35.1 KG/M2 | OXYGEN SATURATION: 97 % | SYSTOLIC BLOOD PRESSURE: 122 MMHG | HEART RATE: 64 BPM | WEIGHT: 205.6 LBS

## 2024-09-24 DIAGNOSIS — I10 BENIGN ESSENTIAL HTN: ICD-10-CM

## 2024-09-24 DIAGNOSIS — R56.9 SEIZURE (MULTI): ICD-10-CM

## 2024-09-24 DIAGNOSIS — E78.2 MIXED HYPERLIPIDEMIA: ICD-10-CM

## 2024-09-24 DIAGNOSIS — E03.9 HYPOTHYROIDISM, ADULT: ICD-10-CM

## 2024-09-24 DIAGNOSIS — M32.9 LUPUS: ICD-10-CM

## 2024-09-24 DIAGNOSIS — E66.01 OBESITY, MORBID (MULTI): ICD-10-CM

## 2024-09-24 DIAGNOSIS — R07.2 PRECORDIAL PAIN: ICD-10-CM

## 2024-09-24 DIAGNOSIS — J98.4 RESTRICTIVE LUNG DISEASE: ICD-10-CM

## 2024-09-24 DIAGNOSIS — J84.10 PULMONARY FIBROSIS, UNSPECIFIED (MULTI): ICD-10-CM

## 2024-09-24 DIAGNOSIS — Z00.00 ROUTINE GENERAL MEDICAL EXAMINATION AT HEALTH CARE FACILITY: ICD-10-CM

## 2024-09-24 DIAGNOSIS — E11.9 TYPE 2 DIABETES MELLITUS WITHOUT COMPLICATION, WITHOUT LONG-TERM CURRENT USE OF INSULIN (MULTI): Primary | ICD-10-CM

## 2024-09-24 PROBLEM — B96.89 ACUTE BACTERIAL SINUSITIS: Status: RESOLVED | Noted: 2023-11-08 | Resolved: 2024-09-24

## 2024-09-24 PROBLEM — J31.0 RHINITIS: Status: RESOLVED | Noted: 2023-11-08 | Resolved: 2024-09-24

## 2024-09-24 PROBLEM — Z86.39 HISTORY OF OBESITY: Status: RESOLVED | Noted: 2024-05-22 | Resolved: 2024-09-24

## 2024-09-24 PROBLEM — R68.89 ABNORMAL FINDING ON EVALUATION PROCEDURE: Status: RESOLVED | Noted: 2024-05-22 | Resolved: 2024-09-24

## 2024-09-24 PROBLEM — H10.10 ALLERGIC CONJUNCTIVITIS: Status: RESOLVED | Noted: 2023-04-18 | Resolved: 2024-09-24

## 2024-09-24 PROBLEM — Z86.79 HISTORY OF HYPERTENSION: Status: RESOLVED | Noted: 2024-05-22 | Resolved: 2024-09-24

## 2024-09-24 PROBLEM — Z86.39 HISTORY OF DIABETES MELLITUS: Status: RESOLVED | Noted: 2024-05-22 | Resolved: 2024-09-24

## 2024-09-24 PROBLEM — J01.90 ACUTE BACTERIAL SINUSITIS: Status: RESOLVED | Noted: 2023-11-08 | Resolved: 2024-09-24

## 2024-09-24 LAB
ALBUMIN SERPL BCP-MCNC: 4.1 G/DL (ref 3.4–5)
ALP SERPL-CCNC: 109 U/L (ref 33–136)
ALT SERPL W P-5'-P-CCNC: 43 U/L (ref 7–45)
ANION GAP SERPL CALC-SCNC: 18 MMOL/L (ref 10–20)
AST SERPL W P-5'-P-CCNC: 43 U/L (ref 9–39)
BASOPHILS # BLD AUTO: 0.05 X10*3/UL (ref 0–0.1)
BASOPHILS NFR BLD AUTO: 0.6 %
BILIRUB SERPL-MCNC: 0.7 MG/DL (ref 0–1.2)
BUN SERPL-MCNC: 30 MG/DL (ref 6–23)
CALCIUM SERPL-MCNC: 10.1 MG/DL (ref 8.6–10.3)
CHLORIDE SERPL-SCNC: 104 MMOL/L (ref 98–107)
CHOLEST SERPL-MCNC: 119 MG/DL (ref 0–199)
CHOLESTEROL/HDL RATIO: 3.7
CO2 SERPL-SCNC: 22 MMOL/L (ref 21–32)
CREAT SERPL-MCNC: 0.67 MG/DL (ref 0.5–1.05)
CRP SERPL-MCNC: 0.39 MG/DL
EGFRCR SERPLBLD CKD-EPI 2021: >90 ML/MIN/1.73M*2
EOSINOPHIL # BLD AUTO: 0.3 X10*3/UL (ref 0–0.4)
EOSINOPHIL NFR BLD AUTO: 3.8 %
ERYTHROCYTE [DISTWIDTH] IN BLOOD BY AUTOMATED COUNT: 14.4 % (ref 11.5–14.5)
ERYTHROCYTE [SEDIMENTATION RATE] IN BLOOD BY WESTERGREN METHOD: 48 MM/H (ref 0–30)
GLUCOSE SERPL-MCNC: 122 MG/DL (ref 74–99)
HCT VFR BLD AUTO: 39.9 % (ref 36–46)
HDLC SERPL-MCNC: 31.8 MG/DL
HGB BLD-MCNC: 13 G/DL (ref 12–16)
IMM GRANULOCYTES # BLD AUTO: 0.03 X10*3/UL (ref 0–0.5)
IMM GRANULOCYTES NFR BLD AUTO: 0.4 % (ref 0–0.9)
LDLC SERPL CALC-MCNC: 21 MG/DL
LYMPHOCYTES # BLD AUTO: 2.42 X10*3/UL (ref 0.8–3)
LYMPHOCYTES NFR BLD AUTO: 30.9 %
MAGNESIUM SERPL-MCNC: 1.56 MG/DL (ref 1.6–2.4)
MCH RBC QN AUTO: 30.4 PG (ref 26–34)
MCHC RBC AUTO-ENTMCNC: 32.6 G/DL (ref 32–36)
MCV RBC AUTO: 93 FL (ref 80–100)
MONOCYTES # BLD AUTO: 0.58 X10*3/UL (ref 0.05–0.8)
MONOCYTES NFR BLD AUTO: 7.4 %
NEUTROPHILS # BLD AUTO: 4.46 X10*3/UL (ref 1.6–5.5)
NEUTROPHILS NFR BLD AUTO: 56.9 %
NON HDL CHOLESTEROL: 87 MG/DL (ref 0–149)
NRBC BLD-RTO: 0 /100 WBCS (ref 0–0)
PLATELET # BLD AUTO: 210 X10*3/UL (ref 150–450)
POC HEMOGLOBIN A1C: 12.4 % (ref 4.2–6.5)
POTASSIUM SERPL-SCNC: 4.9 MMOL/L (ref 3.5–5.3)
PROT SERPL-MCNC: 7.2 G/DL (ref 6.4–8.2)
RBC # BLD AUTO: 4.27 X10*6/UL (ref 4–5.2)
SODIUM SERPL-SCNC: 139 MMOL/L (ref 136–145)
T4 FREE SERPL-MCNC: 1.14 NG/DL (ref 0.61–1.12)
TRIGL SERPL-MCNC: 331 MG/DL (ref 0–149)
TSH SERPL-ACNC: 2.21 MIU/L (ref 0.44–3.98)
VLDL: 66 MG/DL (ref 0–40)
WBC # BLD AUTO: 7.8 X10*3/UL (ref 4.4–11.3)

## 2024-09-24 PROCEDURE — 3078F DIAST BP <80 MM HG: CPT | Performed by: FAMILY MEDICINE

## 2024-09-24 PROCEDURE — 3074F SYST BP LT 130 MM HG: CPT | Performed by: FAMILY MEDICINE

## 2024-09-24 PROCEDURE — 1170F FXNL STATUS ASSESSED: CPT | Performed by: FAMILY MEDICINE

## 2024-09-24 PROCEDURE — 86160 COMPLEMENT ANTIGEN: CPT

## 2024-09-24 PROCEDURE — 80053 COMPREHEN METABOLIC PANEL: CPT

## 2024-09-24 PROCEDURE — 1036F TOBACCO NON-USER: CPT | Performed by: FAMILY MEDICINE

## 2024-09-24 PROCEDURE — 83735 ASSAY OF MAGNESIUM: CPT

## 2024-09-24 PROCEDURE — 86225 DNA ANTIBODY NATIVE: CPT

## 2024-09-24 PROCEDURE — 83036 HEMOGLOBIN GLYCOSYLATED A1C: CPT | Performed by: FAMILY MEDICINE

## 2024-09-24 PROCEDURE — 84439 ASSAY OF FREE THYROXINE: CPT

## 2024-09-24 PROCEDURE — 1124F ACP DISCUSS-NO DSCNMKR DOCD: CPT | Performed by: FAMILY MEDICINE

## 2024-09-24 PROCEDURE — G0439 PPPS, SUBSEQ VISIT: HCPCS | Performed by: FAMILY MEDICINE

## 2024-09-24 PROCEDURE — 84443 ASSAY THYROID STIM HORMONE: CPT

## 2024-09-24 PROCEDURE — 90662 IIV NO PRSV INCREASED AG IM: CPT | Performed by: FAMILY MEDICINE

## 2024-09-24 PROCEDURE — 85652 RBC SED RATE AUTOMATED: CPT

## 2024-09-24 PROCEDURE — 4010F ACE/ARB THERAPY RXD/TAKEN: CPT | Performed by: FAMILY MEDICINE

## 2024-09-24 PROCEDURE — 99214 OFFICE O/P EST MOD 30 MIN: CPT | Performed by: FAMILY MEDICINE

## 2024-09-24 PROCEDURE — 1159F MED LIST DOCD IN RCRD: CPT | Performed by: FAMILY MEDICINE

## 2024-09-24 PROCEDURE — 80061 LIPID PANEL: CPT

## 2024-09-24 PROCEDURE — 3008F BODY MASS INDEX DOCD: CPT | Performed by: FAMILY MEDICINE

## 2024-09-24 PROCEDURE — 86140 C-REACTIVE PROTEIN: CPT

## 2024-09-24 PROCEDURE — G0008 ADMIN INFLUENZA VIRUS VAC: HCPCS | Performed by: FAMILY MEDICINE

## 2024-09-24 PROCEDURE — 3060F POS MICROALBUMINURIA REV: CPT | Performed by: FAMILY MEDICINE

## 2024-09-24 PROCEDURE — 85025 COMPLETE CBC W/AUTO DIFF WBC: CPT

## 2024-09-24 PROCEDURE — 1126F AMNT PAIN NOTED NONE PRSNT: CPT | Performed by: FAMILY MEDICINE

## 2024-09-24 PROCEDURE — 1157F ADVNC CARE PLAN IN RCRD: CPT | Performed by: FAMILY MEDICINE

## 2024-09-24 ASSESSMENT — ACTIVITIES OF DAILY LIVING (ADL)
BATHING: INDEPENDENT
TAKING_MEDICATION: INDEPENDENT
DOING_HOUSEWORK: INDEPENDENT
GROCERY_SHOPPING: INDEPENDENT
MANAGING_FINANCES: INDEPENDENT
DRESSING: INDEPENDENT

## 2024-09-24 ASSESSMENT — ENCOUNTER SYMPTOMS
DYSURIA: 0
HEADACHES: 0
COUGH: 0
FEVER: 0
CHILLS: 0
NAUSEA: 0
VOMITING: 0
FREQUENCY: 0
FATIGUE: 0
SHORTNESS OF BREATH: 0
ABDOMINAL PAIN: 0
DIARRHEA: 0
CONSTIPATION: 0
SORE THROAT: 0

## 2024-09-24 ASSESSMENT — PAIN SCALES - GENERAL: PAINLEVEL: 0-NO PAIN

## 2024-09-24 ASSESSMENT — LIFESTYLE VARIABLES: HOW OFTEN DO YOU HAVE A DRINK CONTAINING ALCOHOL: NEVER

## 2024-09-24 NOTE — ASSESSMENT & PLAN NOTE
Orders:    POCT glycosylated hemoglobin (Hb A1C) manually resulted    semaglutide 2 mg/dose (8 mg/3 mL) pen injector; Inject 2 mg under the skin 1 (one) time per week.

## 2024-09-24 NOTE — ASSESSMENT & PLAN NOTE
Orders:    Sedimentation Rate; Future    C-reactive protein; Future    Anti-DNA Antibody, Double-Stranded; Future    C3 Complement; Future    C4 Complement; Future

## 2024-09-24 NOTE — PROGRESS NOTES
"Subjective   Reason for Visit: Yolanda Zabala is an 74 y.o. female here for a Medicare Wellness visit.     Past Medical, Surgical, and Family History reviewed and updated in chart.    Reviewed all medications by prescribing practitioner or clinical pharmacist (such as prescriptions, OTCs, herbal therapies and supplements) and documented in the medical record.    HPI here for annual exam. Care gaps reviewed. Sugars higher with A1c 12.4 she reports increase in pain in ribs and into armpits. She changed bras and no help. No dysuria or hematuria.     Patient Care Team:  Caridad Lincoln MD as PCP - General  Caridad Linclon MD as PCP - [employer]age PCP     Review of Systems   Constitutional:  Negative for chills, fatigue and fever.   HENT:  Negative for congestion and sore throat.    Eyes:  Negative for visual disturbance.   Respiratory:  Negative for cough and shortness of breath.    Cardiovascular:  Negative for chest pain.   Gastrointestinal:  Negative for abdominal pain, constipation, diarrhea, nausea and vomiting.   Genitourinary:  Negative for dysuria and frequency.   Skin:  Negative for rash.   Neurological:  Negative for headaches.       Objective   Vitals:  /68   Pulse 64   Temp 36.7 °C (98 °F)   Ht 1.626 m (5' 4\")   Wt 93.3 kg (205 lb 9.6 oz)   SpO2 97%   BMI 35.29 kg/m²       Physical Exam  Vitals reviewed.   Constitutional:       General: She is not in acute distress.     Appearance: Normal appearance.   HENT:      Head: Normocephalic.      Mouth/Throat:      Mouth: Mucous membranes are dry.      Pharynx: Oropharynx is clear.   Eyes:      Extraocular Movements: Extraocular movements intact.      Conjunctiva/sclera: Conjunctivae normal.   Cardiovascular:      Rate and Rhythm: Normal rate and regular rhythm.      Pulses: Normal pulses.      Heart sounds: No murmur heard.  Pulmonary:      Effort: Pulmonary effort is normal.      Breath sounds: Normal breath sounds. No " wheezing or rhonchi.   Abdominal:      General: Bowel sounds are normal. There is no distension.      Palpations: Abdomen is soft.      Tenderness: There is no abdominal tenderness. There is no rebound.   Musculoskeletal:      Right lower leg: No edema.      Left lower leg: No edema.   Skin:     General: Skin is warm.      Coloration: Skin is not jaundiced.   Neurological:      General: No focal deficit present.      Mental Status: She is alert and oriented to person, place, and time.      Cranial Nerves: No cranial nerve deficit.      Gait: Gait normal.   Psychiatric:         Mood and Affect: Mood normal.         Assessment & Plan  Routine general medical examination at health care facility    Reviewed low-fat low-cholesterol low-salt diet choices as well as encouraged regular physical activity for 30 minutes 4 times a week.  Mammogram up-to-date.  Colonoscopy up-to-date.  Immunizations-due for flu shot. Laboratory studies ordered.     Orders:    1 Year Follow Up In Primary Care - Wellness Exam; Future    Type 2 diabetes mellitus without complication, without long-term current use of insulin (Multi)    Orders:    POCT glycosylated hemoglobin (Hb A1C) manually resulted    semaglutide 2 mg/dose (8 mg/3 mL) pen injector; Inject 2 mg under the skin 1 (one) time per week.    Seizure (Multi)    Orders:    Magnesium; Future    Pulmonary fibrosis, unspecified (Multi)         Obesity, morbid (Multi)          Benign essential HTN    Orders:    CBC and Auto Differential; Future    Hypothyroidism, adult    Orders:    Thyroid Stimulating Hormone; Future    Thyroxine, Free; Future    Mixed hyperlipidemia    Orders:    Comprehensive Metabolic Panel; Future    Lipid Panel; Future    Lupus    Orders:    Sedimentation Rate; Future    C-reactive protein; Future    Anti-DNA Antibody, Double-Stranded; Future    C3 Complement; Future    C4 Complement; Future    Restrictive lung disease         Precordial pain    Orders:    XR chest 2  views; Future

## 2024-09-24 NOTE — ASSESSMENT & PLAN NOTE
Orders:    CBC and Auto Differential; Future     How Severe Are Your Spot(S)?: mild What Is The Reason For Today's Visit?: Full Body Skin Examination What Is The Reason For Today's Visit? (Being Monitored For X): the development of new lesions

## 2024-09-25 LAB
C3 SERPL-MCNC: 165 MG/DL (ref 87–200)
C4 SERPL-MCNC: 32 MG/DL (ref 10–50)
DSDNA AB SER-ACNC: 1 IU/ML

## 2024-09-26 DIAGNOSIS — M32.9 LUPUS: Primary | ICD-10-CM

## 2024-09-26 DIAGNOSIS — R53.83 FATIGUE, UNSPECIFIED TYPE: ICD-10-CM

## 2024-09-26 RX ORDER — IBUPROFEN 600 MG/1
600 TABLET ORAL 2 TIMES DAILY
Qty: 20 TABLET | Refills: 0 | Status: SHIPPED | OUTPATIENT
Start: 2024-09-26 | End: 2024-10-06

## 2024-09-26 RX ORDER — CALCIUM CARBONATE/VITAMIN D3 500-10/5ML
1 LIQUID (ML) ORAL DAILY
Qty: 30 CAPSULE | Refills: 11 | Status: SHIPPED | OUTPATIENT
Start: 2024-09-26 | End: 2025-09-26

## 2024-10-03 ENCOUNTER — PATIENT OUTREACH (OUTPATIENT)
Dept: CARE COORDINATION | Facility: CLINIC | Age: 74
End: 2024-10-03
Payer: MEDICARE

## 2024-10-03 ENCOUNTER — TELEPHONE (OUTPATIENT)
Dept: PRIMARY CARE | Facility: CLINIC | Age: 74
End: 2024-10-03
Payer: MEDICARE

## 2024-10-03 ENCOUNTER — HOSPITAL ENCOUNTER (OUTPATIENT)
Dept: RADIOLOGY | Facility: HOSPITAL | Age: 74
Discharge: HOME | End: 2024-10-03
Payer: MEDICARE

## 2024-10-03 DIAGNOSIS — R07.2 PRECORDIAL PAIN: ICD-10-CM

## 2024-10-03 DIAGNOSIS — E66.01 OBESITY, MORBID (MULTI): ICD-10-CM

## 2024-10-03 DIAGNOSIS — E11.9 TYPE 2 DIABETES MELLITUS WITHOUT COMPLICATION, WITHOUT LONG-TERM CURRENT USE OF INSULIN (MULTI): ICD-10-CM

## 2024-10-03 DIAGNOSIS — R26.81 UNSTEADY: ICD-10-CM

## 2024-10-03 PROCEDURE — 71046 X-RAY EXAM CHEST 2 VIEWS: CPT

## 2024-10-03 NOTE — PROGRESS NOTES
Received a referral from PCP this date due to financial strain along with transportation issues. Community Resources needed along with further assessment.  Latasha is a single 75 yo female patient of Dr. Caridad Stoner.  Hx of DM II, HTN, Stage 3A CKD, Glaucoma, Lupus.  A1C 12.4 last visit. Chart reviewed.  Phone call to Latasha on her listed #.  She is out with a friend and not able to talks freely.  Advised her to phone this SW at #[phone].  Also advise her that this SW is off tomorrow.  We will await a return phone call for further assessment.

## 2024-10-03 NOTE — PROGRESS NOTES
Received return phone call from Latasha.  Several financial issues noted.  Active with [place] [facility].  VM left for Maria Luz Palma LSW with [facility].  Latasha not sure what Medicaid program she is on.  She does get $23 SNAP, HEAP, Meds covered by Medicaid, Lost car in May due to cost of lease, insurance, and gas.  Using [employer] and friend assists at times.  Getting $50 food card form her [place].  Has nephew and niece in [place].  Getting PERS and SS currently.  Not interested in MOW.  Able to meet ADL and AIDL needs.  Aware of increased A1C at 12.4.  Trying to eat healthier.  Interested in meeting with a Dietician.  Further phone calls needed by this SW to see what options are available.  Not a .  Single never .  Care Plan established.  Latasha has our # [phone].  Further assessment needed and collaboration with [facility] CHILDERS.

## 2024-10-07 ENCOUNTER — TELEPHONE (OUTPATIENT)
Dept: PRIMARY CARE | Facility: CLINIC | Age: 74
End: 2024-10-07
Payer: MEDICARE

## 2024-10-07 DIAGNOSIS — E11.9 TYPE 2 DIABETES MELLITUS WITHOUT COMPLICATION, WITHOUT LONG-TERM CURRENT USE OF INSULIN (MULTI): Primary | ICD-10-CM

## 2024-10-07 NOTE — TELEPHONE ENCOUNTER
CASE SARAH CONTACTED THIS OFFICE TO REPORT PATIENT VOICED INTEREST IN DIABETES DIET EDUCATION. REFERRAL PLACED

## 2024-10-08 DIAGNOSIS — J98.4 RESTRICTIVE LUNG DISEASE: ICD-10-CM

## 2024-10-08 RX ORDER — ALBUTEROL SULFATE 90 UG/1
2 INHALANT RESPIRATORY (INHALATION) EVERY 6 HOURS PRN
Qty: 18 G | Refills: 3 | Status: SHIPPED | OUTPATIENT
Start: 2024-10-08

## 2024-10-14 ENCOUNTER — PATIENT OUTREACH (OUTPATIENT)
Dept: CARE COORDINATION | Facility: CLINIC | Age: 74
End: 2024-10-14
Payer: MEDICARE

## 2024-10-14 NOTE — PROGRESS NOTES
Chart reviewed.  Phone call to Latasha this date.  She is awaiting DM Education to phone her to set up a phone visit likely for further education.  Latasha shares she is going to use her [employer] gift card tomorrow with her friend taking her shopping and the hope of buying healthier choices of food.  Reviewed resources and [facility] on Aging [facility] for a meal and socialization due to isolation.  Care Plan updated.  GDA active.  We will monitor and assist.

## 2024-10-21 ENCOUNTER — TELEPHONE (OUTPATIENT)
Dept: PRIMARY CARE | Facility: CLINIC | Age: 74
End: 2024-10-21

## 2024-10-21 ENCOUNTER — APPOINTMENT (OUTPATIENT)
Dept: RHEUMATOLOGY | Facility: CLINIC | Age: 74
End: 2024-10-21
Payer: MEDICARE

## 2024-10-21 VITALS
WEIGHT: 208.4 LBS | DIASTOLIC BLOOD PRESSURE: 73 MMHG | SYSTOLIC BLOOD PRESSURE: 118 MMHG | HEIGHT: 64 IN | BODY MASS INDEX: 35.58 KG/M2 | HEART RATE: 60 BPM | OXYGEN SATURATION: 97 %

## 2024-10-21 DIAGNOSIS — Z79.899 LONG-TERM USE OF PLAQUENIL: ICD-10-CM

## 2024-10-21 DIAGNOSIS — M10.9 GOUT, UNSPECIFIED CAUSE, UNSPECIFIED CHRONICITY, UNSPECIFIED SITE: Primary | ICD-10-CM

## 2024-10-21 DIAGNOSIS — M32.9 SYSTEMIC LUPUS ERYTHEMATOSUS, UNSPECIFIED SLE TYPE, UNSPECIFIED ORGAN INVOLVEMENT STATUS (MULTI): Primary | ICD-10-CM

## 2024-10-21 PROCEDURE — 4010F ACE/ARB THERAPY RXD/TAKEN: CPT | Performed by: INTERNAL MEDICINE

## 2024-10-21 PROCEDURE — 1159F MED LIST DOCD IN RCRD: CPT | Performed by: INTERNAL MEDICINE

## 2024-10-21 PROCEDURE — 3008F BODY MASS INDEX DOCD: CPT | Performed by: INTERNAL MEDICINE

## 2024-10-21 PROCEDURE — 3078F DIAST BP <80 MM HG: CPT | Performed by: INTERNAL MEDICINE

## 2024-10-21 PROCEDURE — 99213 OFFICE O/P EST LOW 20 MIN: CPT | Performed by: INTERNAL MEDICINE

## 2024-10-21 PROCEDURE — 3060F POS MICROALBUMINURIA REV: CPT | Performed by: INTERNAL MEDICINE

## 2024-10-21 PROCEDURE — 1157F ADVNC CARE PLAN IN RCRD: CPT | Performed by: INTERNAL MEDICINE

## 2024-10-21 PROCEDURE — 3048F LDL-C <100 MG/DL: CPT | Performed by: INTERNAL MEDICINE

## 2024-10-21 PROCEDURE — 3074F SYST BP LT 130 MM HG: CPT | Performed by: INTERNAL MEDICINE

## 2024-10-21 PROCEDURE — G2211 COMPLEX E/M VISIT ADD ON: HCPCS | Performed by: INTERNAL MEDICINE

## 2024-10-21 RX ORDER — IBUPROFEN 600 MG/1
600 TABLET ORAL EVERY 6 HOURS PRN
COMMUNITY

## 2024-10-21 RX ORDER — ALLOPURINOL 100 MG/1
300 TABLET ORAL DAILY
COMMUNITY
End: 2024-10-21 | Stop reason: SDUPTHER

## 2024-10-21 RX ORDER — PIOGLITAZONEHYDROCHLORIDE 30 MG/1
TABLET ORAL
COMMUNITY

## 2024-10-21 RX ORDER — ALLOPURINOL 300 MG/1
300 TABLET ORAL DAILY
Qty: 90 TABLET | Refills: 3 | Status: SHIPPED | OUTPATIENT
Start: 2024-10-21 | End: 2025-10-21

## 2024-10-21 NOTE — TELEPHONE ENCOUNTER
LOV: 2024 UPCOMIN2024   FAX CAME OVER FROM PTS PHARMACY REQUESTING SCRIPT FOR PTS ALLOPURINOL 100MG TABLET. TAKE 3 TABLETS BY MOUTH ONCE DAILY. IT IS NOT ON PATIENTS MEDICATION LIST. PLEASE SEND TO [facility]

## 2024-10-21 NOTE — PROGRESS NOTES
"Subjective   Patient ID: Yolanda Zabala is a 74 y.o. female who presents for Follow-up (8 month follow up /1 month ago PCP had xray done. ).  HPI  Patient with history of systemic lupus and history of lupus nephritis previous patient of Dr. Blackmon.  Patient says that she had a biopsy in the 80s and was told that she had lupus.  Also history of obstructive sleep apnea, type 2 diabetes, hypertension, hypothyroidism, heart block.    We last saw her in February.  We have had her continue with hydroxychloroquine  She has followed with nephrology and June.  Her hemoglobin A1c was 12.4.  She doesn't have a car and has to take the local transit or friends.  She did have some soreness in her ribs and armpits and back.  She had a chest x-ray and was given ibuprofen and it did help.  She gets occasional soreness in her armpits.  Her breathing is about the same and was given a new inhaler.    Her feet do bother her especially at the right big toe.  Will be leaving Wednesday for her great nephew's wedding and flying there with her other nephew.  Her right knee does bother her at times but does not have the same amount of pain that her left knee did when she had a replacement a few years ago.    Review of Systems      Visit Vitals  /73   Pulse 60   Ht 1.626 m (5' 4\")   Wt 94.5 kg (208 lb 6.4 oz)   SpO2 97%   BMI 35.77 kg/m²   OB Status Postmenopausal   Smoking Status Never   BSA 2.07 m²       Objective   Physical Exam  GEN: NAD A&O x3 appropriate affect some difficulty getting onto the exam table  HENT:no enlarged glands or thyroid  EYES:  no conjunctival redness, eyelids normal  LYMPH: no cervical or axillary lymphadenopathy  CV: RRR, no MRG, no lower extremity edema  RESP: CTA B/L with normal respiratory effort and no intercostal retractions  TENDER POINTS: 0/18   MSK:  Right knee enlargement left knee replacement  No swelling or tenderness in the hands wrist elbows shoulders  Kyphosis of the thoracic " spine    Assessment/Plan   Patient with history of lupus and lupus nephritis.    Systemic lupus   -Reviewed her blood work from September and she had normal complements and anti-double-stranded DNA.  She had normal CRP but Again mildly elevated ESR.  -patient is stable on plaquenil and has an eye appointment tomorrow.    Will see her again in 6 to 8 months or as needed

## 2024-10-21 NOTE — TELEPHONE ENCOUNTER
CALL TO PT MADE. I WENT OVER MESSAGE FROM PROVIDER WITH HER. SHE STATED UNDERSTANDING AND HAD NO FURTHER QUESTIONS.

## 2024-10-23 ENCOUNTER — DOCUMENTATION (OUTPATIENT)
Dept: CARE COORDINATION | Facility: CLINIC | Age: 74
End: 2024-10-23
Payer: MEDICARE

## 2024-10-29 DIAGNOSIS — I10 BENIGN ESSENTIAL HTN: Primary | ICD-10-CM

## 2024-10-29 RX ORDER — METOPROLOL TARTRATE 100 MG/1
50 TABLET ORAL 2 TIMES DAILY
Qty: 180 TABLET | Refills: 3 | Status: SHIPPED | OUTPATIENT
Start: 2024-10-29

## 2024-11-06 ENCOUNTER — DOCUMENTATION (OUTPATIENT)
Dept: CARE COORDINATION | Facility: CLINIC | Age: 74
End: 2024-11-06
Payer: MEDICARE

## 2024-11-06 NOTE — PROGRESS NOTES
Chart reviewed.  Outreach to Yolanda.  She is awaiting outreach from DM Ed and willing to take out of county transportation via [employer] ( [employer] Transit out of county to [facility] to meet DM Ed.  She also shares that she has a Freestyle Mikhail 2 and would like assistance to have the ap placed on her track phone. She is not wanting MOW.  She is active with [place] [facility] on Aging Maria Luz Sada.  Using [place] [employer] and out of county transportation along with a friend.  Has Voucher for transportation. Care Plan updated and reviewed.  Latasha has our # [phone].

## 2024-11-07 ENCOUNTER — TELEPHONE (OUTPATIENT)
Dept: PRIMARY CARE | Facility: CLINIC | Age: 74
End: 2024-11-07
Payer: MEDICARE

## 2024-11-07 DIAGNOSIS — E11.9 TYPE 2 DIABETES MELLITUS WITHOUT COMPLICATION, WITHOUT LONG-TERM CURRENT USE OF INSULIN (MULTI): ICD-10-CM

## 2024-11-07 NOTE — TELEPHONE ENCOUNTER
CALL TO PT MADE. I WENT OVER MESSAGE WITH HER. SHE STATED UNDERSTANDING AND HAD NO FURTHER QUESTIONS.

## 2024-11-12 ENCOUNTER — APPOINTMENT (OUTPATIENT)
Dept: PHARMACY | Facility: HOSPITAL | Age: 74
End: 2024-11-12
Payer: MEDICARE

## 2024-11-12 DIAGNOSIS — E11.9 TYPE 2 DIABETES MELLITUS WITHOUT COMPLICATION, WITHOUT LONG-TERM CURRENT USE OF INSULIN (MULTI): ICD-10-CM

## 2024-11-12 RX ORDER — TIRZEPATIDE 7.5 MG/.5ML
7.5 INJECTION, SOLUTION SUBCUTANEOUS
Qty: 2 ML | Refills: 3 | Status: SHIPPED | OUTPATIENT
Start: 2024-11-12

## 2024-11-12 NOTE — ASSESSMENT & PLAN NOTE
Patient's goal A1c is < 7%.  Is pt at goal? No, 12.4  Patient's SMBGs are around 140 to 150 per patient. Has been on increased dose of Ozempic for 2 months. Ok with change to Mounjaro     Rationale for plan: Switch to Mounjaro for more potent GLP1.    Medication Changes:  CONTINUE:  Farxiga 10 mg daily  Metformin 1000 mg twice daily  STOP  Ozempic 2 mg weekly   START  Mounjaro 7.5 mg once weekly  Future considerations  Testing for c peptides

## 2024-11-12 NOTE — PROGRESS NOTES
Clinical Pharmacy Appointment    Patient ID: Yolanda Zabala is a 74 y.o. female who presents for Diabetes.    Pt is here for First appointment.     Referring Provider/PCP: Caridad Lincoln*  Last visit with PCP: 9/24/24   Next visit with PCP: 12/3/24    Subjective   HPI  DIABETES MELLITUS TYPE II:    Diagnosed with diabetes:  at least 2018. Known diabetic complications: None.  Does patient follow with Endocrinology: No  Last optometry exam: 10/22/24  Most recent visit in Podiatry: Every 10 weeks     Current diabetic medications include:  Farxiga 10 mg daily  Metformin 1000 mg twice daily  Ozempic 2 mg weekly on Mondays    Clarifications to above regimen: none  Adverse Effects: none    Past diabetic medications include:  Actos 30 mg  Glimepiride 4 mg daily    Glucose Readings:  Glucometer/CGM Type: Freestyle 2 (Uses reader)  Patient tests BG continuously times per day    Current home BG readings: 140-150  Previous home BG readings: N/A    Any episodes of hypoglycemia? No,   .  Did patient treat episode of hypoglycemia appropriately? N/A  Does the patient have a prescription for ready-to-use Glucagon? Not on insulin  Does pt have proteinuria? Yes    Lifestyle:  Diet: Unknown meals/day.  BK: Unknown  LN: Unknown  DN: Unknown  Snacks: Unknown  Drinks: Unknown  Physical Activity: Unknown    Secondary Prevention:  Statin? Yes  ACE-I/ARB? Yes  Aspirin? No    Pertinent PMH Review:  PMH of Pancreatitis: No  PMH of Retinopathy: No  PMH of Urinary Tract Infections: No  PMH of MTC: No    Medication Reconciliation:  Changed:   Added:   Discontinued:     Drug Interactions  No relevant drug interactions were noted.    Medication System Management  Patients preferred pharmacy: [facility]  Adherence/Organization: None  Affordability/Accessibility: None      Objective   Allergies   Allergen Reactions    Ace Inhibitors Unknown    Bacitracin Unknown    Penicillins Unknown     can't remember    Phenytoin Unknown     Sulfamethoxazole-Trimethoprim Unknown     Social History     Social History Narrative    Not on file      Medication Review  Current Outpatient Medications   Medication Instructions    albuterol 90 mcg/actuation inhaler 2 puffs, inhalation, Every 6 hours PRN    allopurinol (ZYLOPRIM) 300 mg, oral, Daily    atorvastatin (LIPITOR) 20 mg, oral, Daily    blood sugar diagnostic strip Please prescribe strips compatible with Relion Premier Classic    cetirizine (ZYRTEC) 10 mg, oral, Daily    dapagliflozin propanediol (Farxiga) 10 mg Take two  5 mg tablets orally daily    flash glucose sensor kit (FreeStyle Mikhail 2 Sensor) kit Use as instructed    FreeStyle Mikhail reader (FreeStyle Mikhail 2 [place]) misc Use as instructed    hydroxychloroquine (PLAQUENIL) 200 mg, oral, Daily    ibuprofen 600 mg, Every 6 hours PRN    latanoprost (Xalatan) 0.005 % ophthalmic solution INSTILL 1 DROP INTO EACH EYE ONCE DAILY AT NIGHT    levothyroxine (SYNTHROID, LEVOXYL) 100 mcg, oral, Daily    losartan (COZAAR) 50 mg, oral, 2 times daily    magnesium oxide 400 mg, oral, Daily    metFORMIN (GLUCOPHAGE) 1,000 mg, oral, 2 times daily (morning and late afternoon), Take with morning and evening meal.    metoprolol tartrate (LOPRESSOR) 50 mg, oral, 2 times daily    Mounjaro 7.5 mg, subcutaneous, Every 7 days      Vitals  BP Readings from Last 2 Encounters:   10/21/24 118/73   09/24/24 122/68     BMI Readings from Last 1 Encounters:   10/21/24 35.77 kg/m²      Labs  A1C  Lab Results   Component Value Date    HGBA1C 12.4 (A) 09/24/2024    HGBA1C 9.6 (A) 05/30/2024    HGBA1C 8.4 (A) 11/30/2023     BMP  Lab Results   Component Value Date    CALCIUM 10.1 09/24/2024     09/24/2024    K 4.9 09/24/2024    CO2 22 09/24/2024     09/24/2024    BUN 30 (H) 09/24/2024    CREATININE 0.67 09/24/2024    EGFR >90 09/24/2024     LFTs  Lab Results   Component Value Date    ALT 43 09/24/2024    AST 43 (H) 09/24/2024    ALKPHOS 109 09/24/2024    BILITOT 0.7  09/24/2024     FLP  Lab Results   Component Value Date    TRIG 331 (H) 09/24/2024    CHOL 119 09/24/2024    LDLF 70 08/08/2022    LDLCALC 21 09/24/2024    HDL 31.8 09/24/2024     Urine Microalbumin  Lab Results   Component Value Date    MICROALBCREA 383.7 (H) 06/18/2024     Weight Management  Wt Readings from Last 3 Encounters:   10/21/24 94.5 kg (208 lb 6.4 oz)   09/24/24 93.3 kg (205 lb 9.6 oz)   06/24/24 96.8 kg (213 lb 6.4 oz)      There is no height or weight on file to calculate BMI.     Assessment/Plan   Problem List Items Addressed This Visit       Type 2 diabetes mellitus without complication, without long-term current use of insulin (Multi)     Patient's goal A1c is < 7%.  Is pt at goal? No, 12.4  Patient's SMBGs are around 140 to 150 per patient. Has been on increased dose of Ozempic for 2 months. Ok with change to Mounjaro     Rationale for plan: Switch to Mounjaro for more potent GLP1.    Medication Changes:  CONTINUE:  Farxiga 10 mg daily  Metformin 1000 mg twice daily  STOP  Ozempic 2 mg weekly   START  Mounjaro 7.5 mg once weekly  Future considerations  Testing for c peptides           Relevant Medications    tirzepatide (Mounjaro) 7.5 mg/0.5 mL pen injector    Other Relevant Orders    Referral to Nutrition Services    Referral to Clinical Pharmacy       Clinical Pharmacist follow-up: 12/3 at 10 am, Telehealth visit    Continue all meds under the continuation of care with the referring provider and clinical pharmacy team.    Thank you,  Noris Pastor, PharmD, [facility]  Clinical Pharmacy Specialist  [phone]    Verbal consent to manage patient's drug therapy was obtained from the patient. They were informed they may decline to participate or withdraw from participation in pharmacy services at any time.

## 2024-11-12 NOTE — Clinical Note
Switching patient to Mounjaro to see if that helps with glucoses. Also put in referral for dietian.  Will follow and titrate. May need testing for type 1 diabetes or cushings if no improvement.

## 2024-11-13 ENCOUNTER — TELEPHONE (OUTPATIENT)
Dept: PRIMARY CARE | Facility: CLINIC | Age: 74
End: 2024-11-13
Payer: MEDICARE

## 2024-11-13 NOTE — TELEPHONE ENCOUNTER
She has been working with Noris Pastor  pharmacy.She states that she has been referred to a nutritionist.She has been having issues with using her my chart and wants to know if she can do this through another way.I advised that she call Noris Pastor to discuss this.

## 2024-11-14 ENCOUNTER — PATIENT OUTREACH (OUTPATIENT)
Dept: CARE COORDINATION | Facility: CLINIC | Age: 74
End: 2024-11-14
Payer: MEDICARE

## 2024-11-14 NOTE — PROGRESS NOTES
Chart reviewed.  Phone call to Latasha this date.  She met with  Lynne Tomlin at the [facility] on Aging to opt for her 2025 health care insurance.  Latasha shares that she will be starting her DM medication Mounjaro on Monday.  She is awaiting her appointment on 12/13/2024 with Nutrition, Zabrina Watson RD, LD and hoping to make changes and bring down he A1C.  Using [email or web address] today and awaiting her ride back home.  Care Plan updated.  Latasha has our # [phone].  We will follow

## 2024-11-25 ENCOUNTER — TELEMEDICINE (OUTPATIENT)
Dept: PHARMACY | Facility: HOSPITAL | Age: 74
End: 2024-11-25
Payer: MEDICARE

## 2024-11-25 DIAGNOSIS — E11.9 TYPE 2 DIABETES MELLITUS WITHOUT COMPLICATION, WITHOUT LONG-TERM CURRENT USE OF INSULIN (MULTI): ICD-10-CM

## 2024-11-25 DIAGNOSIS — E11.9 TYPE 2 DIABETES MELLITUS WITHOUT COMPLICATION, WITHOUT LONG-TERM CURRENT USE OF INSULIN (MULTI): Primary | ICD-10-CM

## 2024-11-25 RX ORDER — TIRZEPATIDE 5 MG/.5ML
5 INJECTION, SOLUTION SUBCUTANEOUS
Qty: 2 ML | Refills: 3 | Status: SHIPPED | OUTPATIENT
Start: 2024-11-25

## 2024-11-25 NOTE — ASSESSMENT & PLAN NOTE
Patient's goal A1c is < 7%.  Is pt at goal? No, 12.4  Patient's SMBGs are around 140 to 150 per patient. Patient reporting diarrhea and nausea since switching last week to Mounjaro  Rationale for plan: Decrease dose of Mounjaro.    Medication Changes:  CONTINUE:  Farxiga 10 mg daily  Metformin 1000 mg twice daily  DECREASE  Mounjaro 7.5 to 5 mg once weekly  Future considerations  Testing for c peptides

## 2024-11-25 NOTE — Clinical Note
Patient switched from Ozempic 2 mg to Mounjaro 7.5 last week. Reports diarrhea and mild nausea, will decrease to 5 mg of Mounjaro to help with tolerance.

## 2024-11-25 NOTE — PROGRESS NOTES
Clinical Pharmacy Appointment    Patient ID: Yolanda Zabala is a 74 y.o. female who presents for Diabetes.    Pt is here for Follow Up appointment.     Referring Provider/PCP: Caridad Lincoln*  Last visit with PCP: 9/24/24   Next visit with PCP: 12/3/24    Subjective   HPI  DIABETES MELLITUS TYPE II:    Diagnosed with diabetes:  at least 2018. Known diabetic complications: None.  Does patient follow with Endocrinology: No  Last optometry exam: 10/22/24  Most recent visit in Podiatry: Every 10 weeks     Current diabetic medications include:  Farxiga 10 mg daily  Metformin 1000 mg twice daily  Mounjaro 7.5 weekly on Mondays x 1    Clarifications to above regimen: none  Adverse Effects: Diarrhea and nausea since starting Mounjaro    Past diabetic medications include:  Actos 30 mg  Glimepiride 4 mg daily    Glucose Readings:  Glucometer/CGM Type: Freestyle 2 (Uses reader)  Patient tests BG continuously times per day    Current home BG readings: 140-150  Previous home BG readings: N/A    Any episodes of hypoglycemia? No,   .  Did patient treat episode of hypoglycemia appropriately? N/A  Does the patient have a prescription for ready-to-use Glucagon? Not on insulin  Does pt have proteinuria? Yes    Lifestyle:  Diet: Unknown meals/day.  BK: Unknown  LN: Unknown  DN: Unknown  Snacks: Unknown  Drinks: Unknown  Physical Activity: Unknown    Secondary Prevention:  Statin? Yes  ACE-I/ARB? Yes  Aspirin? No    Pertinent PMH Review:  PMH of Pancreatitis: No  PMH of Retinopathy: No  PMH of Urinary Tract Infections: No  PMH of MTC: No    Medication Reconciliation:  Changed:   Added:   Discontinued:     Drug Interactions  No relevant drug interactions were noted.    Medication System Management  Patients preferred pharmacy: [facility]  Adherence/Organization: None  Affordability/Accessibility: None      Objective   Allergies   Allergen Reactions    Ace Inhibitors Unknown    Bacitracin Unknown    Penicillins Unknown     can't  remember    Phenytoin Unknown    Sulfamethoxazole-Trimethoprim Unknown     Social History     Social History Narrative    Not on file      Medication Review  Current Outpatient Medications   Medication Instructions    albuterol 90 mcg/actuation inhaler 2 puffs, inhalation, Every 6 hours PRN    allopurinol (ZYLOPRIM) 300 mg, oral, Daily    atorvastatin (LIPITOR) 20 mg, oral, Daily    blood sugar diagnostic strip Please prescribe strips compatible with Relion Premier Classic    cetirizine (ZYRTEC) 10 mg, oral, Daily    dapagliflozin propanediol (Farxiga) 10 mg Take two  5 mg tablets orally daily    flash glucose sensor kit (FreeStyle Mikhail 2 Sensor) kit Use as instructed    FreeStyle Mikhail reader (FreeStyle Mikhail 2 [place]) misc Use as instructed    hydroxychloroquine (PLAQUENIL) 200 mg, oral, Daily    ibuprofen 600 mg, Every 6 hours PRN    latanoprost (Xalatan) 0.005 % ophthalmic solution INSTILL 1 DROP INTO EACH EYE ONCE DAILY AT NIGHT    levothyroxine (SYNTHROID, LEVOXYL) 100 mcg, oral, Daily    losartan (COZAAR) 50 mg, oral, 2 times daily    magnesium oxide 400 mg, oral, Daily    metFORMIN (GLUCOPHAGE) 1,000 mg, oral, 2 times daily (morning and late afternoon), Take with morning and evening meal.    metoprolol tartrate (LOPRESSOR) 50 mg, oral, 2 times daily    Mounjaro 5 mg, subcutaneous, Every 7 days      Vitals  BP Readings from Last 2 Encounters:   10/21/24 118/73   09/24/24 122/68     BMI Readings from Last 1 Encounters:   10/21/24 35.77 kg/m²      Labs  A1C  Lab Results   Component Value Date    HGBA1C 12.4 (A) 09/24/2024    HGBA1C 9.6 (A) 05/30/2024    HGBA1C 8.4 (A) 11/30/2023     BMP  Lab Results   Component Value Date    CALCIUM 10.1 09/24/2024     09/24/2024    K 4.9 09/24/2024    CO2 22 09/24/2024     09/24/2024    BUN 30 (H) 09/24/2024    CREATININE 0.67 09/24/2024    EGFR >90 09/24/2024     LFTs  Lab Results   Component Value Date    ALT 43 09/24/2024    AST 43 (H) 09/24/2024    ALKPHOS 109  09/24/2024    BILITOT 0.7 09/24/2024     FLP  Lab Results   Component Value Date    TRIG 331 (H) 09/24/2024    CHOL 119 09/24/2024    LDLF 70 08/08/2022    LDLCALC 21 09/24/2024    HDL 31.8 09/24/2024     Urine Microalbumin  Lab Results   Component Value Date    MICROALBCREA 383.7 (H) 06/18/2024     Weight Management  Wt Readings from Last 3 Encounters:   10/21/24 94.5 kg (208 lb 6.4 oz)   09/24/24 93.3 kg (205 lb 9.6 oz)   06/24/24 96.8 kg (213 lb 6.4 oz)      There is no height or weight on file to calculate BMI.     Assessment/Plan   Problem List Items Addressed This Visit       Type 2 diabetes mellitus without complication, without long-term current use of insulin (Multi)     Patient's goal A1c is < 7%.  Is pt at goal? No, 12.4  Patient's SMBGs are around 140 to 150 per patient. Patient reporting diarrhea and nausea since switching last week to Mounjaro  Rationale for plan: Decrease dose of Mounjaro.    Medication Changes:  CONTINUE:  Farxiga 10 mg daily  Metformin 1000 mg twice daily  DECREASE  Mounjaro 7.5 to 5 mg once weekly  Future considerations  Testing for c peptides           Relevant Medications    tirzepatide (Mounjaro) 5 mg/0.5 mL pen injector       Clinical Pharmacist follow-up: 12/3 at 10 am, Telehealth visit    Continue all meds under the continuation of care with the referring provider and clinical pharmacy team.    Thank you,  Noris Pastor, PharmD, [facility]  Clinical Pharmacy Specialist  [phone]    Verbal consent to manage patient's drug therapy was obtained from the patient. They were informed they may decline to participate or withdraw from participation in pharmacy services at any time.

## 2024-11-27 ENCOUNTER — PATIENT OUTREACH (OUTPATIENT)
Dept: CARE COORDINATION | Facility: CLINIC | Age: 74
End: 2024-11-27
Payer: MEDICARE

## 2024-11-27 NOTE — PROGRESS NOTES
Chart reviewed.  Noted that Latasha had a telemedicine visit on 11/25/2024 with Clinical Pharmacy.  Decreasing Mounjaro due to Nausea dn diarrhea.  Noted that Latasha has an upcoming appointment as well with Zabrina Watson RD, CLAUDIA on 12/13/2024 and PCP on 12/3/2024.  Phone call to Latasha this date.  She shares that she is also going to PT OP in [place].  She has rides to her upcoming appointments and some on Telemedicine visits.  Latasha continues to decline Home Delivered Meals and going to the [facility] [facility] at this time.  She is hoping to decrease her A1C as well.  She is active with the [facility] Dept on Aging Lynneyolanda Palma .  They has assisted her with vouchers, gift card, and other items.  She is on SNAP and HEAP as well as $50 food card from her [place] monthly.  Care Plan updated.  Latasha has our # [phone].  We will follow

## 2024-12-03 ENCOUNTER — TELEMEDICINE (OUTPATIENT)
Dept: PHARMACY | Facility: HOSPITAL | Age: 74
End: 2024-12-03
Payer: MEDICARE

## 2024-12-03 ENCOUNTER — APPOINTMENT (OUTPATIENT)
Dept: PRIMARY CARE | Facility: CLINIC | Age: 74
End: 2024-12-03
Payer: MEDICARE

## 2024-12-03 VITALS
WEIGHT: 202.4 LBS | DIASTOLIC BLOOD PRESSURE: 66 MMHG | HEART RATE: 66 BPM | TEMPERATURE: 97.5 F | HEIGHT: 64 IN | BODY MASS INDEX: 34.56 KG/M2 | OXYGEN SATURATION: 96 % | SYSTOLIC BLOOD PRESSURE: 108 MMHG

## 2024-12-03 DIAGNOSIS — I10 BENIGN ESSENTIAL HTN: ICD-10-CM

## 2024-12-03 DIAGNOSIS — E78.2 MIXED HYPERLIPIDEMIA: ICD-10-CM

## 2024-12-03 DIAGNOSIS — E03.9 HYPOTHYROIDISM, ADULT: Primary | ICD-10-CM

## 2024-12-03 DIAGNOSIS — E11.9 TYPE 2 DIABETES MELLITUS WITHOUT COMPLICATION, WITHOUT LONG-TERM CURRENT USE OF INSULIN (MULTI): ICD-10-CM

## 2024-12-03 DIAGNOSIS — J40 BRONCHITIS: ICD-10-CM

## 2024-12-03 DIAGNOSIS — E66.01 OBESITY, MORBID (MULTI): ICD-10-CM

## 2024-12-03 LAB — POC HEMOGLOBIN A1C: 7.6 % (ref 4.2–6.5)

## 2024-12-03 PROCEDURE — 1036F TOBACCO NON-USER: CPT | Performed by: FAMILY MEDICINE

## 2024-12-03 PROCEDURE — 1124F ACP DISCUSS-NO DSCNMKR DOCD: CPT | Performed by: FAMILY MEDICINE

## 2024-12-03 PROCEDURE — 3060F POS MICROALBUMINURIA REV: CPT | Performed by: FAMILY MEDICINE

## 2024-12-03 PROCEDURE — 3078F DIAST BP <80 MM HG: CPT | Performed by: FAMILY MEDICINE

## 2024-12-03 PROCEDURE — 1126F AMNT PAIN NOTED NONE PRSNT: CPT | Performed by: FAMILY MEDICINE

## 2024-12-03 PROCEDURE — 3074F SYST BP LT 130 MM HG: CPT | Performed by: FAMILY MEDICINE

## 2024-12-03 PROCEDURE — 1159F MED LIST DOCD IN RCRD: CPT | Performed by: FAMILY MEDICINE

## 2024-12-03 PROCEDURE — 83036 HEMOGLOBIN GLYCOSYLATED A1C: CPT | Performed by: FAMILY MEDICINE

## 2024-12-03 PROCEDURE — 3048F LDL-C <100 MG/DL: CPT | Performed by: FAMILY MEDICINE

## 2024-12-03 PROCEDURE — 3008F BODY MASS INDEX DOCD: CPT | Performed by: FAMILY MEDICINE

## 2024-12-03 PROCEDURE — 1157F ADVNC CARE PLAN IN RCRD: CPT | Performed by: FAMILY MEDICINE

## 2024-12-03 PROCEDURE — 99214 OFFICE O/P EST MOD 30 MIN: CPT | Performed by: FAMILY MEDICINE

## 2024-12-03 PROCEDURE — 4010F ACE/ARB THERAPY RXD/TAKEN: CPT | Performed by: FAMILY MEDICINE

## 2024-12-03 RX ORDER — DOXYCYCLINE HYCLATE 100 MG
100 TABLET ORAL 2 TIMES DAILY
Qty: 14 TABLET | Refills: 0 | Status: SHIPPED | OUTPATIENT
Start: 2024-12-03 | End: 2024-12-10

## 2024-12-03 ASSESSMENT — ENCOUNTER SYMPTOMS
HEADACHES: 0
DIARRHEA: 0
COUGH: 1
SHORTNESS OF BREATH: 0
SINUS PAIN: 1
FREQUENCY: 0
SORE THROAT: 0
CHILLS: 0
CONSTIPATION: 0
VOMITING: 0
NAUSEA: 0
ABDOMINAL PAIN: 0
FEVER: 0
DYSURIA: 0
FATIGUE: 0

## 2024-12-03 ASSESSMENT — PATIENT HEALTH QUESTIONNAIRE - PHQ9
1. LITTLE INTEREST OR PLEASURE IN DOING THINGS: NOT AT ALL
SUM OF ALL RESPONSES TO PHQ9 QUESTIONS 1 AND 2: 0
2. FEELING DOWN, DEPRESSED OR HOPELESS: NOT AT ALL

## 2024-12-03 ASSESSMENT — LIFESTYLE VARIABLES: HOW OFTEN DO YOU HAVE A DRINK CONTAINING ALCOHOL: NEVER

## 2024-12-03 ASSESSMENT — PAIN SCALES - GENERAL: PAINLEVEL_OUTOF10: 0-NO PAIN

## 2024-12-03 NOTE — PROGRESS NOTES
Clinical Pharmacy Appointment    Patient ID: Yolanda Zabala is a 74 y.o. female who presents for Diabetes.    Pt is here for Follow Up appointment.     Referring Provider/PCP: Caridad Lincoln*  Last visit with PCP: 9/24/24   Next visit with PCP: 12/3/24    Subjective   HPI  DIABETES MELLITUS TYPE II:    Diagnosed with diabetes: at least 2018. Known diabetic complications: None.  Does patient follow with Endocrinology: No  Last optometry exam: 10/22/24  Most recent visit in Podiatry: Every 10 weeks     Current diabetic medications include:  Farxiga 10 mg daily  Metformin 1000 mg twice daily  Mounjaro 5 weekly on Monday x 2    Clarifications to above regimen: none  Adverse Effects: Has been sick so hard to tell if side effect or being sick    Past diabetic medications include:  Actos 30 mg  Glimepiride 4 mg daily    Glucose Readings:  Glucometer/CGM Type: Freestyle 2 (Uses reader)  Patient tests BG continuously times per day    Current home BG readings: Went up because sick, currently 148 after eating.   Previous home BG readings: 140-150    Any episodes of hypoglycemia? No,   .  Did patient treat episode of hypoglycemia appropriately? N/A  Does the patient have a prescription for ready-to-use Glucagon? Not on insulin  Does pt have proteinuria? Yes    Lifestyle:  Diet: Unknown meals/day.  BK: Unknown  LN: Unknown  DN: Unknown  Snacks: Unknown  Drinks: Unknown  Physical Activity: Unknown    Secondary Prevention:  Statin? Yes  ACE-I/ARB? Yes  Aspirin? No    Pertinent PMH Review:  PMH of Pancreatitis: No  PMH of Retinopathy: No  PMH of Urinary Tract Infections: No  PMH of MTC: No    Medication Reconciliation:  Changed:   Added:   Discontinued:     Drug Interactions  No relevant drug interactions were noted.    Medication System Management  Patients preferred pharmacy: [facility]  Adherence/Organization: None  Affordability/Accessibility: None      Objective   Allergies   Allergen Reactions    Ace Inhibitors  Unknown    Bacitracin Unknown    Penicillins Unknown     can't remember    Phenytoin Unknown    Sulfamethoxazole-Trimethoprim Unknown     Social History     Social History Narrative    Not on file      Medication Review  Current Outpatient Medications   Medication Instructions    albuterol 90 mcg/actuation inhaler 2 puffs, inhalation, Every 6 hours PRN    allopurinol (ZYLOPRIM) 300 mg, oral, Daily    atorvastatin (LIPITOR) 20 mg, oral, Daily    blood sugar diagnostic strip Please prescribe strips compatible with Relion Premier Classic    cetirizine (ZYRTEC) 10 mg, oral, Daily    dapagliflozin propanediol (Farxiga) 10 mg Take two  5 mg tablets orally daily    flash glucose sensor kit (FreeStyle Mikhail 2 Sensor) kit Use as instructed    FreeStyle Mikhail reader (FreeStyle Mikhail 2 [place]) misc Use as instructed    hydroxychloroquine (PLAQUENIL) 200 mg, oral, Daily    ibuprofen 600 mg, Every 6 hours PRN    latanoprost (Xalatan) 0.005 % ophthalmic solution INSTILL 1 DROP INTO EACH EYE ONCE DAILY AT NIGHT    levothyroxine (SYNTHROID, LEVOXYL) 100 mcg, oral, Daily    losartan (COZAAR) 50 mg, oral, 2 times daily    magnesium oxide 400 mg, oral, Daily    metFORMIN (GLUCOPHAGE) 1,000 mg, oral, 2 times daily (morning and late afternoon), Take with morning and evening meal.    metoprolol tartrate (LOPRESSOR) 50 mg, oral, 2 times daily    Mounjaro 5 mg, subcutaneous, Every 7 days      Vitals  BP Readings from Last 2 Encounters:   10/21/24 118/73   09/24/24 122/68     BMI Readings from Last 1 Encounters:   10/21/24 35.77 kg/m²      Labs  A1C  Lab Results   Component Value Date    HGBA1C 12.4 (A) 09/24/2024    HGBA1C 9.6 (A) 05/30/2024    HGBA1C 8.4 (A) 11/30/2023     BMP  Lab Results   Component Value Date    CALCIUM 10.1 09/24/2024     09/24/2024    K 4.9 09/24/2024    CO2 22 09/24/2024     09/24/2024    BUN 30 (H) 09/24/2024    CREATININE 0.67 09/24/2024    EGFR >90 09/24/2024     LFTs  Lab Results   Component Value  Date    ALT 43 09/24/2024    AST 43 (H) 09/24/2024    ALKPHOS 109 09/24/2024    BILITOT 0.7 09/24/2024     FLP  Lab Results   Component Value Date    TRIG 331 (H) 09/24/2024    CHOL 119 09/24/2024    LDLF 70 08/08/2022    LDLCALC 21 09/24/2024    HDL 31.8 09/24/2024     Urine Microalbumin  Lab Results   Component Value Date    MICROALBCREA 383.7 (H) 06/18/2024     Weight Management  Wt Readings from Last 3 Encounters:   10/21/24 94.5 kg (208 lb 6.4 oz)   09/24/24 93.3 kg (205 lb 9.6 oz)   06/24/24 96.8 kg (213 lb 6.4 oz)      There is no height or weight on file to calculate BMI.     Assessment/Plan   Problem List Items Addressed This Visit       Type 2 diabetes mellitus without complication, without long-term current use of insulin (Multi)     Patient's goal A1c is < 7%.  Is pt at goal? No, 12.4  Patient's SMBGs are around 140 to 150 per patient, was in 200s while sick. Patient reporting better tolerance of 5 mg Mounjaro.  Rationale for plan: Continue dose of Mounjaro.    Medication Changes:  CONTINUE:  Farxiga 10 mg daily  Metformin 1000 mg twice daily  Mounjaro 5 mg once weekly    Future considerations  Testing for c peptides                Clinical Pharmacist follow-up: 12/17 at 940, Telehealth visit    Continue all meds under the continuation of care with the referring provider and clinical pharmacy team.    Thank you,  Noris Pastor, PharmD, [facility]  Clinical Pharmacy Specialist  [phone]    Verbal consent to manage patient's drug therapy was obtained from the patient. They were informed they may decline to participate or withdraw from participation in pharmacy services at any time.

## 2024-12-03 NOTE — ASSESSMENT & PLAN NOTE
Patient's goal A1c is < 7%.  Is pt at goal? No, 12.4  Patient's SMBGs are around 140 to 150 per patient, was in 200s while sick. Patient reporting better tolerance of 5 mg Mounjaro.  Rationale for plan: Continue dose of Mounjaro.    Medication Changes:  CONTINUE:  Farxiga 10 mg daily  Metformin 1000 mg twice daily  Mounjaro 5 mg once weekly    Future considerations  Testing for c peptides

## 2024-12-03 NOTE — PROGRESS NOTES
"Subjective   Patient ID: Yolanda Zabala is a 74 y.o. female who presents for Diabetes.    Diabetes  Pertinent negatives for hypoglycemia include no headaches. Pertinent negatives for diabetes include no chest pain and no fatigue.      A1c much better- now 7.6 from 12.5 in September. Lost 6 lbs in last month as well, appetite controlled with mounjaro. On 5mg. Had some diarrhea but that's better.      Review of Systems   Constitutional:  Negative for chills, fatigue and fever.   HENT:  Positive for congestion and sinus pain. Negative for sore throat.    Eyes:  Negative for visual disturbance.   Respiratory:  Positive for cough. Negative for shortness of breath.    Cardiovascular:  Negative for chest pain.   Gastrointestinal:  Negative for abdominal pain, constipation, diarrhea, nausea and vomiting.   Genitourinary:  Negative for dysuria and frequency.   Skin:  Negative for rash.   Neurological:  Negative for headaches.       Objective   /66   Pulse 66   Temp 36.4 °C (97.5 °F)   Ht 1.626 m (5' 4\")   Wt 91.8 kg (202 lb 6.4 oz)   SpO2 96%   BMI 34.74 kg/m²     Physical Exam  Vitals reviewed.   Constitutional:       General: She is not in acute distress.     Appearance: Normal appearance.   HENT:      Head: Normocephalic.      Right Ear: Tympanic membrane normal.      Left Ear: Tympanic membrane normal.      Nose: Congestion present.      Mouth/Throat:      Mouth: Mucous membranes are dry.      Pharynx: Oropharynx is clear. Posterior oropharyngeal erythema present.   Eyes:      Extraocular Movements: Extraocular movements intact.      Conjunctiva/sclera: Conjunctivae normal.   Cardiovascular:      Rate and Rhythm: Normal rate and regular rhythm.      Pulses: Normal pulses.      Heart sounds: No murmur heard.  Pulmonary:      Effort: Pulmonary effort is normal.      Breath sounds: Wheezing present. No rhonchi.   Abdominal:      General: Bowel sounds are normal. There is no distension.      Palpations: Abdomen " is soft.      Tenderness: There is no abdominal tenderness. There is no rebound.   Musculoskeletal:      Right lower leg: No edema.      Left lower leg: No edema.   Lymphadenopathy:      Cervical: Cervical adenopathy present.   Skin:     General: Skin is warm.      Coloration: Skin is not jaundiced.   Neurological:      General: No focal deficit present.      Mental Status: She is alert and oriented to person, place, and time.      Cranial Nerves: No cranial nerve deficit.      Gait: Gait normal.   Psychiatric:         Mood and Affect: Mood normal.         Assessment/Plan   Problem List Items Addressed This Visit             ICD-10-CM    Benign essential HTN I10    Hyperlipidemia E78.5    Hypothyroidism, adult - Primary E03.9    Type 2 diabetes mellitus without complication, without long-term current use of insulin (Multi) E11.9    Relevant Orders    POCT glycosylated hemoglobin (Hb A1C) manually resulted (Completed)    Obesity, morbid (Multi) E66.01     Other Visit Diagnoses         Codes    Bronchitis     J40    Relevant Medications    doxycycline (Vibra-Tabs) 100 mg tablet

## 2024-12-09 ENCOUNTER — PATIENT OUTREACH (OUTPATIENT)
Dept: CARE COORDINATION | Facility: CLINIC | Age: 74
End: 2024-12-09
Payer: MEDICARE

## 2024-12-09 NOTE — PROGRESS NOTES
Chart reviewed.  Latasha had an appointment with her PCP on 12/3/2024.  Noted A1C now down to 7.6 from 12.4 to months ago.  Outreach to Latasha this date with her having appointment with RD on 12/13 and Pharmacy meeting as well virtual.  Reviewed resources and thought of going to the [facility].  Care plan updated.  Reviewed all appointments with Latasha as well.  She has our # [phone].  We will remain available and monitor.

## 2024-12-12 ENCOUNTER — TELEPHONE (OUTPATIENT)
Dept: PRIMARY CARE | Facility: CLINIC | Age: 74
End: 2024-12-12
Payer: MEDICARE

## 2024-12-12 NOTE — TELEPHONE ENCOUNTER
Patient just finished Doxycycline on Tuesday. She states that she still has the sniffles. When she is blowing her nose there is clear drainage. What can she take to help dry this up? Please advise.

## 2024-12-13 ENCOUNTER — APPOINTMENT (OUTPATIENT)
Dept: NUTRITION | Facility: CLINIC | Age: 74
End: 2024-12-13
Payer: MEDICARE

## 2024-12-13 DIAGNOSIS — E11.9 TYPE 2 DIABETES MELLITUS WITHOUT COMPLICATION, WITHOUT LONG-TERM CURRENT USE OF INSULIN (MULTI): Primary | ICD-10-CM

## 2024-12-13 PROCEDURE — 97802 MEDICAL NUTRITION INDIV IN: CPT | Performed by: DIETITIAN, REGISTERED

## 2024-12-13 NOTE — PROGRESS NOTES
Nutrition Initial Assessment:     Yolanda Zabala is a 74 y.o. female being seen virtually, as a phone call only who was referred by Dr. Lincoln on 11/12/24 for   1. Type 2 diabetes mellitus without complication, without long-term current use of insulin (Multi)          Nutrition Assessment    Patient Active Problem List   Diagnosis    Benign essential HTN    Restrictive lung disease    Hyperlipidemia    Hypothyroidism, adult    Type 2 diabetes mellitus without complication, without long-term current use of insulin (Multi)    Fatty liver disease, nonalcoholic    Lupus    Unsteady    Lung nodules    Right bundle branch block (RBBB) on electrocardiography    SIMRAN on CPAP    Obesity, morbid (Multi)    Pulmonary fibrosis, unspecified (Multi)    High C-reactive protein    History of cataract    Seizure (Multi)     Nutrition History:  Food & Nutrition History: Patient is having this visit to discuss diet with diabetes. She also said she has diet restrictions for her kidneys such as avoiding salty foods and red meat. She prefers red meat or dark meat when consuming poultry, although she doesn't describe consuming large portions of meat.     -- Food Allergies: none; Food Intolerances: some     -- Vitamin/mineral intake: Magnesium     -- Prescription medications: Mounjaro, metformin     -- GI Symptoms: diarrhea; Occurring >2 weeks? intermittent (since starting GLP-1)     -- Physical Activity: She had been going to exercises at her foot doctor's office, but has been away from it for about a week due to having an illness.;      Diet Recall:     -- Meal 1: Breakfast: scrambled eggs, coffee.     -- Meal 2: Skips lunch. Supper: hash brown potatoes with ground beef w/ sloppy brien sauce (it didn't taste good so she didn't eat much, ate it without bread because she didn't have bread in the house. Sometimes has a bedtime snack - about 1.5 cups cheerios, some 2% milk     -- Food Variety: Absent (limited variety due to food  "insecurity)     -- Fluid Intake: coffee in the morning with zero-sugar creamer. Mostly water. Sometimes peppermint tea. Ginger ale only when feeling sick. Diet cranberry juice.     -- Oral Nutrition Supplement Use:  (none)     -- Energy Intake: Fair 50-75 % (Appetite has been lower since starting GLP-1 injections. \"nothing looks good.\" She said she eats better at social events / going to nephew's house.)    Food Preparation:     -- Cooking: Patient     -- Grocery Shopping: Patient     -- Dining Out: Rare to None (goes to [place] lunch at [employer], brings danita slaw or pea salad)    Food Security/Insecurity: Food / Nutrition Program Participation:  $73/month from two cards for supplemental groceries (SNAP $23, insurance $50). She tried to participate in senior farmer's voucher last summer but the [employer] didn't take the debit card - it went to waste. She has also visited a food pantry but hasn't found much healthy foods at the pantries. She doesn't have a car at this time - uses [employer] / [place] department on aging for transportation - if she were to use this for food pantry she'd have to be responsible for carrying the groceries on the vehicle herself. Her friend takes her grocery shopping weekly.     Anthropometrics:    Weight History:   Daily Weight  12/03/24 : 91.8 kg (202 lb 6.4 oz)  10/21/24 : 94.5 kg (208 lb 6.4 oz)  09/24/24 : 93.3 kg (205 lb 9.6 oz)  06/24/24 : 96.8 kg (213 lb 6.4 oz)  05/30/24 : 98.7 kg (217 lb 9.6 oz)  02/26/24 : 98.4 kg (217 lb)  01/22/24 : 98.4 kg (217 lb)  11/30/23 : 96.4 kg (212 lb 9.6 oz)  11/22/23 : 97.2 kg (214 lb 3.2 oz)  09/25/23 : 98.6 kg (217 lb 6.4 oz)    Weight Change %:  Weight History / % Weight Change: 15# / 7% weight loss in about 7 months.    Nutrition Focused Physical Exam Findings:  Performed/Deferred: Deferred due to be being virtual visit    Nutrition Significant Labs:  RFP trend:   Recent Labs     09/24/24  1552 06/18/24  1058 01/08/24  1118 " "10/27/20  1300 09/25/20  1228   GLUCOSE 122* 273* 181*   < > 95    139 141   < > 141   K 4.9 4.7 5.3   < > 5.2    100 102   < > 106   CO2 22 26 27   < > 27   ANIONGAP 18 18 17   < > 13   BUN 30* 34* 26*   < > 35*   CREATININE 0.67 1.01 0.99   < > 0.93   EGFR >90 59* 61   < >  --    CALCIUM 10.1 10.6* 10.1   < > 9.9   PHOS  --  4.0 4.0  --  3.7   ALBUMIN 4.1 4.4 3.9   < > 4.4    < > = values in this interval not displayed.     Noted she also has CKD per nephrology note June 2024. Serum K+ was elevated (5.5 in 2022), and has been borderline high at times (5.3 in January 2024). Last two values were WNL. Serum phosphorus has not been elevated.     , Lipid Panel trend:    Recent Labs     09/24/24  1552 11/30/23  1056 08/08/22  0912 09/21/21  0909   CHOL 119 122 154 164   HDL 31.8 39.8 39.5* 47.7   LDLCALC 21 49  --   --    LDLF  --   --  70 78   VLDL 66* 34 44* 38   TRIG 331* 168* 222* 190*   , Liver trend:   Recent Labs     09/24/24  1552 11/30/23  1056 08/11/23  1440   ALKPHOS 109 105 138*   AST 43* 26 54*   ALT 43 45 61*   BILITOT 0.7 0.7 0.6   , Hgb A1c trend:   Recent Labs     12/03/24  1514   HGBA1C 7.6*   , Vit D:   Lab Results   Component Value Date    VITD25 34 01/08/2024    , Iron Panel:   Lab Results   Component Value Date    IRON 56 08/11/2023    TIBC 420 08/11/2023    FERRITIN 333 (H) 08/11/2023    , Vit B12: No results found for: \"TWJTBYKN56\" , and Folate: No results found for: \"FOLATE\"     Medications:    Current Outpatient Medications:     albuterol 90 mcg/actuation inhaler, Inhale 2 puffs every 6 hours if needed for wheezing., Disp: 18 g, Rfl: 3    allopurinol (Zyloprim) 300 mg tablet, Take 1 tablet (300 mg) by mouth once daily., Disp: 90 tablet, Rfl: 3    atorvastatin (Lipitor) 20 mg tablet, Take 1 tablet (20 mg) by mouth once daily., Disp: 90 tablet, Rfl: 3    blood sugar diagnostic strip, Please prescribe strips compatible with Relion Premier Classic, Disp: 100 strip, Rfl: 3    cetirizine " (ZyrTEC) 10 mg tablet, Take 1 tablet (10 mg) by mouth once daily., Disp: 30 tablet, Rfl: 11    dapagliflozin propanediol (Farxiga) 10 mg, Take two  5 mg tablets orally daily, Disp: 30 tablet, Rfl: 11    flash glucose sensor kit (FreeStyle Mikhail 2 Sensor) kit, Use as instructed, Disp: 6 each, Rfl: 3    FreeStyle Mikhail reader (FreeStyle Mikhail 2 [place]) misc, Use as instructed, Disp: 1 each, Rfl: 0    hydroxychloroquine (Plaquenil) 200 mg tablet, Take 1 tablet (200 mg) by mouth once daily., Disp: 30 tablet, Rfl: 11    ibuprofen 600 mg tablet, Take 1 tablet (600 mg) by mouth every 6 hours if needed for mild pain (1 - 3)., Disp: , Rfl:     latanoprost (Xalatan) 0.005 % ophthalmic solution, INSTILL 1 DROP INTO EACH EYE ONCE DAILY AT NIGHT, Disp: , Rfl:     levothyroxine (Synthroid, Levoxyl) 100 mcg tablet, Take 1 tablet (100 mcg) by mouth once daily., Disp: 90 tablet, Rfl: 3    losartan (Cozaar) 50 mg tablet, Take 1 tablet (50 mg) by mouth 2 times a day., Disp: 180 tablet, Rfl: 3    magnesium oxide 400 mg magnesium capsule, Take 1 capsule (400 mg) by mouth once daily., Disp: 30 capsule, Rfl: 11    metFORMIN (Glucophage) 1,000 mg tablet, Take 1 tablet (1,000 mg) by mouth 2 times daily (morning and late afternoon). Take with morning and evening meal., Disp: 180 tablet, Rfl: 3    metoprolol tartrate (Lopressor) 100 mg tablet, Take 0.5 tablets (50 mg) by mouth 2 times a day., Disp: 180 tablet, Rfl: 3    tirzepatide (Mounjaro) 5 mg/0.5 mL pen injector, Inject 5 mg under the skin every 7 days., Disp: 2 mL, Rfl: 3    Nutrition Diagnosis   Malnutrition Diagnosis  Patient has Malnutrition Diagnosis: No    Nutrition Diagnosis  Patient has Nutrition Diagnosis: Yes  Diagnosis Status (1): New  Nutrition Diagnosis 1: Food and nutrition related knowledge deficit  Related to (1): lack of adequate exposure to nutrition-related information  As Evidenced by (1): patient asks questions about diet to manage diabetes and CKD     Nutrition  Interventions/Recommendations   Nutrition Prescription: Individualized Nutrition Prescription Provided for : Carbohydrate-controlled low sodium diet    Nutrition Interventions:   Food and Nutrient Delivery: Meals & Snacks: Modify Composition of Meals/Snacks       Nutrition Education:   Nutrition Education Content:      Nutrition Education Topics Discussed:   Utilize the Plate Method at meals in order to balance the types and amounts of food on the plate.   - half of the plate full of non-starchy vegetables. These foods contribute very little carbohydrate to the plate, and they add fiber to the meal. Salad, carrots, bell peppers, zucchini, broccoli, cauliflower, asparagus, radishes, carrots and green beans are a few examples of these foods. They can be served cooked or raw.    - one quarter of plate including protein foods. Examples can include meat, nuts, seeds, cheese, cottage cheese, nut butter, fish, seafood, tofu, and edamame.    - one quarter of the plate including carbohydrate foods. These foods include grains, fruit, legumes, starchy vegetables, milk and yogurt. Aim to eat at least half of the daily grains as whole grains.    2. Discussed that continuing to adhere to a low sodium diet to promote kidney health. Reviewed foods that are high in sodium, she is already avoiding these. Encouraged seasoning food without salt.     3. Discussed evening snack, advised looking at food labels to limit carbohydrate intake in the evening to no more than 30 grams. She likes to have cereal and milk - advised she can use her Mikhail to check how her blood glucose level responds to eating cereal, and if it is >180 mg/dL at 2 hours after eating cereal she can try a lower carbohydrate snack at night, since her cereal with milk is likely closer to 35-40 grams carbohydrate. Encouraged her to include some high protein food at an evening snack to make it satisfying - such as eating less cereal and having a little unsalted nuts or some  Swiss cheese.     4. I will send her locations of [employer] market at  and if she thinks any of them are reasonable distance for her to visit, will work on getting a referral to the market from her PCP.     Educational Handouts Provided: ADA SMBG, ADA Snacks, RPG Plate Method with Diabetes, and RPG Simple Meal Ideas, cookbooks - food for the soul, good food on a tight budget      Readiness to Change : Good  Level of Understanding : Good  Anticipated Compliant : Good         Nutrition Monitoring and Evaluation   Food/Nutrient Related History Monitoring  Monitoring and Evaluation Plan: Meal/snack pattern, Mineral/element intake  Meal/Snack Pattern: Estimated meal and snack pattern  Criteria: she will use the Plate Method to guide amount/type of foods on her plate  Mineral/Element Intake: Measured mineral/element intake  Criteria: She will continue to cook without added salt and purchase foods low in sodium when she has the opportunity.                             Follow Up: 1/17 at 10 phone only    Time Spent  Prep time on day of patient encounter: 5 minutes  Time spent directly with patient, family or caregiver: 60 minutes  Additional Time Spent on Patient Care Activities: 0 minutes  Documentation Time: 10 minutes  Other Time Spent: 5 minutes  Total: 80 minutes

## 2024-12-13 NOTE — PATIENT INSTRUCTIONS
Utilize the Plate Method at meals in order to balance the types and amounts of food on the plate.   - half of the plate full of non-starchy vegetables. These foods contribute very little carbohydrate to the plate, and they add fiber to the meal. Salad, carrots, bell peppers, zucchini, broccoli, cauliflower, asparagus, radishes, carrots and green beans are a few examples of these foods. They can be served cooked or raw.    - one quarter of plate including protein foods. Examples can include meat, nuts, seeds, cheese, cottage cheese, nut butter, fish, seafood, tofu, and edamame.    - one quarter of the plate including carbohydrate foods. These foods include grains, fruit, legumes, starchy vegetables, milk and yogurt. Aim to eat at least half of the daily grains as whole grains.    2. Discussed that continuing to adhere to a low sodium diet to promote kidney health. Reviewed foods that are high in sodium, she is already avoiding these. Encouraged seasoning food without salt.     3. Discussed evening snack, advised looking at food labels to limit carbohydrate intake in the evening to no more than 30 grams. She likes to have cereal and milk - advised she can use her Mikhail to check how her blood glucose level responds to eating cereal, and if it is >180 mg/dL at 2 hours after eating cereal she can try a lower carbohydrate snack at night, since her cereal with milk is likely closer to 35-40 grams carbohydrate. Encouraged her to include some high protein food at an evening snack to make it satisfying - such as eating less cereal and having a little unsalted nuts or some Swiss cheese.       4. I will send her locations of [employer] at  and if she thinks any of them are reasonable distance for her to visit, will work on getting a referral to the market from her PCP.

## 2024-12-17 ENCOUNTER — APPOINTMENT (OUTPATIENT)
Dept: PHARMACY | Facility: HOSPITAL | Age: 74
End: 2024-12-17
Payer: MEDICARE

## 2024-12-17 DIAGNOSIS — E11.9 TYPE 2 DIABETES MELLITUS WITHOUT COMPLICATION, WITHOUT LONG-TERM CURRENT USE OF INSULIN (MULTI): ICD-10-CM

## 2024-12-17 NOTE — PROGRESS NOTES
Clinical Pharmacy Appointment    Patient ID: Yolanda Zabala is a 74 y.o. female who presents for Diabetes.    Pt is here for Follow Up appointment.     Referring Provider/PCP: Caridad Lincoln*  Last visit with PCP: 12/3/24   Next visit with PCP: 5/6/25    Subjective   Interval History  Has been sick with URI for last few weeks.    HPI  DIABETES MELLITUS TYPE II:    Diagnosed with diabetes: at least 2018. Known diabetic complications: None.  Does patient follow with Endocrinology: No  Last optometry exam: 10/22/24  Most recent visit in Podiatry: Every 10 weeks     Current diabetic medications include:  Farxiga 10 mg daily  Metformin 1000 mg twice daily  Mounjaro 5 weekly on Monday x 4    Clarifications to above regimen: none  Adverse Effects: None    Past diabetic medications include:  Actos 30 mg  Glimepiride 4 mg daily    Glucose Readings:  Glucometer/CGM Type: Freestyle 2 (Uses reader)  Patient tests BG continuously times per day    Current home BG readings: 117 this morning  Previous home BG readings:   Went up because sick, currently 148 after eating.   140-150    Any episodes of hypoglycemia? No,   .  Did patient treat episode of hypoglycemia appropriately? N/A  Does the patient have a prescription for ready-to-use Glucagon? Not on insulin  Does pt have proteinuria? Yes    Lifestyle:  Diet: Unknown meals/day.  BK: Unknown  LN: Unknown  DN: Unknown  Snacks: Unknown  Drinks: Unknown  Physical Activity: Unknown    Secondary Prevention:  Statin? Yes  ACE-I/ARB? Yes  Aspirin? No    Pertinent PMH Review:  PMH of Pancreatitis: No  PMH of Retinopathy: No  PMH of Urinary Tract Infections: No  PMH of MTC: No    Medication Reconciliation:  Changed:   Added:   Discontinued:     Drug Interactions  No relevant drug interactions were noted.    Medication System Management  Patients preferred pharmacy: [facility]  Adherence/Organization: None  Affordability/Accessibility: None      Objective   Allergies   Allergen  Reactions    Ace Inhibitors Unknown    Bacitracin Unknown    Penicillins Unknown     can't remember    Phenytoin Unknown    Sulfamethoxazole-Trimethoprim Unknown     Social History     Social History Narrative    Not on file      Medication Review  Current Outpatient Medications   Medication Instructions    albuterol 90 mcg/actuation inhaler 2 puffs, inhalation, Every 6 hours PRN    allopurinol (ZYLOPRIM) 300 mg, oral, Daily    atorvastatin (LIPITOR) 20 mg, oral, Daily    blood sugar diagnostic strip Please prescribe strips compatible with Relion Premier Classic    cetirizine (ZYRTEC) 10 mg, oral, Daily    dapagliflozin propanediol (Farxiga) 10 mg Take two  5 mg tablets orally daily    flash glucose sensor kit (FreeStyle Mikhail 2 Sensor) kit Use as instructed    FreeStyle Mikhail reader (FreeStyle Mikhail 2 [place]) misc Use as instructed    hydroxychloroquine (PLAQUENIL) 200 mg, oral, Daily    ibuprofen 600 mg, Every 6 hours PRN    latanoprost (Xalatan) 0.005 % ophthalmic solution INSTILL 1 DROP INTO EACH EYE ONCE DAILY AT NIGHT    levothyroxine (SYNTHROID, LEVOXYL) 100 mcg, oral, Daily    losartan (COZAAR) 50 mg, oral, 2 times daily    magnesium oxide 400 mg, oral, Daily    metFORMIN (GLUCOPHAGE) 1,000 mg, oral, 2 times daily (morning and late afternoon), Take with morning and evening meal.    metoprolol tartrate (LOPRESSOR) 50 mg, oral, 2 times daily    Mounjaro 5 mg, subcutaneous, Every 7 days      Vitals  BP Readings from Last 2 Encounters:   12/03/24 108/66   10/21/24 118/73     BMI Readings from Last 1 Encounters:   12/03/24 34.74 kg/m²      Labs  A1C  Lab Results   Component Value Date    HGBA1C 7.6 (A) 12/03/2024    HGBA1C 12.4 (A) 09/24/2024    HGBA1C 9.6 (A) 05/30/2024     BMP  Lab Results   Component Value Date    CALCIUM 10.1 09/24/2024     09/24/2024    K 4.9 09/24/2024    CO2 22 09/24/2024     09/24/2024    BUN 30 (H) 09/24/2024    CREATININE 0.67 09/24/2024    EGFR >90 09/24/2024     LFTs  Lab  Results   Component Value Date    ALT 43 09/24/2024    AST 43 (H) 09/24/2024    ALKPHOS 109 09/24/2024    BILITOT 0.7 09/24/2024     FLP  Lab Results   Component Value Date    TRIG 331 (H) 09/24/2024    CHOL 119 09/24/2024    LDLF 70 08/08/2022    LDLCALC 21 09/24/2024    HDL 31.8 09/24/2024     Urine Microalbumin  Lab Results   Component Value Date    MICROALBCREA 383.7 (H) 06/18/2024     Weight Management  Wt Readings from Last 3 Encounters:   12/03/24 91.8 kg (202 lb 6.4 oz)   10/21/24 94.5 kg (208 lb 6.4 oz)   09/24/24 93.3 kg (205 lb 9.6 oz)      There is no height or weight on file to calculate BMI.     Assessment/Plan   Problem List Items Addressed This Visit       Type 2 diabetes mellitus without complication, without long-term current use of insulin (Multi)     Patient's goal A1c is < 7%.  Is pt at goal? No, 7.6  Patient's SMBGs are 117 this morning. Patient reports bowel urgency after eating. Will try increasing fiber. Stay on 5 mg for 1 more month.  Rationale for plan: Continue dose of Mounjaro.    Medication Changes:  CONTINUE:  Farxiga 10 mg daily  Metformin 1000 mg twice daily  Mounjaro 5 mg once weekly           Relevant Orders    Referral to Clinical Pharmacy     Clinical Pharmacist follow-up: 1/14 at 940 am, Telehealth visit    Continue all meds under the continuation of care with the referring provider and clinical pharmacy team.    Thank you,  Noris Pastor, PharmD, [facility]  Clinical Pharmacy Specialist  [phone]    Verbal consent to manage patient's drug therapy was obtained from the patient. They were informed they may decline to participate or withdraw from participation in pharmacy services at any time.

## 2024-12-17 NOTE — Clinical Note
Patient doing well on Mounjaro. Due to still having some GI issues and fasting sugars reported as 117, will continue on 5 mg for now.

## 2024-12-17 NOTE — ASSESSMENT & PLAN NOTE
Patient's goal A1c is < 7%.  Is pt at goal? No, 7.6  Patient's SMBGs are 117 this morning. Patient reports bowel urgency after eating. Will try increasing fiber. Stay on 5 mg for 1 more month.  Rationale for plan: Continue dose of Mounjaro.    Medication Changes:  CONTINUE:  Farxiga 10 mg daily  Metformin 1000 mg twice daily  Mounjaro 5 mg once weekly

## 2024-12-19 ENCOUNTER — TELEPHONE (OUTPATIENT)
Dept: PRIMARY CARE | Facility: CLINIC | Age: 74
End: 2024-12-19
Payer: MEDICARE

## 2024-12-19 DIAGNOSIS — Z00.00 ROUTINE GENERAL MEDICAL EXAMINATION AT HEALTH CARE FACILITY: ICD-10-CM

## 2024-12-19 DIAGNOSIS — E11.9 TYPE 2 DIABETES MELLITUS WITHOUT COMPLICATION, WITHOUT LONG-TERM CURRENT USE OF INSULIN (MULTI): ICD-10-CM

## 2024-12-19 RX ORDER — LANCETS
EACH MISCELLANEOUS
Qty: 100 EACH | Refills: 3 | Status: SHIPPED | OUTPATIENT
Start: 2024-12-19

## 2024-12-19 RX ORDER — DEXTROSE 4 G
TABLET,CHEWABLE ORAL
Qty: 1 EACH | Refills: 0 | Status: SHIPPED | OUTPATIENT
Start: 2024-12-19

## 2024-12-19 NOTE — TELEPHONE ENCOUNTER
Pt called office wondering if she can just start checking her sugars with a finger stick instead of using her freestyle shaheed? She said she has been having too many issues with the freestyle and would just prefer to check manually. She stated she does not currently have a test kit. [facility]

## 2024-12-20 ENCOUNTER — TELEPHONE (OUTPATIENT)
Dept: PRIMARY CARE | Facility: CLINIC | Age: 74
End: 2024-12-20
Payer: MEDICARE

## 2024-12-20 ENCOUNTER — PATIENT OUTREACH (OUTPATIENT)
Dept: CARE COORDINATION | Facility: CLINIC | Age: 74
End: 2024-12-20
Payer: MEDICARE

## 2024-12-20 NOTE — PROGRESS NOTES
Noted that Latasha did have her appointment with Zabrina TAYLOR [facility] on 12/13/2024 Telehealth visit.  She also had a Televisit with [facility] on 12/17/2024. Outreach to Latasha this date on her listed #.  She shares that she did get a new Glucose Monitor at [facility] yesterday due to the many issues she was having with her Free Style Mikhail. Latasha plans to read the instructions today and check her BS with the goal of continuing to manage her diet and DM and get her A1C down.  [facility] referral discussed with [facility] being the closest and Latasha shares that this would be difficult for her to get to since she uses [employer] or a friend when she is able.  She is on SNAP $23 month as well as $50/month from her [employer] for [facility] or [facility].  Latasha did get a [place] gift from the [employer] for a [place] program and will go to her nephews for [employer].  Discussed attending the [facility] in the new year for socialization, lunch meal, and support.  Care Plan reviewed.  Latasha has our # [phone].  We will remain available and follow.

## 2024-12-24 ENCOUNTER — APPOINTMENT (OUTPATIENT)
Dept: NEPHROLOGY | Facility: CLINIC | Age: 74
End: 2024-12-24
Payer: MEDICARE

## 2025-01-02 ENCOUNTER — PATIENT OUTREACH (OUTPATIENT)
Dept: CARE COORDINATION | Facility: CLINIC | Age: 75
End: 2025-01-02
Payer: MEDICARE

## 2025-01-02 NOTE — PROGRESS NOTES
Chart reviewed.  VM left for Latasha on her listed #.  We will await a return phone call for further assessment.

## 2025-01-14 ENCOUNTER — PATIENT OUTREACH (OUTPATIENT)
Dept: CARE COORDINATION | Facility: CLINIC | Age: 75
End: 2025-01-14

## 2025-01-14 ENCOUNTER — APPOINTMENT (OUTPATIENT)
Dept: PHARMACY | Facility: HOSPITAL | Age: 75
End: 2025-01-14
Payer: MEDICARE

## 2025-01-14 DIAGNOSIS — E11.9 TYPE 2 DIABETES MELLITUS WITHOUT COMPLICATION, WITHOUT LONG-TERM CURRENT USE OF INSULIN (MULTI): ICD-10-CM

## 2025-01-14 NOTE — Clinical Note
Patient doing well on Mounjaro and Farxiga. Reports fasting sugars in the 140s. Will increase Mounjaro from 5 mg to 7.5 mg at this time.

## 2025-01-14 NOTE — PROGRESS NOTES
Chart reviewed. Noted that PharmD Noris Pastor did have a televisit this am with Latasha.  Outreach to Latasha on her listed # number.  She shares openly that she is doing well and is able to tell us all about her DM medications and her the plan.  She is also to have her next visit with Nutrition on 1/17/2025.  She plans on continuing to use her Mikhail CGM.  Changes noted by patient with her lifestyle with the hopes of managing her DM II better and she does have goals that she is meeting.  Not interested in MOW or going to the [facility] for socialization or meals.  Her friend does assist her with some driving to the stores and bank.  Care Plan reviewed and updated.  Latasha has our # [phone].  We will follow up and assist.

## 2025-01-14 NOTE — PROGRESS NOTES
Clinical Pharmacy Appointment    Patient ID: Yolanda Zabala is a 74 y.o. female who presents for Diabetes.    Pt is here for Follow Up appointment.     Referring Provider/PCP: Caridad Lincoln*  Last visit with PCP: 12/3/24   Next visit with PCP: 5/6/25    Subjective   Interval History  Still congested. Reports weight is down to 190-193 lbs.    HPI  DIABETES MELLITUS TYPE II:    Diagnosed with diabetes: at least 2018. Known diabetic complications: None.  Does patient follow with Endocrinology: No  Last optometry exam: 10/22/24  Most recent visit in Podiatry: Every 10 weeks     Current diabetic medications include:  Farxiga 10 mg daily  Metformin 1000 mg twice daily  Mounjaro 5 weekly on Monday    Clarifications to above regimen: none  Adverse Effects: Occasional bowel urgency    Past diabetic medications include:  Actos 30 mg  Glimepiride 4 mg daily    Glucose Readings:  Glucometer/CGM Type: Freestyle 2 (Uses reader)  Patient tests BG continuously times per day    Current home BG readings: around 140 this morning, Down to 98 overnight night  Previous home BG readings:   117 this morning  Went up because sick, currently 148 after eating.   140-150    Any episodes of hypoglycemia? No,   .  Did patient treat episode of hypoglycemia appropriately? N/A  Does the patient have a prescription for ready-to-use Glucagon? Not on insulin  Does pt have proteinuria? Yes    Lifestyle:  Diet: Unknown meals/day.  BK: Unknown  LN: Unknown  DN: Unknown  Snacks: Unknown  Drinks: Unknown  Physical Activity: Unknown    Secondary Prevention:  Statin? Yes  ACE-I/ARB? Yes  Aspirin? No    Pertinent PMH Review:  PMH of Pancreatitis: No  PMH of Retinopathy: No  PMH of Urinary Tract Infections: No  PMH of MTC: No    Medication Reconciliation:  Changed:   Added:   Discontinued:     Drug Interactions  No relevant drug interactions were noted.    Medication System Management  Patients preferred pharmacy: [facility]  Adherence/Organization:  None  Affordability/Accessibility: None      Objective   Allergies   Allergen Reactions    Ace Inhibitors Unknown    Bacitracin Unknown    Penicillins Unknown     can't remember    Phenytoin Unknown    Sulfamethoxazole-Trimethoprim Unknown     Social History     Social History Narrative    Not on file      Medication Review  Current Outpatient Medications   Medication Instructions    albuterol 90 mcg/actuation inhaler 2 puffs, inhalation, Every 6 hours PRN    allopurinol (ZYLOPRIM) 300 mg, oral, Daily    atorvastatin (LIPITOR) 20 mg, oral, Daily    blood sugar diagnostic strip Please prescribe strips compatible with one touch verio    blood-glucose meter misc Blood sugar checks bid , one touch verio    cetirizine (ZYRTEC) 10 mg, oral, Daily    dapagliflozin propanediol (Farxiga) 10 mg Take two  5 mg tablets orally daily    flash glucose sensor kit (FreeStyle Mikhail 2 Sensor) kit Use as instructed    hydroxychloroquine (PLAQUENIL) 200 mg, oral, Daily    ibuprofen 600 mg, Every 6 hours PRN    lancets misc Blood sugar checks bid    latanoprost (Xalatan) 0.005 % ophthalmic solution INSTILL 1 DROP INTO EACH EYE ONCE DAILY AT NIGHT    levothyroxine (SYNTHROID, LEVOXYL) 100 mcg, oral, Daily    losartan (COZAAR) 50 mg, oral, 2 times daily    magnesium oxide 400 mg, oral, Daily    metFORMIN (GLUCOPHAGE) 1,000 mg, oral, 2 times daily (morning and late afternoon), Take with morning and evening meal.    metoprolol tartrate (LOPRESSOR) 50 mg, oral, 2 times daily    Mounjaro 5 mg, subcutaneous, Every 7 days      Vitals  BP Readings from Last 2 Encounters:   12/03/24 108/66   10/21/24 118/73     BMI Readings from Last 1 Encounters:   12/03/24 34.74 kg/m²      Labs  A1C  Lab Results   Component Value Date    HGBA1C 7.6 (A) 12/03/2024    HGBA1C 12.4 (A) 09/24/2024    HGBA1C 9.6 (A) 05/30/2024     BMP  Lab Results   Component Value Date    CALCIUM 10.1 09/24/2024     09/24/2024    K 4.9 09/24/2024    CO2 22 09/24/2024      09/24/2024    BUN 30 (H) 09/24/2024    CREATININE 0.67 09/24/2024    EGFR >90 09/24/2024     LFTs  Lab Results   Component Value Date    ALT 43 09/24/2024    AST 43 (H) 09/24/2024    ALKPHOS 109 09/24/2024    BILITOT 0.7 09/24/2024     FLP  Lab Results   Component Value Date    TRIG 331 (H) 09/24/2024    CHOL 119 09/24/2024    LDLF 70 08/08/2022    LDLCALC 21 09/24/2024    HDL 31.8 09/24/2024     Urine Microalbumin  Lab Results   Component Value Date    MICROALBCREA 383.7 (H) 06/18/2024     Weight Management  Wt Readings from Last 3 Encounters:   12/03/24 91.8 kg (202 lb 6.4 oz)   10/21/24 94.5 kg (208 lb 6.4 oz)   09/24/24 93.3 kg (205 lb 9.6 oz)      There is no height or weight on file to calculate BMI.     Assessment/Plan   Problem List Items Addressed This Visit       Type 2 diabetes mellitus without complication, without long-term current use of insulin (Multi)     Patient's goal A1c is < 7%.  Is pt at goal? No, 7.6 , However patient reports home health through insurance got an A1C of 6.1 last week at home visit.  Patient's SMBGs are  140 this morning, down to 98 overnight. Patient reports eating a snack if she sees sugars that low because she is scared they will drop even lower over night. Patient used last 5 mg pen, and has 7.5 at home already.  Rationale for plan: Increase dose of Mounjaro.    Medication Changes:  CONTINUE:  Farxiga 10 mg daily  Metformin 1000 mg twice daily  INCREASE  Mounjaro 5 to 7.5 mg once weekly         Relevant Orders    Referral to Clinical Pharmacy       Clinical Pharmacist follow-up: 2/4 at 940, Telehealth visit    Continue all meds under the continuation of care with the referring provider and clinical pharmacy team.    Thank you,  Noris Pastor, PharmD, [facility]  Clinical Pharmacy Specialist  [phone]    Verbal consent to manage patient's drug therapy was obtained from the patient. They were informed they may decline to participate or withdraw from participation in pharmacy  services at any time.

## 2025-01-14 NOTE — ASSESSMENT & PLAN NOTE
Patient's goal A1c is < 7%.  Is pt at goal? No, 7.6 , However patient reports home health through insurance got an A1C of 6.1 last week at home visit.  Patient's SMBGs are  140 this morning, down to 98 overnight. Patient reports eating a snack if she sees sugars that low because she is scared they will drop even lower over night. Patient used last 5 mg pen, and has 7.5 at home already.  Rationale for plan: Increase dose of Mounjaro.    Medication Changes:  CONTINUE:  Farxiga 10 mg daily  Metformin 1000 mg twice daily  INCREASE  Mounjaro 5 to 7.5 mg once weekly

## 2025-01-16 NOTE — PROGRESS NOTES
Nutrition Follow Up Assessment:     Patient Yolanda Zabala is a 74 y.o. female being seen via virtual visit, phone call only who was initially referred by Dr. Lincoln  on 11/12/24 for   1. Type 2 diabetes mellitus without complication, without long-term current use of insulin (Multi)          Last nutrition visit was completed on 12/13/24 with previous goals set by RDN & pt:   She will use the Plate Method to guide amount/type of foods on her plate  She will continue to cook without added salt and purchase foods low in sodium when she has the opportunity.    Nutrition Assessment      Nutrition History:  Food & Nutrition History: She said she reviewed the information sent and has been working on implimenting the portion guidelines.  Food Allergies: none  Food Intolerances: some  Vitamin/mineral intake:    Magnesium  Prescription medications: Prescription Medication Use: Mounjaro, metformin. She continues to use Freestyle Mikhail 2.  GI Symptoms: diarrhea; Occurring >2 weeks? intermittent    Diet Recall:  Meal 1: B: omelet (eggs from a family at [facility], $2.50 /dozen) with veggies and ham in it  Meal 2: Skips lunch. Dinner: made cabbage rolls recently, they lasted a couple of meals. Also made vegetable soup recently. She's been purchasing sugar free dressings, but can't find sugar  free Vietnamese at this time.  Snacks: dry roasted, unsalted peanuts. A friend at [employer] gave her [employer] candy bar for [place], she thought it tastes ok. She made a small quesadilla in the microwave with a corn tortilla and some cheese. Sometimes cereal before bed - she said her blood sugars are stable after eating cereal.  Food Variety: Present  Energy Intake: Fair 50-75 % (related to taking Mounjaro. Said she has an appetite before bed so she's been eating an HS snack)      Food Preparation:  Cooking: Patient  Grocery Shopping: Patient (a friend has been taking her to the grocery store weekly)  Dining Out: Rare to None (goes to  [facility] at [facility])    Physical Activity:   didn't discuss exercise today    Food Security/Insecurity: Food / Nutrition Program Participation:  (receives SNAP / insurance money for groceries each month.)    Anthropometrics:  No new weight    Weight History:   Daily Weight  12/03/24 : 91.8 kg (202 lb 6.4 oz)  10/21/24 : 94.5 kg (208 lb 6.4 oz)  09/24/24 : 93.3 kg (205 lb 9.6 oz)  06/24/24 : 96.8 kg (213 lb 6.4 oz)  05/30/24 : 98.7 kg (217 lb 9.6 oz)  02/26/24 : 98.4 kg (217 lb)  01/22/24 : 98.4 kg (217 lb)  11/30/23 : 96.4 kg (212 lb 9.6 oz)  11/22/23 : 97.2 kg (214 lb 3.2 oz)  09/25/23 : 98.6 kg (217 lb 6.4 oz)    Nutrition Focused Physical Exam Findings:  Deferred, appointment is either virtual or phone only    Nutrition Significant Labs:  CBC Trend:   Recent Labs     09/24/24  1552 11/30/23  1056 08/11/23  1440 03/22/21  1215 01/26/21  1115 09/25/20  1228 09/19/20  1557   WBC 7.8 9.0 10.7   < > 6.2   < > 8.9   NRBC 0.0 0.0  --   --  0.0   < > 0   RBC 4.27 4.63 4.61   < > 4.07   < > 4.07   HGB 13.0 13.9 13.7   < > 12.3   < > 12.6   HCT 39.9 42.8 42.7   < > 41.1   < > 39.5   MCV 93 92 93   < > 101*   < > 97.1   MCH 30.4 30.0  --   --   --   --  31.0   MCHC 32.6 32.5 32.1   < > 29.9*   < > 31.9   RDW 14.4 14.0 14.5   < > 14.8*   < >  --     207 310   < > 240   < > 242    < > = values in this interval not displayed.   , RFP + Serum Mag Trend:   Recent Labs     09/24/24  1552 06/18/24  1058 01/08/24  1118 10/27/20  1300 09/25/20  1228 09/22/20  1236   GLUCOSE 122* 273* 181*   < > 95 55*    139 141   < > 141 142   K 4.9 4.7 5.3   < > 5.2 5.0    100 102   < > 106 107   CO2 22 26 27   < > 27 27   ANIONGAP 18 18 17   < > 13 13   BUN 30* 34* 26*   < > 35* 35*   CREATININE 0.67 1.01 0.99   < > 0.93 0.94   EGFR >90 59* 61   < >  --   --    CALCIUM 10.1 10.6* 10.1   < > 9.9 10.1   PHOS  --  4.0 4.0  --  3.7  --    ALBUMIN 4.1 4.4 3.9   < > 4.4  --    MG 1.56* 1.66  --   --   --  1.59*    < > = values in this  "interval not displayed.   , LFT Trend:   Recent Labs     09/24/24  1552 06/18/24  1058 01/08/24  1118 11/30/23  1056 11/30/23  1056 08/11/23  1440 04/19/23  1211 03/20/23  1053 11/01/21  0942 07/27/21  1024   ALBUMIN 4.1 4.4 3.9   < > 4.2 4.2   < > 3.8   < > 4.2   BILITOT 0.7  --   --   --  0.7 0.6   < > 0.7   < > 0.4   BILIDIR  --   --   --   --   --  0.1  --  0.2  --  0.1   ALKPHOS 109  --   --   --  105 138*   < > 144*   < > 84   ALT 43  --   --   --  45 61*   < > 55*   < > 36   AST 43*  --   --   --  26 54*   < > 52*   < > 27   PROT 7.2  --   --   --  7.7 8.6*   < > 7.9   < > 7.4    < > = values in this interval not displayed.   , DM Specific Labs Trend (includes HgbA1C):   Recent Labs     12/03/24  1514 09/24/24  1514 05/30/24  1010   HGBA1C 7.6* 12.4* 9.6*   , Lipid Panel Trend:    Recent Labs     09/24/24  1552 11/30/23  1056 08/08/22  0912 09/21/21  0909   CHOL 119 122 154 164   HDL 31.8 39.8 39.5* 47.7   LDLCALC 21 49  --   --    LDLF  --   --  70 78   VLDL 66* 34 44* 38   TRIG 331* 168* 222* 190*   , Iron Panel + Serum Ferritin Trend:   Recent Labs     08/11/23  1440   IRON 56   TIBC 420   IRONSAT 13*   FERRITIN 333*   , Vitamin B12: No results found for: \"UIPBHFNJ40\" , Folate: No results found for: \"FOLATE\" , and Vitamin D:   Lab Results   Component Value Date    VITD25 34 01/08/2024      Medications:  Current Outpatient Medications   Medication Instructions    albuterol 90 mcg/actuation inhaler 2 puffs, inhalation, Every 6 hours PRN    allopurinol (ZYLOPRIM) 300 mg, oral, Daily    atorvastatin (LIPITOR) 20 mg, oral, Daily    blood sugar diagnostic strip Please prescribe strips compatible with one touch verio    blood-glucose meter misc Blood sugar checks bid , one touch verio    cetirizine (ZYRTEC) 10 mg, oral, Daily    dapagliflozin propanediol (Farxiga) 10 mg Take two  5 mg tablets orally daily    flash glucose sensor kit (FreeStyle Mikhail 2 Sensor) kit Use as instructed    hydroxychloroquine " (PLAQUENIL) 200 mg, oral, Daily    ibuprofen 600 mg, Every 6 hours PRN    lancets misc Blood sugar checks bid    latanoprost (Xalatan) 0.005 % ophthalmic solution INSTILL 1 DROP INTO EACH EYE ONCE DAILY AT NIGHT    levothyroxine (SYNTHROID, LEVOXYL) 100 mcg, oral, Daily    losartan (COZAAR) 50 mg, oral, 2 times daily    magnesium oxide 400 mg, oral, Daily    metFORMIN (GLUCOPHAGE) 1,000 mg, oral, 2 times daily (morning and late afternoon), Take with morning and evening meal.    metoprolol tartrate (LOPRESSOR) 50 mg, oral, 2 times daily    Mounjaro 5 mg, subcutaneous, Every 7 days        Nutrition Diagnosis   Malnutrition Diagnosis  Patient has Malnutrition Diagnosis: No    Nutrition Diagnosis  Patient has Nutrition Diagnosis: Yes  Diagnosis Status (1): Ongoing  Nutrition Diagnosis 1: Food and nutrition related knowledge deficit  Related to (1): lack of adequate exposure to nutrition-related information  As Evidenced by (1): patient asks questions about diet to manage diabetes and CKD       Nutrition Interventions/Recommendations   Nutrition Prescription: Carbohydrate-controlled low sodium diet    Nutrition Interventions:   Food and Nutrient Delivery: Meals & Snacks: Modify Composition of Meals/Snacks     Coordination of Care: Collaboration and referral of nutrition care:  (N/A)     Nutrition Education:   Nutrition Education Content: Content related nutrition education    Nutrition Education Topics Discussed:   Reviewed her eating habits, reinforced continuing to integrate vegetables and balance the amount of carbohydrate at meals.   Acknowledged she has done a great job bringing down her A1C! Talked about continuing to monitor A1C with her Mikhail 2.      Educational Handouts Provided: N/A    Nutrition Counseling: Strategies: Nutrition counseling based on motivational interviewing strategy    Readiness to Change : Good  Level of Understanding : Good  Anticipated Compliant : Good         Nutrition Monitoring and  Evaluation   Food/Nutrient Related History Monitoring  Monitoring and Evaluation Plan: Meal/snack pattern, Mineral/element intake  Meal/Snack Pattern: Estimated meal and snack pattern  Criteria: she will use the Plate Method to guide amount/type of foods on her plate  Mineral/Element Intake: Measured mineral/element intake  Criteria: She will continue to cook without added salt and purchase foods low in sodium when she has the opportunity.                                Follow Up: 3/24 at 10:30 phone only

## 2025-01-17 ENCOUNTER — APPOINTMENT (OUTPATIENT)
Dept: NUTRITION | Facility: CLINIC | Age: 75
End: 2025-01-17
Payer: MEDICARE

## 2025-01-17 DIAGNOSIS — E11.9 TYPE 2 DIABETES MELLITUS WITHOUT COMPLICATION, WITHOUT LONG-TERM CURRENT USE OF INSULIN (MULTI): Primary | ICD-10-CM

## 2025-01-17 PROCEDURE — 97803 MED NUTRITION INDIV SUBSEQ: CPT | Performed by: DIETITIAN, REGISTERED

## 2025-01-17 NOTE — PATIENT INSTRUCTIONS
Reviewed her eating habits, reinforced continuing to integrate vegetables and balance the amount of carbohydrate at meals.   Acknowledged she has done a great job bringing down her A1C! Talked about continuing to monitor A1C with her Mikhail 2.

## 2025-01-23 ENCOUNTER — PATIENT OUTREACH (OUTPATIENT)
Dept: CARE COORDINATION | Facility: CLINIC | Age: 75
End: 2025-01-23
Payer: MEDICARE

## 2025-01-23 SDOH — ECONOMIC STABILITY: TRANSPORTATION INSECURITY
IN THE PAST 12 MONTHS, HAS THE LACK OF TRANSPORTATION KEPT YOU FROM MEDICAL APPOINTMENTS OR FROM GETTING MEDICATIONS?: YES

## 2025-01-23 SDOH — ECONOMIC STABILITY: TRANSPORTATION INSECURITY
IN THE PAST 12 MONTHS, HAS LACK OF TRANSPORTATION KEPT YOU FROM MEETINGS, WORK, OR FROM GETTING THINGS NEEDED FOR DAILY LIVING?: NO

## 2025-01-23 ASSESSMENT — SOCIAL DETERMINANTS OF HEALTH (SDOH): HOW HARD IS IT FOR YOU TO PAY FOR THE VERY BASICS LIKE FOOD, HOUSING, MEDICAL CARE, AND HEATING?: SOMEWHAT HARD

## 2025-01-24 ENCOUNTER — DOCUMENTATION (OUTPATIENT)
Dept: CARE COORDINATION | Facility: CLINIC | Age: 75
End: 2025-01-24
Payer: MEDICARE

## 2025-01-24 NOTE — PROGRESS NOTES
Received a return phone call from Lynne Power  with [facility] Dept on Aging.  Out of [facility] transportation will be checked for [facility] [employer] [facility] and Maria Luz will get back to this .  SNAP increase confirmed with new card loaded 1/14/2025 with $100.  $50 [place] food card as well.  Collaboration about status and resources.  We will likely close soon and Maria Luz will follow and assist.  We will remain available and monitor status with likely closing after next call.  Latasha aware.

## 2025-01-27 ENCOUNTER — DOCUMENTATION (OUTPATIENT)
Dept: CARE COORDINATION | Facility: CLINIC | Age: 75
End: 2025-01-27
Payer: MEDICARE

## 2025-01-27 ENCOUNTER — TELEPHONE (OUTPATIENT)
Dept: PRIMARY CARE | Facility: CLINIC | Age: 75
End: 2025-01-27
Payer: MEDICARE

## 2025-01-27 DIAGNOSIS — E11.9 TYPE 2 DIABETES MELLITUS WITHOUT COMPLICATION, WITHOUT LONG-TERM CURRENT USE OF INSULIN (MULTI): ICD-10-CM

## 2025-01-27 SDOH — ECONOMIC STABILITY: FOOD INSECURITY: WITHIN THE PAST 12 MONTHS, YOU WORRIED THAT YOUR FOOD WOULD RUN OUT BEFORE YOU GOT MONEY TO BUY MORE.: OFTEN TRUE

## 2025-01-27 SDOH — ECONOMIC STABILITY: FOOD INSECURITY: WITHIN THE PAST 12 MONTHS, THE FOOD YOU BOUGHT JUST DIDN'T LAST AND YOU DIDN'T HAVE MONEY TO GET MORE.: OFTEN TRUE

## 2025-01-27 NOTE — PROGRESS NOTES
"Advised by Maria Luz SUTTON with [facility] Dept on Aging of transportation available for Latasha to go to [facility] for [facility].  Office advise and requested new order.  Follow up phone call to Latasha this date.  She declines [employer] at this time and feels that is it now worth the time and she states, \"I am a picky eater and don't like some of the stuff\".  PCP's office advised.  Latasha did go to  [facility] and did get her labs drawn today.  She shares some frustration with spending over 2 hours getting her blood drawn and giving a urine sample.  She has [facility], has been working with her  Team as well and goals have been met.  [facility] [employer] has resolved SNAP issues.  Goals met.  We will close Latasha at this time.  She does have our # [phone]   CASE CLOSED  "

## 2025-01-28 ENCOUNTER — APPOINTMENT (OUTPATIENT)
Dept: PRIMARY CARE | Facility: CLINIC | Age: 75
End: 2025-01-28
Payer: MEDICARE

## 2025-01-28 LAB
25(OH)D3+25(OH)D2 SERPL-MCNC: 37 NG/ML (ref 30–100)
ALBUMIN SERPL-MCNC: 4.3 G/DL (ref 3.6–5.1)
ALBUMIN/CREAT UR: 12 MG/G CREAT
BUN SERPL-MCNC: 47 MG/DL (ref 7–25)
BUN/CREAT SERPL: 38 (CALC) (ref 6–22)
CALCIUM SERPL-MCNC: 9.6 MG/DL (ref 8.6–10.4)
CHLORIDE SERPL-SCNC: 104 MMOL/L (ref 98–110)
CO2 SERPL-SCNC: 27 MMOL/L (ref 20–32)
CREAT SERPL-MCNC: 1.23 MG/DL (ref 0.6–1)
CREAT UR-MCNC: 125 MG/DL (ref 20–275)
EGFRCR SERPLBLD CKD-EPI 2021: 47 ML/MIN/1.73M2
ERYTHROCYTE [DISTWIDTH] IN BLOOD BY AUTOMATED COUNT: 13.9 % (ref 11–15)
FERRITIN SERPL-MCNC: 252 NG/ML (ref 16–288)
GLUCOSE SERPL-MCNC: 121 MG/DL (ref 65–139)
HCT VFR BLD AUTO: 39.9 % (ref 35–45)
HGB BLD-MCNC: 13.1 G/DL (ref 11.7–15.5)
IRON SATN MFR SERPL: 20 % (CALC) (ref 16–45)
IRON SERPL-MCNC: 75 MCG/DL (ref 45–160)
MCH RBC QN AUTO: 31 PG (ref 27–33)
MCHC RBC AUTO-ENTMCNC: 32.8 G/DL (ref 32–36)
MCV RBC AUTO: 94.3 FL (ref 80–100)
MICROALBUMIN UR-MCNC: 1.5 MG/DL
PHOSPHATE SERPL-MCNC: 3.6 MG/DL (ref 2.1–4.3)
PLATELET # BLD AUTO: 225 THOUSAND/UL (ref 140–400)
PMV BLD REES-ECKER: 10.1 FL (ref 7.5–12.5)
POTASSIUM SERPL-SCNC: 4.7 MMOL/L (ref 3.5–5.3)
PTH-INTACT SERPL-MCNC: 62 PG/ML (ref 16–77)
RBC # BLD AUTO: 4.23 MILLION/UL (ref 3.8–5.1)
SODIUM SERPL-SCNC: 139 MMOL/L (ref 135–146)
TIBC SERPL-MCNC: 384 MCG/DL (CALC) (ref 250–450)
WBC # BLD AUTO: 8.4 THOUSAND/UL (ref 3.8–10.8)

## 2025-01-29 DIAGNOSIS — I10 BENIGN ESSENTIAL HTN: ICD-10-CM

## 2025-01-29 RX ORDER — LOSARTAN POTASSIUM 50 MG/1
50 TABLET ORAL 2 TIMES DAILY
Qty: 180 TABLET | Refills: 1 | Status: SHIPPED | OUTPATIENT
Start: 2025-01-29

## 2025-01-31 ENCOUNTER — OFFICE VISIT (OUTPATIENT)
Dept: PRIMARY CARE | Facility: CLINIC | Age: 75
End: 2025-01-31
Payer: MEDICARE

## 2025-01-31 VITALS
TEMPERATURE: 96.7 F | HEIGHT: 64 IN | DIASTOLIC BLOOD PRESSURE: 82 MMHG | BODY MASS INDEX: 32.91 KG/M2 | HEART RATE: 70 BPM | SYSTOLIC BLOOD PRESSURE: 122 MMHG | OXYGEN SATURATION: 97 % | WEIGHT: 192.8 LBS

## 2025-01-31 DIAGNOSIS — J30.9 ALLERGIC RHINITIS, UNSPECIFIED SEASONALITY, UNSPECIFIED TRIGGER: ICD-10-CM

## 2025-01-31 DIAGNOSIS — E11.9 TYPE 2 DIABETES MELLITUS WITHOUT COMPLICATION, WITHOUT LONG-TERM CURRENT USE OF INSULIN (MULTI): ICD-10-CM

## 2025-01-31 PROCEDURE — 1157F ADVNC CARE PLAN IN RCRD: CPT | Performed by: NURSE PRACTITIONER

## 2025-01-31 PROCEDURE — 4010F ACE/ARB THERAPY RXD/TAKEN: CPT | Performed by: NURSE PRACTITIONER

## 2025-01-31 PROCEDURE — 3008F BODY MASS INDEX DOCD: CPT | Performed by: NURSE PRACTITIONER

## 2025-01-31 PROCEDURE — 3079F DIAST BP 80-89 MM HG: CPT | Performed by: NURSE PRACTITIONER

## 2025-01-31 PROCEDURE — 1159F MED LIST DOCD IN RCRD: CPT | Performed by: NURSE PRACTITIONER

## 2025-01-31 PROCEDURE — 1126F AMNT PAIN NOTED NONE PRSNT: CPT | Performed by: NURSE PRACTITIONER

## 2025-01-31 PROCEDURE — 1124F ACP DISCUSS-NO DSCNMKR DOCD: CPT | Performed by: NURSE PRACTITIONER

## 2025-01-31 PROCEDURE — 1036F TOBACCO NON-USER: CPT | Performed by: NURSE PRACTITIONER

## 2025-01-31 PROCEDURE — 99214 OFFICE O/P EST MOD 30 MIN: CPT | Performed by: NURSE PRACTITIONER

## 2025-01-31 PROCEDURE — 3074F SYST BP LT 130 MM HG: CPT | Performed by: NURSE PRACTITIONER

## 2025-01-31 RX ORDER — FLUTICASONE PROPIONATE 50 MCG
1 SPRAY, SUSPENSION (ML) NASAL DAILY
Qty: 16 G | Refills: 11 | Status: SHIPPED | OUTPATIENT
Start: 2025-01-31 | End: 2026-01-31

## 2025-01-31 RX ORDER — MONTELUKAST SODIUM 10 MG/1
10 TABLET ORAL NIGHTLY
Qty: 30 TABLET | Refills: 5 | Status: SHIPPED | OUTPATIENT
Start: 2025-01-31 | End: 2025-07-30

## 2025-01-31 RX ORDER — LANCETS 33 GAUGE
EACH MISCELLANEOUS
COMMUNITY
Start: 2024-12-19

## 2025-01-31 ASSESSMENT — ENCOUNTER SYMPTOMS
FREQUENCY: 0
DEPRESSION: 0
VOMITING: 0
NAUSEA: 0
HEADACHES: 0
FEVER: 0
COUGH: 0
FATIGUE: 0
OCCASIONAL FEELINGS OF UNSTEADINESS: 0
SHORTNESS OF BREATH: 0
DYSURIA: 0
ABDOMINAL PAIN: 0
RHINORRHEA: 1
CONSTIPATION: 0
SINUS PRESSURE: 0
SINUS PAIN: 0
DIARRHEA: 0
CHILLS: 0
LOSS OF SENSATION IN FEET: 0

## 2025-01-31 ASSESSMENT — PAIN SCALES - GENERAL: PAINLEVEL_OUTOF10: 0-NO PAIN

## 2025-01-31 ASSESSMENT — LIFESTYLE VARIABLES: HOW MANY STANDARD DRINKS CONTAINING ALCOHOL DO YOU HAVE ON A TYPICAL DAY: PATIENT DOES NOT DRINK

## 2025-01-31 NOTE — PROGRESS NOTES
"Subjective   Patient ID: Yolanda Zabala is a 74 y.o. female who presents for URI.    Presents today with a couple month history of rhinorrhea.  Patient reports she has had runny nose for a few months.  She has tried Zyrtec with initial improvement in symptoms but in the last couple weeks this has not been the case.  She denies sinus pain and pressure.  She denies ear pressure.  She denies cough.  She denies shortness of breath.  Has elevated temperature.  She has used Flonase in the past but does not remember if it was effective.  She has not tried Allegra/fexofenadine or Claritin/loratadine.  She reports diabetes is well-controlled currently  * This note was partially generated using the Dragon Voice recognition system. There may be some incorrect wording, spelling and/or spelling errors or punctuation errors that were not corrected prior to committing the note to the medical record.*         Review of Systems   Constitutional:  Negative for chills, fatigue and fever.   HENT:  Positive for congestion and rhinorrhea. Negative for ear pain, sinus pressure and sinus pain.    Eyes:  Negative for visual disturbance.   Respiratory:  Negative for cough and shortness of breath.    Cardiovascular:  Negative for chest pain.   Gastrointestinal:  Negative for abdominal pain, constipation, diarrhea, nausea and vomiting.   Genitourinary:  Negative for dysuria and frequency.   Skin:  Negative for rash.   Neurological:  Negative for headaches.       Objective   /82 (BP Location: Right arm, Patient Position: Sitting)   Pulse 70   Temp 35.9 °C (96.7 °F) (Temporal)   Ht 1.626 m (5' 4\")   Wt 87.5 kg (192 lb 12.8 oz)   SpO2 97%   BMI 33.09 kg/m²     Physical Exam  Vitals reviewed.   Constitutional:       General: She is not in acute distress.     Appearance: Normal appearance.   HENT:      Head: Normocephalic.      Right Ear: Tympanic membrane normal.      Left Ear: Tympanic membrane normal.      Nose: Congestion and " rhinorrhea present. Rhinorrhea is clear.      Right Turbinates: Swollen and pale.      Left Turbinates: Swollen and pale.      Right Sinus: No maxillary sinus tenderness or frontal sinus tenderness.      Left Sinus: No maxillary sinus tenderness or frontal sinus tenderness.      Mouth/Throat:      Mouth: Mucous membranes are dry.      Pharynx: Oropharynx is clear. No posterior oropharyngeal erythema.   Eyes:      Extraocular Movements: Extraocular movements intact.      Conjunctiva/sclera: Conjunctivae normal.   Cardiovascular:      Rate and Rhythm: Normal rate and regular rhythm.      Pulses: Normal pulses.      Heart sounds: No murmur heard.  Pulmonary:      Effort: Pulmonary effort is normal.      Breath sounds: No wheezing or rhonchi.   Abdominal:      General: Bowel sounds are normal. There is no distension.      Palpations: Abdomen is soft.      Tenderness: There is no abdominal tenderness. There is no rebound.   Musculoskeletal:      Right lower leg: No edema.      Left lower leg: No edema.   Lymphadenopathy:      Cervical: No cervical adenopathy.   Skin:     General: Skin is warm.      Coloration: Skin is not jaundiced.   Neurological:      General: No focal deficit present.      Mental Status: She is alert and oriented to person, place, and time.      Cranial Nerves: No cranial nerve deficit.      Gait: Gait normal.   Psychiatric:         Mood and Affect: Mood normal.         Assessment/Plan   Problem List Items Addressed This Visit             ICD-10-CM    Type 2 diabetes mellitus without complication, without long-term current use of insulin (Multi) E11.9    Relevant Orders    Follow Up In Primary Care - Established     Other Visit Diagnoses         Codes    Allergic rhinitis, unspecified seasonality, unspecified trigger     J30.9    Relevant Medications    montelukast (Singulair) 10 mg tablet    fluticasone (Flonase) 50 mcg/actuation nasal spray    Other Relevant Orders    Follow Up In Primary Care -  Established        Start Allegra (fexofenadine) once a day  Start Singulair once a day  Start Flonase one spray in each nostril once a day.  When administering point tip towards each ear and take in a slow deep breath after administering  Do not snuff the medication or point the tip straight back to avoid medication going down your throat  Focus on healthy eating including lean proteins and vegetables.  Avoid high carbohydrate high sugar foods and drinks.  Try to increase physical activity as tolerated.  Goal is for 160 minutes/week of physical activity.  Check blood pressure 2-3 times a week.  Call for blood pressures greater than 140/90.  Bring list of blood pressures to next visit.  Check blood sugars 1-2 times per day.  Call for fasting blood sugars that are remaining greater than 150 or random blood sugars that are remaining greater than 180.

## 2025-01-31 NOTE — PATIENT INSTRUCTIONS
Start Allegra (fexofenadine) once a day  Start Singulair once a day  Start Flonase one spray in each nostril once a day.  When administering point tip towards each ear and take in a slow deep breath after administering  Do not snuff the medication or point the tip straight back to avoid medication going down your throat  Focus on healthy eating including lean proteins and vegetables.  Avoid high carbohydrate high sugar foods and drinks.  Try to increase physical activity as tolerated.  Goal is for 160 minutes/week of physical activity.  Check blood pressure 2-3 times a week.  Call for blood pressures greater than 140/90.  Bring list of blood pressures to next visit.  Check blood sugars 1-2 times per day.  Call for fasting blood sugars that are remaining greater than 150 or random blood sugars that are remaining greater than 180.

## 2025-02-04 ENCOUNTER — APPOINTMENT (OUTPATIENT)
Dept: NEPHROLOGY | Facility: CLINIC | Age: 75
End: 2025-02-04
Payer: MEDICARE

## 2025-02-04 ENCOUNTER — TELEMEDICINE (OUTPATIENT)
Dept: PHARMACY | Facility: HOSPITAL | Age: 75
End: 2025-02-04
Payer: MEDICARE

## 2025-02-04 VITALS
DIASTOLIC BLOOD PRESSURE: 62 MMHG | SYSTOLIC BLOOD PRESSURE: 89 MMHG | TEMPERATURE: 97.3 F | HEART RATE: 79 BPM | BODY MASS INDEX: 32.15 KG/M2 | OXYGEN SATURATION: 96 % | HEIGHT: 65 IN | WEIGHT: 193 LBS

## 2025-02-04 DIAGNOSIS — N06.0 ISOLATED PROTEINURIA WITH MINOR GLOMERULAR ABNORMALITY: ICD-10-CM

## 2025-02-04 DIAGNOSIS — I10 BENIGN ESSENTIAL HTN: ICD-10-CM

## 2025-02-04 DIAGNOSIS — N18.31 STAGE 3A CHRONIC KIDNEY DISEASE (MULTI): Primary | ICD-10-CM

## 2025-02-04 DIAGNOSIS — E11.9 TYPE 2 DIABETES MELLITUS WITHOUT COMPLICATION, WITHOUT LONG-TERM CURRENT USE OF INSULIN (MULTI): ICD-10-CM

## 2025-02-04 PROCEDURE — 3074F SYST BP LT 130 MM HG: CPT | Performed by: INTERNAL MEDICINE

## 2025-02-04 PROCEDURE — 3008F BODY MASS INDEX DOCD: CPT | Performed by: INTERNAL MEDICINE

## 2025-02-04 PROCEDURE — 1159F MED LIST DOCD IN RCRD: CPT | Performed by: INTERNAL MEDICINE

## 2025-02-04 PROCEDURE — 1157F ADVNC CARE PLAN IN RCRD: CPT | Performed by: INTERNAL MEDICINE

## 2025-02-04 PROCEDURE — G2211 COMPLEX E/M VISIT ADD ON: HCPCS | Performed by: INTERNAL MEDICINE

## 2025-02-04 PROCEDURE — 99214 OFFICE O/P EST MOD 30 MIN: CPT | Performed by: INTERNAL MEDICINE

## 2025-02-04 PROCEDURE — 3078F DIAST BP <80 MM HG: CPT | Performed by: INTERNAL MEDICINE

## 2025-02-04 PROCEDURE — 4010F ACE/ARB THERAPY RXD/TAKEN: CPT | Performed by: INTERNAL MEDICINE

## 2025-02-04 RX ORDER — TIRZEPATIDE 7.5 MG/.5ML
7.5 INJECTION, SOLUTION SUBCUTANEOUS WEEKLY
Qty: 2 ML | Refills: 3 | Status: SHIPPED | OUTPATIENT
Start: 2025-02-04

## 2025-02-04 NOTE — Clinical Note
Patient tolerated increase to Mounjaro 7.5 mg well, will continue at this dose for at least another month. Fasting sugar reported as 133, but patient also reports if sugars before bed are below 100 eating a snack because she is concerned they will drop overnight, have tried educating patient that she would most likely not drop over night as she is not on insulin

## 2025-02-04 NOTE — PROGRESS NOTES
Subjective       Yolanda Zabala is a 74y.o. female who has past medical history of hypertension, recurrent gout, hypothyroidism, systemic lupus erythematosus, type 2 diabetes was coming to see me today for proteinuric CKD follow-up    Yolanda came alone today.  No kidney related complaints or concerns.  Blood pressure is low today 98/63-no symptoms.  She does not check blood pressure at home.  She got a new blood pressure machine that well start using.  She will send me 2 weeks blood pressure log for review.  Repeat blood work showed stable serum creatinine 1.1 GFR 47.  Normal electrolytes.  Spot test ACR is negative (improved from 500 mg earlier).  No anemia hemoglobin 13.1.  No leg swelling or shortness of breath.  Hemoglobin A1c is in target-she continues to be on Mounjaro.  Overall Yolanda is doing good from kidney standpoint    Prior notes     Last office visit January 2024. Overall patient is doing well with some improved renal function and proteinuria since starting Farxiga. Calcium was elevated on recent labs, but patient reports drinking adequate water. Her hyperkalemia has resolved at this time. Blood pressure within normal limits. Physical exam unremarkable for any lower extremity edema. Patient is accompanied alone and has no further concerns from a renal stand-point.      Prior notes   Last office visit July 2023.  We discussed conservative measures.  We discussed healthier diet.  Today, Yolanda came alone.  She reports trying to be on healthier diet.  She is trying to avoid processed meat as possible.  She increased fresh fruits and vegetables.  She increase Farxiga 10 mg as instructed.  She reports that she is very low income and healthier food options are not available easily.  She denies any problems getting her medications-all are fully covered including Farxiga.  Repeat blood work showed stable serum creatinine 0.9 and GFR 60.  Potassium is borderline high 5.3.  Albuminuria is still significant 565  "mg/g.  No leg swelling or shortness of breath.  She reports muscle pain especially around the axilla.     Prior notes  Yolanda came alone today. She reports long history of systemic lupus. She has been on hydroxychloroquine for some time. She gets her eye checked annually. She saw nephrology in the past and that she reports undergoing kidney biopsy-she cannot remember what wild the results at that time. She denies lower urine tract symptoms. She has uncontrolled diabetes with A1c 12.3%. She does not endorse signs of lupus activities. She continues to follow with rheumatology for lupus     Social history: Non-smoker, unemployed  Family history: Irrelevant  Surgical history: Irrelevant       Objective   BP 89/62 (BP Location: Right arm, Patient Position: Standing, BP Cuff Size: Large adult)   Pulse 79   Temp 36.3 °C (97.3 °F)   Ht 1.651 m (5' 5\")   Wt 87.5 kg (193 lb)   SpO2 96%   BMI 32.12 kg/m²   Wt Readings from Last 3 Encounters:   02/04/25 87.5 kg (193 lb)   01/31/25 87.5 kg (192 lb 12.8 oz)   12/03/24 91.8 kg (202 lb 6.4 oz)       Physical Exam    General appearance: no distress awake and alert on room air, euvolemic on exam  Eyes: non-icteric  HEENT: atrumatic head, PEERLA, moist mucosa  Skin: no apparent rash  Heart: NSR, S1, S2 normal, no murmur or gallop  Lungs: Symmetrical expansion,CTA bilat no wheezing/crackles  Abdomen: soft, nt/nd, obese  Extremities: no edema bilat  Neuro: No FND,asterixis, no focal deficits noticed        Review of Systems     Constitutional: no fever, no chills, no recent weight gain and no recent weight loss.   Eyes: no blurred vision and no diplopia.   ENT: no hearing loss, no earache, no sore throat, no swollen glands in the neck and no nasal discharge.   Cardiovascular: no chest pain, no palpitations and no lower extremity edema.   Respiratory: no shortness of breath, no chronic cough and no shortness of breath during exertion.   Gastrointestinal: no abdominal pain, no " "constipation, no heartburn, no vomiting, no bloody stools and no change in bowel movements.   Genitourinary: no dysuria and no hematuria.   Musculoskeletal: no arthralgias and no myalgias.   Skin: no rashes and no skin lesions.   Neurological: no headaches and no dizziness.   Psychiatric: no confusion, no depression and no anxiety.   Endocrine: no heat intolerance, no cold intolerance, appetite not increased, no thyroid disorder, no increased urinary frequency and no dry skin.   Hematologic/Lymphatic: does not bleed easily and does not bruise easily.   All other systems have been reviewed and are negative for complaint.         Data Review      Lab Results   Component Value Date    URICACID 4.5 06/18/2024           Lab Results   Component Value Date    HGBA1C 7.6 (A) 12/03/2024                 No lab exists for component: \"CR\", \"PHOSPHORUS\"          ALBUMIN/CREATININE RATIO, RANDOM URINE   Date Value Ref Range Status   01/27/2025 12 <30 mg/g creat Final     Comment:        The ADA defines abnormalities in albumin  excretion as follows:     Albuminuria Category        Result (mg/g creatinine)     Normal to Mildly increased   <30  Moderately increased            Severely increased           > OR = 300     The ADA recommends that at least two of three  specimens collected within a 3-6 month period be  abnormal before considering a patient to be  within a diagnostic category.       Albumin/Creatinine Ratio   Date Value Ref Range Status   06/18/2024 383.7 (H) <30.0 ug/mg Creat Final   01/08/2024 565.8 (H) <30.0 ug/mg Creat Final            RFP  Recent Labs     01/27/25  0000 09/24/24  1552 06/18/24  1058 01/08/24  1118 11/30/23  1056 08/11/23  1440 04/19/23  1211 02/27/23  1103 10/27/20  1300 09/25/20  1228 09/22/20  1236 09/19/20  1807 09/19/20  1557    139 139 141 138 139 137 137   < > 141   < > 142 143   K 4.7 4.9 4.7 5.3 4.5 4.3 5.0 5.1   < > 5.2   < > 4.8 5.2*    104 100 102 103 101 98 101   < > " 106   < > 109* 108*   CO2 27 22 26 27 23 25 26 25   < > 27   < > 19* 20*   BUN 47* 30* 34* 26* 40* 27* 22 33*   < > 35*   < > 41* 43*   CREATININE 1.23* 0.67 1.01 0.99 1.00 0.97 1.01 0.97   < > 0.93   < > 1.0 1.0   GLUCOSE 121 122* 273* 181* 139* 140* 406* 166*   < > 95   < > 59* 80   CALCIUM 9.6 10.1 10.6* 10.1 9.7 9.8 9.6 10.6*   < > 9.9   < > 9.5 10.3   PHOS 3.6  --  4.0 4.0  --   --   --   --   --  3.7  --   --   --    EGFR 47* >90 59* 61 60*  --   --   --   --   --   --  59 59   ANIONGAP  --  18 18 17 17 17 18 16   < > 13   < > 14 15    < > = values in this interval not displayed.        Urineanalysis  Recent Labs     06/18/24  1144 01/08/24  1118 02/27/23  1103 11/03/21  1318 03/22/21  0712 09/25/20  1459 09/19/20  1755 11/08/18  1549 11/06/17  1554   COLORU Light-Yellow Yellow  --   --  YELLOW YELLOW YELLOW YELLOW  --    APPEARANCEU Clear Clear  --   --  CLEAR CLEAR CLEAR HAZY  --    SPECGRAVU 1.031 1.021  --   --  1.015 1.015 1.026 1.020  --    PEEWEE 5.5 6.0  --   --  5.0 5.0 5.5 5.0  --    PROTUR 20 (TRACE) 100 (2+)* 59* 75* 100(2+)* NEGATIVE 30* 100(2+)* 46*   GLUCOSEU OVER (4+)* >=500 (3+)*  --   --  NEGATIVE NEGATIVE NEGATIVE NEGATIVE  --    BLOODU NEGATIVE NEGATIVE  --   --  NEGATIVE NEGATIVE NEGATIVE NEGATIVE  --    KETONESU NEGATIVE NEGATIVE  --   --  NEGATIVE NEGATIVE NEGATIVE NEGATIVE  --    BILIRUBINU NEGATIVE NEGATIVE  --   --  NEGATIVE NEGATIVE NEGATIVE NEGATIVE  --    NITRITEU NEGATIVE NEGATIVE  --   --  NEGATIVE NEGATIVE NEGATIVE POSITIVE*  --    LEUKOCYTESU 25 Madalyn/µL* SMALL (1+)*  --   --  NEGATIVE NEGATIVE SMALL* NEGATIVE  --        Urine Electrolytes  Recent Labs     01/27/25  0000 06/18/24  1144 01/08/24  1118 11/30/23  1056 02/27/23  1103 08/08/22  0912 11/03/21  1318 07/27/21  1152 03/22/21  0712 09/25/20  1459 09/19/20  1755 11/08/18  1549 11/06/17  1554   CREATU 125 50.4 58.4  54.9 91.2 92.4 81.1 81.1 62.1  --   --   --   --  90.0   PROTUR  --  20 (TRACE) 100 (2+)*  --  59*  --  75*  --   100(2+)* NEGATIVE 30* 100(2+)* 46*   UTPCR  --   --  0.95*  --  0.64*  --  0.92*  --   --   --   --   --  0.51   ALBUMINUR 1.5 193.4 330.4 108.7  --   --   --   --   --   --   --   --   --    MICROALBCREA 12 383.7* 565.8* 119.2*  --  154.0*  --  162.0*  --   --   --   --   --         Urine Micro  Recent Labs     06/18/24  1144 01/08/24  1118 03/22/21  0712 09/19/20  1755 11/08/18  1549 11/06/17  1554   WBCU 6-10* 1-5 3 6* 5-20* 5-20*   RBCU 1-2 NONE 1 1 0-5 0-5   HYALCASTU  --   --   --  3  --   --    SQUAMEPIU 1-9 (SPARSE) 1-9 (SPARSE) <1 FEW 2+ 1+   BACTERIAU  --  1+*  --  POSITIVE 4+* 4+*   MUCUSU  --  FEW FEW  --   --   --         Iron  Recent Labs     01/27/25  0000 08/11/23  1440   IRON 75 56   TIBC 384 420   IRONSAT 20 13*   FERRITIN 252 333*          Current Outpatient Medications on File Prior to Visit   Medication Sig Dispense Refill    albuterol 90 mcg/actuation inhaler Inhale 2 puffs every 6 hours if needed for wheezing. 18 g 3    allopurinol (Zyloprim) 300 mg tablet Take 1 tablet (300 mg) by mouth once daily. 90 tablet 3    atorvastatin (Lipitor) 20 mg tablet Take 1 tablet (20 mg) by mouth once daily. 90 tablet 3    blood sugar diagnostic strip Please prescribe strips compatible with one touch verio 100 strip 3    blood-glucose meter misc Blood sugar checks bid , one touch verio 1 each 0    dapagliflozin propanediol (Farxiga) 10 mg Take two  5 mg tablets orally daily 30 tablet 11    flash glucose sensor kit (FreeStyle Mikhail 2 Sensor) kit Use as instructed 6 each 3    fluticasone (Flonase) 50 mcg/actuation nasal spray Administer 1 spray into each nostril once daily. Shake gently. Before first use, prime pump. After use, clean tip and replace cap. 16 g 11    hydroxychloroquine (Plaquenil) 200 mg tablet Take 1 tablet (200 mg) by mouth once daily. 30 tablet 11    lancets misc Blood sugar checks bid 100 each 3    latanoprost (Xalatan) 0.005 % ophthalmic solution INSTILL 1 DROP INTO EACH EYE ONCE DAILY AT  NIGHT      losartan (Cozaar) 50 mg tablet Take 1 tablet by mouth twice daily 180 tablet 1    metFORMIN (Glucophage) 1,000 mg tablet Take 1 tablet (1,000 mg) by mouth 2 times daily (morning and late afternoon). Take with morning and evening meal. 180 tablet 3    metoprolol tartrate (Lopressor) 100 mg tablet Take 0.5 tablets (50 mg) by mouth 2 times a day. 180 tablet 3    montelukast (Singulair) 10 mg tablet Take 1 tablet (10 mg) by mouth once daily at bedtime. 30 tablet 5    OneTouch Delica Plus Lancet 33 gauge misc USE 1 LANCET TO CHECK GLUCOSE TWICE DAILY      cetirizine (ZyrTEC) 10 mg tablet Take 1 tablet (10 mg) by mouth once daily. 30 tablet 11    ibuprofen 600 mg tablet Take 1 tablet (600 mg) by mouth every 6 hours if needed for mild pain (1 - 3). (Patient not taking: Reported on 2/4/2025)      levothyroxine (Synthroid, Levoxyl) 100 mcg tablet Take 1 tablet (100 mcg) by mouth once daily. 90 tablet 3    magnesium oxide 400 mg magnesium capsule Take 1 capsule (400 mg) by mouth once daily. 30 capsule 11    [DISCONTINUED] losartan (Cozaar) 50 mg tablet Take 1 tablet (50 mg) by mouth 2 times a day. 180 tablet 3    [DISCONTINUED] tirzepatide (Mounjaro) 5 mg/0.5 mL pen injector Inject 5 mg under the skin every 7 days. 2 mL 3     No current facility-administered medications on file prior to visit.       Assessment and Plan       Yolanda Zabala  is a 74y.o. 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 Less likely lupus nephritis in the absence of hematuria and with negative lupus markers.      Impression  #nephrotic range proteinuria with improvement  -Serum creatinine 1-1.1 and GFR around 50-60 both improved from prior   -most recent Urine dipstick earlier is bland with no blood or albumin, significant 4+ glucose in the urine due to SGL 2  elevators  -ACR is improved 500-->300--> negative  -Continue Farxiga, Mounjaro and cozaar      #Type 2 diabetes-better controlled  -Hemoglobin A1c 7 %; continue same  -on Mounjaro and Farxiga and metformin 1000 mg twice daily     #SLE  -Within normal C3-C4, negative anti-double-stranded DNA  -On hydroxychloroquine  -Instructed against NSAID use     #No significant anemia         Follow-up in 12 months with labs    Neal Tanner MD, MS, KEVIN MEEKS   Clinical  - [employer]   Nephrologist - [facility]

## 2025-02-04 NOTE — PATIENT INSTRUCTIONS
Dear NIKKIE   It was nice seeing you in the nephrology clinic today     Today we discussed the following:     #Chronic kidney disease stage III-baseline kidney function 50-60%-most likely related to diabetes.  Most recent kidney function is 50%     #Protein leak in the urine-improved.  Most like related to uncontrolled diabetes. Less likely related to lupus.  Continue Farxiga 10 mg and continue losartan 50 mg twice daily.  Protein leak in the urine had resolved      # Diabetes-better controlled now.  Continue Mounjaro    #Avoid taking meloxicam/Mobic-this medication can cause further damage to your kidneys     #Limit salt intake, limit processed meat.  Increase fresh fruits and vegetables    # Blood pressure is low today-no symptoms.  Please send me 2 weeks blood pressure log to review.  If blood pressure continues to be low we will decrease Cozaar to once daily     Follow-up in 12 months with repeat blood work and urinalysis prior to next visit     Please call our office if you have any question  Thank you for coming to see me today     Neal Tanner MD, MS, KEVIN MEEKS  Clinical  - [employer] [facility] [employer]  Nephrologist - [facility] - [facility]

## 2025-02-04 NOTE — PROGRESS NOTES
Clinical Pharmacy Appointment    Patient ID: Yolanda Zabala is a 74 y.o. female who presents for Diabetes.    Pt is here for Follow Up appointment.     Referring Provider/PCP: Caridad Lincoln*  Last visit with PCP: 1/31/25   Next visit with PCP: 3/3/25    Subjective     HPI  DIABETES MELLITUS TYPE II:    Diagnosed with diabetes: at least 2018. Known diabetic complications: None.  Does patient follow with Endocrinology: No  Last optometry exam: 10/22/24  Most recent visit in Podiatry: Every 10 weeks     Current diabetic medications include:  Farxiga 10 mg daily  Metformin 1000 mg twice daily  Mounjaro 7.5 mg weekly on Monday x 3    Clarifications to above regimen: none  Adverse Effects: Occasional bowel urgency    Past diabetic medications include:  Actos 30 mg  Glimepiride 4 mg daily    Glucose Readings:  Glucometer/CGM Type: Freestyle 2 (Uses reader)  Patient tests BG continuously times per day    Current home BG readings: 133 this morning  Previous home BG readings:   around 140 this morning, Down to 98 overnight night  117 this morning  Went up because sick, currently 148 after eating.   140-150    Any episodes of hypoglycemia? No,   .  Did patient treat episode of hypoglycemia appropriately? N/A  Does the patient have a prescription for ready-to-use Glucagon? Not on insulin  Does pt have proteinuria? Yes    Lifestyle: (  Diet: Unknown meals/day.  BK: Unknown  LN: Unknown  DN: Unknown  Snacks: Unknown  Drinks: Unknown  Physical Activity: Unknown    Secondary Prevention:  Statin? Yes  ACE-I/ARB? Yes  Aspirin? No    Pertinent PMH Review:  PMH of Pancreatitis: No  PMH of Retinopathy: No  PMH of Urinary Tract Infections: No  PMH of MTC: No    Medication Reconciliation:  Changed:   Added:   Discontinued:     Drug Interactions  No relevant drug interactions were noted.    Medication System Management  Patients preferred pharmacy: [facility]  Adherence/Organization: None  Affordability/Accessibility:  None      Objective   Allergies   Allergen Reactions    Ace Inhibitors Unknown    Bacitracin Unknown    Penicillins Unknown     can't remember    Phenytoin Unknown    Sulfamethoxazole-Trimethoprim Unknown     Social History     Social History Narrative    Not on file      Medication Review  Current Outpatient Medications   Medication Instructions    albuterol 90 mcg/actuation inhaler 2 puffs, inhalation, Every 6 hours PRN    allopurinol (ZYLOPRIM) 300 mg, oral, Daily    atorvastatin (LIPITOR) 20 mg, oral, Daily    blood sugar diagnostic strip Please prescribe strips compatible with one touch verio    blood-glucose meter misc Blood sugar checks bid , one touch verio    cetirizine (ZYRTEC) 10 mg, oral, Daily    dapagliflozin propanediol (Farxiga) 10 mg Take two  5 mg tablets orally daily    flash glucose sensor kit (FreeStyle Mikhail 2 Sensor) kit Use as instructed    fluticasone (Flonase) 50 mcg/actuation nasal spray 1 spray, Each Nostril, Daily, Shake gently. Before first use, prime pump. After use, clean tip and replace cap.    hydroxychloroquine (PLAQUENIL) 200 mg, oral, Daily    ibuprofen 600 mg, Every 6 hours PRN    lancets misc Blood sugar checks bid    latanoprost (Xalatan) 0.005 % ophthalmic solution INSTILL 1 DROP INTO EACH EYE ONCE DAILY AT NIGHT    levothyroxine (SYNTHROID, LEVOXYL) 100 mcg, oral, Daily    losartan (COZAAR) 50 mg, oral, 2 times daily    magnesium oxide 400 mg, oral, Daily    metFORMIN (GLUCOPHAGE) 1,000 mg, oral, 2 times daily (morning and late afternoon), Take with morning and evening meal.    metoprolol tartrate (LOPRESSOR) 50 mg, oral, 2 times daily    montelukast (SINGULAIR) 10 mg, oral, Nightly    Mounjaro 7.5 mg, subcutaneous, Weekly    OneTouch Delica Plus Lancet 33 gauge misc USE 1 LANCET TO CHECK GLUCOSE TWICE DAILY      Vitals  BP Readings from Last 2 Encounters:   01/31/25 122/82   12/03/24 108/66     BMI Readings from Last 1 Encounters:   01/31/25 33.09 kg/m²      Labs  A1C  Lab  Results   Component Value Date    HGBA1C 7.6 (A) 12/03/2024    HGBA1C 12.4 (A) 09/24/2024    HGBA1C 9.6 (A) 05/30/2024     BMP  Lab Results   Component Value Date    CALCIUM 9.6 01/27/2025     01/27/2025    K 4.7 01/27/2025    CO2 27 01/27/2025     01/27/2025    BUN 47 (H) 01/27/2025    CREATININE 1.23 (H) 01/27/2025    EGFR 47 (L) 01/27/2025     LFTs  Lab Results   Component Value Date    ALT 43 09/24/2024    AST 43 (H) 09/24/2024    ALKPHOS 109 09/24/2024    BILITOT 0.7 09/24/2024     FLP  Lab Results   Component Value Date    TRIG 331 (H) 09/24/2024    CHOL 119 09/24/2024    LDLF 70 08/08/2022    LDLCALC 21 09/24/2024    HDL 31.8 09/24/2024     Urine Microalbumin  Lab Results   Component Value Date    MICROALBCREA 12 01/27/2025     Weight Management  Wt Readings from Last 3 Encounters:   01/31/25 87.5 kg (192 lb 12.8 oz)   12/03/24 91.8 kg (202 lb 6.4 oz)   10/21/24 94.5 kg (208 lb 6.4 oz)      There is no height or weight on file to calculate BMI.     Assessment/Plan   Problem List Items Addressed This Visit       Type 2 diabetes mellitus without complication, without long-term current use of insulin (Multi)     Patient's goal A1c is < 7%.  Is pt at goal? No, 7.6 , However patient reports home health through insurance got an A1C of 6.1 last week at home visit.  Patient's SMBGs are 133 this morning. Patient reports eating a snack if she sees sugars that low because she is scared they will drop even lower over night. Tolerated increase well, says weight is at a standstill, denies over eating.  Rationale for plan: Sugars just around goal, continue    Medication Changes:  CONTINUE:  Farxiga 10 mg daily  Metformin 1000 mg twice daily  Mounjaro 7.5 mg once weekly         Relevant Medications    tirzepatide (Mounjaro) 7.5 mg/0.5 mL pen injector    Other Relevant Orders    Referral to Clinical Pharmacy     Clinical Pharmacist follow-up: 3/4 at 940 am, Telehealth visit    Continue all meds under the  continuation of care with the referring provider and clinical pharmacy team.    Thank you,  Noris Pastor, PharmD, [facility]  Clinical Pharmacy Specialist  [phone]    Verbal consent to manage patient's drug therapy was obtained from the patient. They were informed they may decline to participate or withdraw from participation in pharmacy services at any time.

## 2025-02-04 NOTE — ASSESSMENT & PLAN NOTE
Patient's goal A1c is < 7%.  Is pt at goal? No, 7.6 , However patient reports home health through insurance got an A1C of 6.1 last week at home visit.  Patient's SMBGs are 133 this morning. Patient reports eating a snack if she sees sugars that low because she is scared they will drop even lower over night. Tolerated increase well, says weight is at a standstill, denies over eating.  Rationale for plan: Sugars just around goal, continue    Medication Changes:  CONTINUE:  Farxiga 10 mg daily  Metformin 1000 mg twice daily  Mounjaro 7.5 mg once weekly

## 2025-02-07 ENCOUNTER — TELEPHONE (OUTPATIENT)
Dept: PRIMARY CARE | Facility: CLINIC | Age: 75
End: 2025-02-07
Payer: MEDICARE

## 2025-02-07 NOTE — TELEPHONE ENCOUNTER
Pt called said her sugar goes out of control at night it goes down to 80-96 she said she won't go to bed until she eats something to raise it.  Also wanted you to know she is having issues with her BP.      89/62, 107/71, 112/68, 119/75, 138/77, 128/79  What should she do about both of these problems?

## 2025-02-10 ENCOUNTER — TELEMEDICINE (OUTPATIENT)
Dept: PRIMARY CARE | Facility: CLINIC | Age: 75
End: 2025-02-10
Payer: MEDICARE

## 2025-02-10 DIAGNOSIS — E11.9 TYPE 2 DIABETES MELLITUS WITHOUT COMPLICATION, WITHOUT LONG-TERM CURRENT USE OF INSULIN (MULTI): ICD-10-CM

## 2025-02-10 DIAGNOSIS — I10 BENIGN ESSENTIAL HTN: Primary | ICD-10-CM

## 2025-02-10 RX ORDER — METFORMIN HYDROCHLORIDE 500 MG/1
500 TABLET ORAL
Qty: 180 TABLET | Refills: 3 | Status: SHIPPED | OUTPATIENT
Start: 2025-02-10 | End: 2026-02-10

## 2025-02-10 ASSESSMENT — ENCOUNTER SYMPTOMS
ABDOMINAL PAIN: 0
NAUSEA: 0
FATIGUE: 0
FEVER: 0
DIARRHEA: 0
COUGH: 0
DIZZINESS: 0
SHORTNESS OF BREATH: 0
CHILLS: 0

## 2025-02-10 NOTE — PROGRESS NOTES
Subjective   Patient ID: Yolanda Zabala is a 74 y.o. female who presents for No chief complaint on file..    HPI This visit was completed via audio technology. All issues as below were discussed and addressed and a limited physical exam within the constraints of the technology was performed. If it was felt that the patient should be evaluated in clinic then they were directed there. Verbal consent was requested and obtained from parent/guardian to provide this telehealth service on this date for a telehealth visit.  I spent 8 minutes with patient and/or family, more than 50% of this time was spent in counseling and coordination of care.      blood pressure  89/62 at office visit- using home machine   107/71, 128/79, 119/75, 112/68, 122/68, 118/78, today 120/73  Pulses running 70's   Weight stable 190-191- on mounjaro  Sugars- 80's at hs, this am 117  Review of Systems   Constitutional:  Negative for chills, fatigue and fever.   Respiratory:  Negative for cough and shortness of breath.    Cardiovascular:  Negative for chest pain and leg swelling.   Gastrointestinal:  Negative for abdominal pain, diarrhea and nausea.   Neurological:  Negative for dizziness.       Objective   There were no vitals taken for this visit.    Physical Exam    Assessment/Plan   Problem List Items Addressed This Visit             ICD-10-CM    Benign essential HTN - Primary I10    Type 2 diabetes mellitus without complication, without long-term current use of insulin (Multi) E11.9    Relevant Medications    metFORMIN (Glucophage) 500 mg tablet        Decrease meformin to 500 mg bid , monitor sugars and blood pressure and has follow up in march where labs will be due

## 2025-03-03 ENCOUNTER — APPOINTMENT (OUTPATIENT)
Dept: PRIMARY CARE | Facility: CLINIC | Age: 75
End: 2025-03-03
Payer: MEDICARE

## 2025-03-03 VITALS
HEIGHT: 65 IN | WEIGHT: 193.8 LBS | OXYGEN SATURATION: 98 % | BODY MASS INDEX: 32.29 KG/M2 | HEART RATE: 70 BPM | SYSTOLIC BLOOD PRESSURE: 100 MMHG | DIASTOLIC BLOOD PRESSURE: 58 MMHG | TEMPERATURE: 97.9 F

## 2025-03-03 DIAGNOSIS — E11.9 TYPE 2 DIABETES MELLITUS WITHOUT COMPLICATION, WITHOUT LONG-TERM CURRENT USE OF INSULIN (MULTI): ICD-10-CM

## 2025-03-03 DIAGNOSIS — I10 BENIGN ESSENTIAL HTN: Primary | ICD-10-CM

## 2025-03-03 DIAGNOSIS — J30.9 ALLERGIC RHINITIS, UNSPECIFIED SEASONALITY, UNSPECIFIED TRIGGER: ICD-10-CM

## 2025-03-03 DIAGNOSIS — Z12.31 BREAST CANCER SCREENING BY MAMMOGRAM: ICD-10-CM

## 2025-03-03 LAB — POC HEMOGLOBIN A1C: 6.2 % (ref 4.2–6.5)

## 2025-03-03 PROCEDURE — 3008F BODY MASS INDEX DOCD: CPT | Performed by: FAMILY MEDICINE

## 2025-03-03 PROCEDURE — 1157F ADVNC CARE PLAN IN RCRD: CPT | Performed by: FAMILY MEDICINE

## 2025-03-03 PROCEDURE — 1124F ACP DISCUSS-NO DSCNMKR DOCD: CPT | Performed by: FAMILY MEDICINE

## 2025-03-03 PROCEDURE — 4010F ACE/ARB THERAPY RXD/TAKEN: CPT | Performed by: FAMILY MEDICINE

## 2025-03-03 PROCEDURE — 3078F DIAST BP <80 MM HG: CPT | Performed by: FAMILY MEDICINE

## 2025-03-03 PROCEDURE — 1126F AMNT PAIN NOTED NONE PRSNT: CPT | Performed by: FAMILY MEDICINE

## 2025-03-03 PROCEDURE — 1036F TOBACCO NON-USER: CPT | Performed by: FAMILY MEDICINE

## 2025-03-03 PROCEDURE — 83036 HEMOGLOBIN GLYCOSYLATED A1C: CPT | Performed by: FAMILY MEDICINE

## 2025-03-03 PROCEDURE — 99214 OFFICE O/P EST MOD 30 MIN: CPT | Performed by: FAMILY MEDICINE

## 2025-03-03 PROCEDURE — 3074F SYST BP LT 130 MM HG: CPT | Performed by: FAMILY MEDICINE

## 2025-03-03 PROCEDURE — 1159F MED LIST DOCD IN RCRD: CPT | Performed by: FAMILY MEDICINE

## 2025-03-03 RX ORDER — TIRZEPATIDE 10 MG/.5ML
10 INJECTION, SOLUTION SUBCUTANEOUS WEEKLY
Qty: 2 ML | Refills: 11 | Status: SHIPPED | OUTPATIENT
Start: 2025-03-03 | End: 2026-03-03

## 2025-03-03 RX ORDER — METOPROLOL SUCCINATE 50 MG/1
50 TABLET, EXTENDED RELEASE ORAL DAILY
Qty: 30 TABLET | Refills: 5 | Status: SHIPPED | OUTPATIENT
Start: 2025-03-03 | End: 2025-08-30

## 2025-03-03 ASSESSMENT — PAIN SCALES - GENERAL: PAINLEVEL_OUTOF10: 0-NO PAIN

## 2025-03-03 ASSESSMENT — ENCOUNTER SYMPTOMS
DIZZINESS: 0
ABDOMINAL PAIN: 0
FEVER: 0
CHILLS: 0
FATIGUE: 0
COUGH: 0
SHORTNESS OF BREATH: 0
DIARRHEA: 0
NAUSEA: 0

## 2025-03-03 ASSESSMENT — LIFESTYLE VARIABLES: HOW OFTEN DO YOU HAVE A DRINK CONTAINING ALCOHOL: NEVER

## 2025-03-03 NOTE — PROGRESS NOTES
"Subjective   Patient ID: Yolanda Zabala is a 75 y.o. female who presents for Diabetes.    HPI A1c here 6.2. she lost 20 lbs in last 3-4 months. Renal function satable. Seeing rheum April.     Review of Systems   Constitutional:  Negative for chills, fatigue and fever.   Respiratory:  Negative for cough and shortness of breath.    Cardiovascular:  Negative for chest pain and leg swelling.   Gastrointestinal:  Negative for abdominal pain, diarrhea and nausea.   Neurological:  Negative for dizziness.       Objective   /58   Pulse 70   Temp 36.6 °C (97.9 °F)   Ht 1.651 m (5' 5\")   Wt 87.9 kg (193 lb 12.8 oz)   SpO2 98%   BMI 32.25 kg/m²     Physical Exam  Constitutional:       General: She is not in acute distress.     Appearance: Normal appearance.   HENT:      Head: Normocephalic.      Right Ear: Tympanic membrane and ear canal normal.      Left Ear: Tympanic membrane and ear canal normal.      Nose: Nose normal. No congestion.      Mouth/Throat:      Mouth: Mucous membranes are moist.      Pharynx: Oropharynx is clear.   Eyes:      General:         Right eye: No discharge.         Left eye: No discharge.      Extraocular Movements: Extraocular movements intact.      Conjunctiva/sclera: Conjunctivae normal.      Pupils: Pupils are equal, round, and reactive to light.   Cardiovascular:      Rate and Rhythm: Normal rate and regular rhythm.      Heart sounds: Normal heart sounds. No murmur heard.  Pulmonary:      Effort: Pulmonary effort is normal.      Breath sounds: Normal breath sounds. No wheezing or rhonchi.   Abdominal:      General: There is no distension.      Palpations: Abdomen is soft. There is no mass.      Tenderness: There is no abdominal tenderness.   Musculoskeletal:         General: No swelling, tenderness or deformity.   Lymphadenopathy:      Cervical: No cervical adenopathy.   Skin:     Coloration: Skin is not jaundiced.   Neurological:      General: No focal deficit present.      Mental " Status: She is alert.      Cranial Nerves: No cranial nerve deficit.      Sensory: No sensory deficit.   Psychiatric:         Mood and Affect: Mood normal.         Assessment/Plan   Problem List Items Addressed This Visit             ICD-10-CM    Benign essential HTN - Primary I10    Relevant Medications    metoprolol succinate XL (Toprol-XL) 50 mg 24 hr tablet    Type 2 diabetes mellitus without complication, without long-term current use of insulin (Multi) E11.9    Relevant Medications    tirzepatide (Mounjaro) 10 mg/0.5 mL pen injector    Other Relevant Orders    POCT glycosylated hemoglobin (Hb A1C) manually resulted (Completed)     Other Visit Diagnoses         Codes    Allergic rhinitis, unspecified seasonality, unspecified trigger     J30.9    Breast cancer screening by mammogram     Z12.31    Relevant Orders    BI mammo bilateral screening tomosynthesis        Labs from nephro reviewed  Increase mounjaro to 10mg weekly  Can stop metformin  Change toprol to xl

## 2025-03-04 ENCOUNTER — APPOINTMENT (OUTPATIENT)
Dept: PHARMACY | Facility: HOSPITAL | Age: 75
End: 2025-03-04
Payer: MEDICARE

## 2025-03-04 DIAGNOSIS — E11.9 TYPE 2 DIABETES MELLITUS WITHOUT COMPLICATION, WITHOUT LONG-TERM CURRENT USE OF INSULIN (MULTI): ICD-10-CM

## 2025-03-04 NOTE — ASSESSMENT & PLAN NOTE
Patient's goal A1c is < 7%.  Is pt at goal? Yes, 6.2  Patient's SMBGs are 137 this morning. Patient saw PCP yesterday who stopped metformin and sent for increased dose of Mounjaro.  Rationale for plan: A1C at goal, Treatment per PCP, follow up in 3 weeks    Medication Changes:  CONTINUE:  Farxiga 10 mg daily  INCREASE  Mounjaro 7.5 to 10 mg once weekly

## 2025-03-04 NOTE — PROGRESS NOTES
Clinical Pharmacy Appointment    Patient ID: Yolanda Zabala is a 75 y.o. female who presents for Diabetes.    Pt is here for Follow Up appointment.     Referring Provider/PCP: Caridad Lincoln*  Last visit with PCP: 3/3/25  Next visit with PCP: 9/8/25    Subjective     HPI  DIABETES MELLITUS TYPE II:    Diagnosed with diabetes: at least 2018. Known diabetic complications: None.  Does patient follow with Endocrinology: No  Last optometry exam: 10/22/24  Most recent visit in Podiatry: Every 10 weeks     Current diabetic medications include:  Farxiga 10 mg daily  Mounjaro 7.5 mg weekly on Monday    Clarifications to above regimen: none  Adverse Effects: Occasional bowel urgency    Past diabetic medications include:  Actos 30 mg  Glimepiride 4 mg daily  Metformin 1000 mg twice daily    Glucose Readings:  Glucometer/CGM Type: Freestyle 2 (Uses reader)  Patient tests BG continuously times per day    Current home BG readings: 137 this morning  Previous home BG readings:   133 this morning  around 140 this morning, Down to 98 overnight night  117 this morning  Went up because sick, currently 148 after eating.   140-150    Any episodes of hypoglycemia? No,   .  Did patient treat episode of hypoglycemia appropriately? N/A  Does the patient have a prescription for ready-to-use Glucagon? Not on insulin  Does pt have proteinuria? Yes    Lifestyle: (  Diet: Unknown meals/day.  BK: Unknown  LN: Unknown  DN: Unknown  Snacks: Unknown  Drinks: Unknown  Physical Activity: Unknown    Secondary Prevention:  Statin? Yes  ACE-I/ARB? Yes  Aspirin? No    Pertinent PMH Review:  PMH of Pancreatitis: No  PMH of Retinopathy: No  PMH of Urinary Tract Infections: No  PMH of MTC: No    Medication Reconciliation:  Changed:   Added:   Discontinued:     Drug Interactions  No relevant drug interactions were noted.    Medication System Management  Patients preferred pharmacy: [facility]  Adherence/Organization: None  Affordability/Accessibility:  None      Objective   Allergies   Allergen Reactions    Ace Inhibitors Unknown    Bacitracin Unknown    Penicillins Unknown     can't remember    Phenytoin Unknown    Sulfamethoxazole-Trimethoprim Unknown     Social History     Social History Narrative    Not on file      Medication Review  Current Outpatient Medications   Medication Instructions    albuterol 90 mcg/actuation inhaler 2 puffs, inhalation, Every 6 hours PRN    allopurinol (ZYLOPRIM) 300 mg, oral, Daily    atorvastatin (LIPITOR) 20 mg, oral, Daily    blood sugar diagnostic strip Please prescribe strips compatible with one touch verio    blood-glucose meter misc Blood sugar checks bid , one touch verio    cetirizine (ZYRTEC) 10 mg, oral, Daily    dapagliflozin propanediol (Farxiga) 10 mg Take two  5 mg tablets orally daily    flash glucose sensor kit (FreeStyle Mikhail 2 Sensor) kit Use as instructed    fluticasone (Flonase) 50 mcg/actuation nasal spray 1 spray, Each Nostril, Daily, Shake gently. Before first use, prime pump. After use, clean tip and replace cap.    hydroxychloroquine (PLAQUENIL) 200 mg, oral, Daily    latanoprost (Xalatan) 0.005 % ophthalmic solution INSTILL 1 DROP INTO EACH EYE ONCE DAILY AT NIGHT    levothyroxine (SYNTHROID, LEVOXYL) 100 mcg, oral, Daily    losartan (COZAAR) 50 mg, oral, 2 times daily    magnesium oxide 400 mg, oral, Daily    metoprolol succinate XL (TOPROL-XL) 50 mg, oral, Daily, Do not crush or chew.    montelukast (SINGULAIR) 10 mg, oral, Nightly    Mounjaro 10 mg, subcutaneous, Weekly    OneTouch Delica Plus Lancet 33 gauge misc USE 1 LANCET TO CHECK GLUCOSE TWICE DAILY      Vitals  BP Readings from Last 2 Encounters:   03/03/25 100/58   02/04/25 89/62     BMI Readings from Last 1 Encounters:   03/03/25 32.25 kg/m²      Labs  A1C  Lab Results   Component Value Date    HGBA1C 6.2 03/03/2025    HGBA1C 7.6 (A) 12/03/2024    HGBA1C 12.4 (A) 09/24/2024     BMP  Lab Results   Component Value Date    CALCIUM 9.6  01/27/2025     01/27/2025    K 4.7 01/27/2025    CO2 27 01/27/2025     01/27/2025    BUN 47 (H) 01/27/2025    CREATININE 1.23 (H) 01/27/2025    EGFR 47 (L) 01/27/2025     LFTs  Lab Results   Component Value Date    ALT 43 09/24/2024    AST 43 (H) 09/24/2024    ALKPHOS 109 09/24/2024    BILITOT 0.7 09/24/2024     FLP  Lab Results   Component Value Date    TRIG 331 (H) 09/24/2024    CHOL 119 09/24/2024    LDLF 70 08/08/2022    LDLCALC 21 09/24/2024    HDL 31.8 09/24/2024     Urine Microalbumin  Lab Results   Component Value Date    MICROALBCREA 12 01/27/2025     Weight Management  Wt Readings from Last 3 Encounters:   03/03/25 87.9 kg (193 lb 12.8 oz)   02/04/25 87.5 kg (193 lb)   01/31/25 87.5 kg (192 lb 12.8 oz)      There is no height or weight on file to calculate BMI.     Assessment/Plan   Problem List Items Addressed This Visit       Type 2 diabetes mellitus without complication, without long-term current use of insulin (Multi)     Patient's goal A1c is < 7%.  Is pt at goal? Yes, 6.2  Patient's SMBGs are 137 this morning. Patient saw PCP yesterday who stopped metformin and sent for increased dose of Mounjaro.  Rationale for plan: A1C at goal, Treatment per PCP, follow up in 3 weeks    Medication Changes:  CONTINUE:  Farxiga 10 mg daily  INCREASE  Mounjaro 7.5 to 10 mg once weekly         Relevant Orders    Referral to Clinical Pharmacy     Clinical Pharmacist follow-up: 3/25 at 9:40, Telehealth visit    Continue all meds under the continuation of care with the referring provider and clinical pharmacy team.    Thank you,  Noris Pastor, PharmD, [facility]  Clinical Pharmacy Specialist  [phone]    Verbal consent to manage patient's drug therapy was obtained from the patient. They were informed they may decline to participate or withdraw from participation in pharmacy services at any time.

## 2025-03-04 NOTE — Clinical Note
You saw patient yesterday, very happy to see new A1C, will follow up with patient in 3 weeks to see how she tolerates increased dose of Mounjaro and being off the metformin.

## 2025-03-10 DIAGNOSIS — Z00.00 ROUTINE GENERAL MEDICAL EXAMINATION AT HEALTH CARE FACILITY: ICD-10-CM

## 2025-03-10 RX ORDER — CETIRIZINE HYDROCHLORIDE 10 MG/1
10 TABLET ORAL DAILY
Qty: 60 TABLET | Refills: 3 | Status: SHIPPED | OUTPATIENT
Start: 2025-03-10

## 2025-03-15 DIAGNOSIS — M32.9 SYSTEMIC LUPUS ERYTHEMATOSUS, UNSPECIFIED SLE TYPE, UNSPECIFIED ORGAN INVOLVEMENT STATUS (MULTI): Primary | ICD-10-CM

## 2025-03-17 RX ORDER — HYDROXYCHLOROQUINE SULFATE 200 MG/1
TABLET, FILM COATED ORAL DAILY
Qty: 30 TABLET | Refills: 0 | Status: SHIPPED | OUTPATIENT
Start: 2025-03-17

## 2025-03-19 ENCOUNTER — HOSPITAL ENCOUNTER (OUTPATIENT)
Dept: RADIOLOGY | Facility: HOSPITAL | Age: 75
Discharge: HOME | End: 2025-03-19
Payer: MEDICARE

## 2025-03-19 VITALS — HEIGHT: 65 IN | WEIGHT: 193 LBS | BODY MASS INDEX: 32.15 KG/M2

## 2025-03-19 DIAGNOSIS — Z12.31 BREAST CANCER SCREENING BY MAMMOGRAM: ICD-10-CM

## 2025-03-19 PROCEDURE — 77063 BREAST TOMOSYNTHESIS BI: CPT | Performed by: RADIOLOGY

## 2025-03-19 PROCEDURE — 77063 BREAST TOMOSYNTHESIS BI: CPT

## 2025-03-19 PROCEDURE — 77067 SCR MAMMO BI INCL CAD: CPT | Performed by: RADIOLOGY

## 2025-03-24 ENCOUNTER — APPOINTMENT (OUTPATIENT)
Dept: NUTRITION | Facility: CLINIC | Age: 75
End: 2025-03-24
Payer: MEDICARE

## 2025-03-24 VITALS — HEIGHT: 65 IN | BODY MASS INDEX: 31.32 KG/M2 | WEIGHT: 188 LBS

## 2025-03-24 DIAGNOSIS — E11.9 TYPE 2 DIABETES MELLITUS WITHOUT COMPLICATION, WITHOUT LONG-TERM CURRENT USE OF INSULIN: Primary | ICD-10-CM

## 2025-03-24 PROCEDURE — 97803 MED NUTRITION INDIV SUBSEQ: CPT | Performed by: DIETITIAN, REGISTERED

## 2025-03-24 NOTE — PROGRESS NOTES
"Nutrition Follow Up Assessment:     Patient Yolanda Zabala is a 75 y.o. female being seen via virtual visit, phone call only who was initially referred by Dr. Lincoln  on 11/12/24 for   1. Type 2 diabetes mellitus without complication, without long-term current use of insulin (Multi)          Last nutrition visit was completed on 1/17/25 with previous goals set by RDN & pt:   She will use the Plate Method to guide amount/type of foods on her plate  She will continue to cook without added salt and purchase foods low in sodium when she has the opportunity.    Nutrition Assessment      Nutrition History:  Food & Nutrition History: She reports she continues to carefully follow her diet. Had blood sugar of 69 mg/dL one night - she treated the hypoglycemia with a bowl of cereal, and then with scrambled eggs.  Food Allergies:  (none)  Nutrition-Related Medication Use: Prescription Medication Use: she said Metformin was discontinued. She continues taking Mounjaro.    Diet Recall:  Meal 1: B: continues to eat breakfast  Meal 2: Dinner: we didn't discuss details today.  Snack 2: HS: nuts, fruit sometimes. She has questions today about bedtime snack to prevent hypoglycemia.  Energy Intake: Fair 50-75 %    Food Preparation:  Cooking: Patient  Grocery Shopping: Patient  Dining Out: Rare to None    Anthropometrics:  Height: 165.1 cm (5' 5\")   Weight: 85.3 kg (188 lb) (today per patient at home)   BMI (Calculated): 31.28                 Weight History:   Daily Weight  03/24/25 : 85.3 kg (188 lb)  03/19/25 : 87.5 kg (193 lb)  03/03/25 : 87.9 kg (193 lb 12.8 oz)  02/04/25 : 87.5 kg (193 lb)  01/31/25 : 87.5 kg (192 lb 12.8 oz)  12/03/24 : 91.8 kg (202 lb 6.4 oz)  10/21/24 : 94.5 kg (208 lb 6.4 oz)  09/24/24 : 93.3 kg (205 lb 9.6 oz)  06/24/24 : 96.8 kg (213 lb 6.4 oz)  05/30/24 : 98.7 kg (217 lb 9.6 oz)    Weight Change %:  Weight History / % Weight Change: 17# / 8.2% weight loss in 7 months (9/24/24 -> 3/24/25) She was " 263# 11/2021 so overall she has lost 75#    Nutrition Focused Physical Exam Findings:  Defer due to virtual visit    Nutrition Significant Labs:  CBC Trend:   Recent Labs     01/27/25  0000 09/24/24  1552 11/30/23  1056 03/22/21  1215 01/26/21  1115   WBC 8.4 7.8 9.0   < > 6.2   NRBC  --  0.0 0.0  --  0.0   RBC 4.23 4.27 4.63   < > 4.07   HGB 13.1 13.0 13.9   < > 12.3   HCT 39.9 39.9 42.8   < > 41.1   MCV 94.3 93 92   < > 101*   MCH 31.0 30.4 30.0  --   --    MCHC 32.8 32.6 32.5   < > 29.9*   RDW 13.9 14.4 14.0   < > 14.8*    210 207   < > 240    < > = values in this interval not displayed.   , RFP + Serum Mag Trend:   Recent Labs     01/27/25  0000 09/24/24  1552 06/18/24  1058 01/08/24  1118 09/25/20  1228 09/22/20  1236   GLUCOSE 121 122* 273* 181*   < > 55*    139 139 141   < > 142   K 4.7 4.9 4.7 5.3   < > 5.0    104 100 102   < > 107   CO2 27 22 26 27   < > 27   ANIONGAP  --  18 18 17   < > 13   BUN 47* 30* 34* 26*   < > 35*   CREATININE 1.23* 0.67 1.01 0.99   < > 0.94   EGFR 47* >90 59* 61   < >  --    CALCIUM 9.6 10.1 10.6* 10.1   < > 10.1   PHOS 3.6  --  4.0 4.0   < >  --    ALBUMIN 4.3 4.1 4.4 3.9   < >  --    MG  --  1.56* 1.66  --   --  1.59*    < > = values in this interval not displayed.   , LFT Trend:   Recent Labs     01/27/25  0000 09/24/24  1552 06/18/24  1058 01/08/24  1118 11/30/23  1056 08/11/23  1440 04/19/23  1211 03/20/23  1053 11/01/21  0942 07/27/21  1024   ALBUMIN 4.3 4.1 4.4   < > 4.2 4.2   < > 3.8   < > 4.2   BILITOT  --  0.7  --   --  0.7 0.6   < > 0.7   < > 0.4   BILIDIR  --   --   --   --   --  0.1  --  0.2  --  0.1   ALKPHOS  --  109  --   --  105 138*   < > 144*   < > 84   ALT  --  43  --   --  45 61*   < > 55*   < > 36   AST  --  43*  --   --  26 54*   < > 52*   < > 27   PROT  --  7.2  --   --  7.7 8.6*   < > 7.9   < > 7.4    < > = values in this interval not displayed.   , DM Specific Labs Trend (Includes HgbA1C, antibodies & fasting insulin):   Recent Labs      "03/03/25  1136 12/03/24  1514 09/24/24  1514   HGBA1C 6.2 7.6* 12.4*   , Lipid Panel Trend:    Recent Labs     09/24/24  1552 11/30/23  1056 08/08/22  0912 09/21/21  0909   CHOL 119 122 154 164   HDL 31.8 39.8 39.5* 47.7   LDLCALC 21 49  --   --    LDLF  --   --  70 78   VLDL 66* 34 44* 38   TRIG 331* 168* 222* 190*   , Iron Panel + Serum Ferritin Trend:   Recent Labs     01/27/25  0000 08/11/23  1440   IRON 75 56   TIBC 384 420   IRONSAT 20 13*   FERRITIN 252 333*   , Vitamin B12: No results found for: \"QKBSDECC70\" , Folate: No results found for: \"FOLATE\" , and Vitamin D:   Lab Results   Component Value Date    VITD25 37 01/27/2025      Medications:  Current Outpatient Medications   Medication Instructions    albuterol 90 mcg/actuation inhaler 2 puffs, inhalation, Every 6 hours PRN    allopurinol (ZYLOPRIM) 300 mg, oral, Daily    atorvastatin (LIPITOR) 20 mg, oral, Daily    blood sugar diagnostic strip Please prescribe strips compatible with one touch verio    blood-glucose meter misc Blood sugar checks bid , one touch verio    cetirizine (ZYRTEC) 10 mg, oral, Daily    dapagliflozin propanediol (Farxiga) 10 mg Take two  5 mg tablets orally daily    flash glucose sensor kit (FreeStyle Mikhail 2 Sensor) kit Use as instructed    fluticasone (Flonase) 50 mcg/actuation nasal spray 1 spray, Each Nostril, Daily, Shake gently. Before first use, prime pump. After use, clean tip and replace cap.    hydroxychloroquine (Plaquenil) 200 mg tablet oral, Daily    latanoprost (Xalatan) 0.005 % ophthalmic solution INSTILL 1 DROP INTO EACH EYE ONCE DAILY AT NIGHT    levothyroxine (SYNTHROID, LEVOXYL) 100 mcg, oral, Daily    losartan (COZAAR) 50 mg, oral, 2 times daily    magnesium oxide 400 mg, oral, Daily    metoprolol succinate XL (TOPROL-XL) 50 mg, oral, Daily, Do not crush or chew.    montelukast (SINGULAIR) 10 mg, oral, Nightly    Mounjaro 10 mg, subcutaneous, Weekly    OneTouch Delica Plus Lancet 33 gauge misc USE 1 LANCET TO " CHECK GLUCOSE TWICE DAILY      Nutrition Diagnosis   Malnutrition Diagnosis  Patient has Malnutrition Diagnosis: No    Nutrition Diagnosis  Patient has Nutrition Diagnosis: Yes  Diagnosis Status (1): Active  Nutrition Diagnosis 1: Food and nutrition related knowledge deficit  Related to (1): lack of adequate exposure to nutrition-related information  As Evidenced by (1): patient asks questions about diet to manage diabetes and CKD     Nutrition Interventions/Recommendations   Nutrition Prescription: Oral nutrition carbohydrate-controlled, low sodium diet    Nutrition Interventions:   Food and Nutrient Delivery: Meals & Snacks: Modify composition of oral intake  Goals: 1. She will     Coordination of Care: Collaboration and referral of nutrition care:  (N/A)     Nutrition Education:   Nutrition Education Content: Content related nutrition education    Educational Handouts Provided: ADA Snacks and ADA Hypoglycemia    Nutrition Counseling:   Nutrition Counseling Strategies : Nutrition counseling based on goal setting strategy    Readiness to Change : Good  Level of Understanding : Good  Anticipated Compliant : Good       Nutrition Monitoring and Evaluation   Food and Nutrient Intake  Monitoring and Evaluation Plan: Meal/snack pattern  Meal/Snack Pattern: Estimated meal and snack pattern  Criteria: at least 2 meals/day + bedtime snack                             Follow Up: PRN, she'll call as needed    Time Spent  Prep time on day of patient encounter: 5 minutes  Time spent directly with patient, family or caregiver: 29 minutes  Additional Time Spent on Patient Care Activities: 2 minutes  Documentation Time: 5 minutes  Other Time Spent: 0 minutes  Total: 41 minutes

## 2025-03-24 NOTE — PATIENT INSTRUCTIONS
Discussed the treatment of hypoglycemia. Advised that half a cup of juice would be an effective way to treat hypoglycemia. Also gave her ideas of bedtime snacks she can consume to help prevent a low blood sugar level overnight, suggested 15-30 grams of carbohydrate for an evening snack.   Discussed coffee consumption, reassured her that 1-2 mugs of coffee/day are safe for her to consume.

## 2025-03-25 ENCOUNTER — APPOINTMENT (OUTPATIENT)
Dept: PHARMACY | Facility: HOSPITAL | Age: 75
End: 2025-03-25
Payer: MEDICARE

## 2025-03-25 DIAGNOSIS — E11.9 TYPE 2 DIABETES MELLITUS WITHOUT COMPLICATION, WITHOUT LONG-TERM CURRENT USE OF INSULIN: ICD-10-CM

## 2025-03-25 NOTE — PROGRESS NOTES
Clinical Pharmacy Appointment    Patient ID: Yolanda Zabala is a 75 y.o. female who presents for Diabetes.    Pt is here for Follow Up appointment.     Referring Provider/PCP: Caridad Lincoln*  Last visit with PCP: 3/3/25  Next visit with PCP: 9/8/25    Subjective     HPI  DIABETES MELLITUS TYPE II:    Diagnosed with diabetes: at least 2018.   Known diabetic complications: None.  Does patient follow with Endocrinology: No  Last optometry exam: 10/22/24  Most recent visit in Podiatry: Every 10 weeks  Current Weight: 186 lbs     Current diabetic medications include:  Farxiga 10 mg daily  Mounjaro 10 mg weekly on Monday x 3    Clarifications to above regimen: none  Adverse Effects: Occasional bowel urgency    Past diabetic medications include:  Actos 30 mg  Glimepiride 4 mg daily  Metformin 1000 mg twice daily    Glucose Readings:  Glucometer/CGM Type: Freestyle 2 (Uses reader)  Patient tests BG continuously times per day    Current home BG readings: 140's for the most part  Previous home BG readings:   137 this morning  133 this morning  around 140 this morning, Down to 98 overnight night  117 this morning  Went up because sick, currently 148 after eating.   140-150    Any episodes of hypoglycemia? Yes, 1 night went down to 69 .  Did patient treat episode of hypoglycemia appropriately? Yes, bought some orange juice, thinking about getting juice boxes  Does the patient have a prescription for ready-to-use Glucagon? Not on insulin  Does pt have proteinuria? Yes    Lifestyle: (Not as hungry, has night time cravings)  Diet: Unknown meals/day.  BK: Unknown  LN: Unknown  DN: Unknown  Snacks: Unknown  Drinks: Unknown  Physical Activity: Unknown    Secondary Prevention:  Statin? Yes  ACE-I/ARB? Yes  Aspirin? No    Pertinent PMH Review:  PMH of Pancreatitis: No  PMH of Retinopathy: No  PMH of Urinary Tract Infections: No  PMH of MTC: No    Medication Reconciliation:  Changed:   Added:   Discontinued:     Drug  Interactions  No relevant drug interactions were noted.    Medication System Management  Patients preferred pharmacy: [facility]  Adherence/Organization: None  Affordability/Accessibility: None      Objective   Allergies   Allergen Reactions    Ace Inhibitors Unknown    Bacitracin Unknown    Penicillins Unknown     can't remember    Phenytoin Unknown    Sulfamethoxazole-Trimethoprim Unknown     Social History     Social History Narrative    Not on file      Medication Review  Current Outpatient Medications   Medication Instructions    albuterol 90 mcg/actuation inhaler 2 puffs, inhalation, Every 6 hours PRN    allopurinol (ZYLOPRIM) 300 mg, oral, Daily    atorvastatin (LIPITOR) 20 mg, oral, Daily    blood sugar diagnostic strip Please prescribe strips compatible with one touch verio    blood-glucose meter misc Blood sugar checks bid , one touch verio    cetirizine (ZYRTEC) 10 mg, oral, Daily    dapagliflozin propanediol (Farxiga) 10 mg Take two  5 mg tablets orally daily    flash glucose sensor kit (FreeStyle Mikhail 2 Sensor) kit Use as instructed    fluticasone (Flonase) 50 mcg/actuation nasal spray 1 spray, Each Nostril, Daily, Shake gently. Before first use, prime pump. After use, clean tip and replace cap.    hydroxychloroquine (Plaquenil) 200 mg tablet oral, Daily    latanoprost (Xalatan) 0.005 % ophthalmic solution INSTILL 1 DROP INTO EACH EYE ONCE DAILY AT NIGHT    levothyroxine (SYNTHROID, LEVOXYL) 100 mcg, oral, Daily    losartan (COZAAR) 50 mg, oral, 2 times daily    magnesium oxide 400 mg, oral, Daily    metoprolol succinate XL (TOPROL-XL) 50 mg, oral, Daily, Do not crush or chew.    montelukast (SINGULAIR) 10 mg, oral, Nightly    Mounjaro 10 mg, subcutaneous, Weekly    OneTouch Delica Plus Lancet 33 gauge misc USE 1 LANCET TO CHECK GLUCOSE TWICE DAILY      Vitals  BP Readings from Last 2 Encounters:   03/03/25 100/58   02/04/25 89/62     BMI Readings from Last 1 Encounters:   03/24/25 31.28 kg/m²       Labs  A1C  Lab Results   Component Value Date    HGBA1C 6.2 03/03/2025    HGBA1C 7.6 (A) 12/03/2024    HGBA1C 12.4 (A) 09/24/2024     BMP  Lab Results   Component Value Date    CALCIUM 9.6 01/27/2025     01/27/2025    K 4.7 01/27/2025    CO2 27 01/27/2025     01/27/2025    BUN 47 (H) 01/27/2025    CREATININE 1.23 (H) 01/27/2025    EGFR 47 (L) 01/27/2025     LFTs  Lab Results   Component Value Date    ALT 43 09/24/2024    AST 43 (H) 09/24/2024    ALKPHOS 109 09/24/2024    BILITOT 0.7 09/24/2024     FLP  Lab Results   Component Value Date    TRIG 331 (H) 09/24/2024    CHOL 119 09/24/2024    LDLF 70 08/08/2022    LDLCALC 21 09/24/2024    HDL 31.8 09/24/2024     Urine Microalbumin  Lab Results   Component Value Date    MICROALBCREA 12 01/27/2025     Weight Management  Wt Readings from Last 3 Encounters:   03/24/25 85.3 kg (188 lb)   03/19/25 87.5 kg (193 lb)   03/03/25 87.9 kg (193 lb 12.8 oz)      There is no height or weight on file to calculate BMI.     Assessment/Plan   Problem List Items Addressed This Visit       Type 2 diabetes mellitus without complication, without long-term current use of insulin (Multi)     Patient's goal A1c is < 7%.  Is pt at goal? Yes, 6.2  Patient's SMBGs are 140's. Patient has stopped metformin and doing well on Mounjaro. Patient would like to stay on 10 mg at this time.  Rationale for plan: A1C at goal, fasting sugars reports slightly high, continue at 10 mg at this time per patient wishes.    Medication Changes:  CONTINUE:  Farxiga 10 mg daily  Mounjaro 10 mg once weekly         Relevant Orders    Referral to Clinical Pharmacy       Clinical Pharmacist follow-up: 5/20 at 9:40, Telehealth visit    Continue all meds under the continuation of care with the referring provider and clinical pharmacy team.    Thank you,  Noris Pastor, PharmD, [facility]  Clinical Pharmacy Specialist  [phone]    Verbal consent to manage patient's drug therapy was obtained from the patient.  They were informed they may decline to participate or withdraw from participation in pharmacy services at any time.

## 2025-03-25 NOTE — Clinical Note
Patient doing well since stopping metformin. Reports fasting sugars around 140, A1C a few weeks ago was 6.2, will keep Mounjaro at 10 mg at this time and follow up in 2 months to see if that will need titrated up to stay at goal.

## 2025-03-25 NOTE — ASSESSMENT & PLAN NOTE
Patient's goal A1c is < 7%.  Is pt at goal? Yes, 6.2  Patient's SMBGs are 140's. Patient has stopped metformin and doing well on Mounjaro. Patient would like to stay on 10 mg at this time.  Rationale for plan: A1C at goal, fasting sugars reports slightly high, continue at 10 mg at this time per patient wishes.    Medication Changes:  CONTINUE:  Farxiga 10 mg daily  Mounjaro 10 mg once weekly

## 2025-03-27 DIAGNOSIS — E11.9 TYPE 2 DIABETES MELLITUS WITHOUT COMPLICATION, WITHOUT LONG-TERM CURRENT USE OF INSULIN: ICD-10-CM

## 2025-03-27 RX ORDER — FLASH GLUCOSE SENSOR
KIT MISCELLANEOUS
Qty: 6 EACH | Refills: 0 | Status: SHIPPED | OUTPATIENT
Start: 2025-03-27 | End: 2025-03-28 | Stop reason: SDUPTHER

## 2025-03-28 DIAGNOSIS — E11.9 TYPE 2 DIABETES MELLITUS WITHOUT COMPLICATION, WITHOUT LONG-TERM CURRENT USE OF INSULIN: ICD-10-CM

## 2025-03-28 RX ORDER — FLASH GLUCOSE SENSOR
KIT MISCELLANEOUS
Qty: 6 EACH | Refills: 0 | Status: SHIPPED | OUTPATIENT
Start: 2025-03-28

## 2025-03-28 RX ORDER — TIRZEPATIDE 10 MG/.5ML
10 INJECTION, SOLUTION SUBCUTANEOUS WEEKLY
Qty: 2 ML | Refills: 11 | Status: SHIPPED | OUTPATIENT
Start: 2025-03-28 | End: 2026-03-28

## 2025-04-03 DIAGNOSIS — E11.9 TYPE 2 DIABETES MELLITUS WITHOUT COMPLICATION, WITHOUT LONG-TERM CURRENT USE OF INSULIN: ICD-10-CM

## 2025-04-03 DIAGNOSIS — E11.9 TYPE 2 DIABETES MELLITUS WITHOUT COMPLICATION, WITHOUT LONG-TERM CURRENT USE OF INSULIN: Primary | ICD-10-CM

## 2025-04-03 DIAGNOSIS — N18.31 STAGE 3A CHRONIC KIDNEY DISEASE (MULTI): ICD-10-CM

## 2025-04-03 RX ORDER — DAPAGLIFLOZIN 10 MG/1
10 TABLET, FILM COATED ORAL DAILY
Qty: 90 TABLET | Refills: 3 | Status: SHIPPED | OUTPATIENT
Start: 2025-04-03 | End: 2026-04-03

## 2025-04-03 RX ORDER — DAPAGLIFLOZIN 10 MG/1
TABLET, FILM COATED ORAL
Qty: 60 TABLET | Refills: 0 | Status: SHIPPED | OUTPATIENT
Start: 2025-04-03

## 2025-04-14 ENCOUNTER — APPOINTMENT (OUTPATIENT)
Dept: RHEUMATOLOGY | Facility: CLINIC | Age: 75
End: 2025-04-14
Payer: MEDICARE

## 2025-04-14 VITALS
HEIGHT: 65 IN | WEIGHT: 189 LBS | BODY MASS INDEX: 31.49 KG/M2 | OXYGEN SATURATION: 96 % | SYSTOLIC BLOOD PRESSURE: 95 MMHG | HEART RATE: 66 BPM | DIASTOLIC BLOOD PRESSURE: 64 MMHG

## 2025-04-14 DIAGNOSIS — M32.9 SYSTEMIC LUPUS ERYTHEMATOSUS, UNSPECIFIED SLE TYPE, UNSPECIFIED ORGAN INVOLVEMENT STATUS (MULTI): ICD-10-CM

## 2025-04-14 DIAGNOSIS — M72.2 PLANTAR FASCIITIS OF LEFT FOOT: Primary | ICD-10-CM

## 2025-04-14 DIAGNOSIS — Z79.899 LONG-TERM USE OF PLAQUENIL: ICD-10-CM

## 2025-04-14 PROCEDURE — 99213 OFFICE O/P EST LOW 20 MIN: CPT | Performed by: INTERNAL MEDICINE

## 2025-04-14 PROCEDURE — 3074F SYST BP LT 130 MM HG: CPT | Performed by: INTERNAL MEDICINE

## 2025-04-14 PROCEDURE — 3044F HG A1C LEVEL LT 7.0%: CPT | Performed by: INTERNAL MEDICINE

## 2025-04-14 PROCEDURE — 4010F ACE/ARB THERAPY RXD/TAKEN: CPT | Performed by: INTERNAL MEDICINE

## 2025-04-14 PROCEDURE — 3008F BODY MASS INDEX DOCD: CPT | Performed by: INTERNAL MEDICINE

## 2025-04-14 PROCEDURE — G2211 COMPLEX E/M VISIT ADD ON: HCPCS | Performed by: INTERNAL MEDICINE

## 2025-04-14 PROCEDURE — 1157F ADVNC CARE PLAN IN RCRD: CPT | Performed by: INTERNAL MEDICINE

## 2025-04-14 PROCEDURE — 1126F AMNT PAIN NOTED NONE PRSNT: CPT | Performed by: INTERNAL MEDICINE

## 2025-04-14 PROCEDURE — 1159F MED LIST DOCD IN RCRD: CPT | Performed by: INTERNAL MEDICINE

## 2025-04-14 PROCEDURE — 3078F DIAST BP <80 MM HG: CPT | Performed by: INTERNAL MEDICINE

## 2025-04-14 RX ORDER — HYDROXYCHLOROQUINE SULFATE 200 MG/1
200 TABLET, FILM COATED ORAL DAILY
Qty: 90 TABLET | Refills: 3 | Status: SHIPPED | OUTPATIENT
Start: 2025-04-14 | End: 2026-04-14

## 2025-04-14 ASSESSMENT — ENCOUNTER SYMPTOMS
LOSS OF SENSATION IN FEET: 0
FATIGUE: 1
OCCASIONAL FEELINGS OF UNSTEADINESS: 0
DEPRESSION: 0

## 2025-04-14 ASSESSMENT — PAIN SCALES - GENERAL: PAINLEVEL_OUTOF10: 0-NO PAIN

## 2025-04-14 NOTE — PROGRESS NOTES
"Subjective   Patient ID: Yolanda Zabala is a 75 y.o. female who presents for Follow-up.  HPI  Patient with history of systemic lupus and history of lupus nephritis previous patient of Dr. Blackmon.  Patient says that she had a biopsy in the 80s and was told that she had lupus.  Also history of obstructive sleep apnea, type 2 diabetes, hypertension, hypothyroidism, heart block.    Patient was last seen in October and at that time we had her  Continue with hydroxychloroquine.  She does see eye providers on a regular basis.  She has seen podiatry for pain in her heel.  Also has a history of diabetes.  Has been on semaglutide.  Was felt to have left plantar fasciitis.  Her right hand will be numb at times.  She will be moving to new housing soon.  There are events/activities that they have there.  She feels her normal self  Right hand  will get umb  Will be moving    Review of Systems   Constitutional:  Positive for fatigue.   HENT:          Sinus congested, allergies   Musculoskeletal:         Left heel pain.           Visit Vitals  BP 95/64   Pulse 66   Ht 1.651 m (5' 5\")   Wt 85.7 kg (189 lb)   SpO2 96%   BMI 31.45 kg/m²   OB Status Postmenopausal   Smoking Status Never   BSA 1.98 m²       Objective   Physical Exam  GEN: NAD A&O x3 appropriate affect some difficulty getting onto the exam table  HENT:no enlarged glands or thyroid  EYES:  no conjunctival redness, eyelids normal  LYMPH: no cervical  lymphadenopathy  CV: RRR, no MRG, no lower extremity edema  RESP: CTA B/L with normal respiratory effort and no intercostal retractions  TENDER POINTS: 0/18   MSK:  Right knee enlargement left knee replacement  No swelling or tenderness in the hands wrist elbows shoulders  Kyphosis of the thoracic spine    Assessment/Plan   Patient with history of lupus    Systemic lupus.  Her kidney disease seems to be more consistent with diabetic nephropathy rather than lupus nephritis.   - I feel that she is currently stable.  Will do " blood work concerning her lupus diagnosis.  She does see nephrology feels that her kidney disease may have to do with diabetes more than with lupus.  Will have her continue with hydroxychloroquine.  She does see the eye provider on a regular basis.    We can see her back in 1 year or as needed

## 2025-04-15 ENCOUNTER — TELEPHONE (OUTPATIENT)
Dept: RHEUMATOLOGY | Facility: CLINIC | Age: 75
End: 2025-04-15
Payer: MEDICARE

## 2025-04-16 LAB
ANA PAT SER IF-IMP: ABNORMAL
ANA PAT SER IF-IMP: ABNORMAL
ANA SER QL IF: POSITIVE
ANA TITR SER IF: ABNORMAL TITER
ANA TITR SER IF: ABNORMAL TITER
BASOPHILS # BLD AUTO: 80 CELLS/UL (ref 0–200)
BASOPHILS NFR BLD AUTO: 1.1 %
C3 SERPL-MCNC: 156 MG/DL (ref 83–193)
C4 SERPL-MCNC: 21 MG/DL (ref 15–57)
CENTROMERE B AB SER-ACNC: ABNORMAL AI
CRP SERPL-MCNC: <3 MG/L
DSDNA AB SER-ACNC: 1 IU/ML
ENA JO1 AB SER IA-ACNC: ABNORMAL AI
ENA RNP AB SER-ACNC: ABNORMAL AI
ENA SCL70 AB SER IA-ACNC: ABNORMAL AI
ENA SM AB SER IA-ACNC: ABNORMAL AI
ENA SM+RNP AB SER IA-ACNC: ABNORMAL AI
ENA SS-A AB SER IA-ACNC: ABNORMAL AI
ENA SS-B AB SER IA-ACNC: ABNORMAL AI
EOSINOPHIL # BLD AUTO: 409 CELLS/UL (ref 15–500)
EOSINOPHIL NFR BLD AUTO: 5.6 %
ERYTHROCYTE [DISTWIDTH] IN BLOOD BY AUTOMATED COUNT: 13.3 % (ref 11–15)
ERYTHROCYTE [SEDIMENTATION RATE] IN BLOOD BY WESTERGREN METHOD: 43 MM/H
HCT VFR BLD AUTO: 40.6 % (ref 35–45)
HGB BLD-MCNC: 13.4 G/DL (ref 11.7–15.5)
LABORATORY COMMENT REPORT: ABNORMAL
LYMPHOCYTES # BLD AUTO: 2351 CELLS/UL (ref 850–3900)
LYMPHOCYTES NFR BLD AUTO: 32.2 %
MCH RBC QN AUTO: 30.8 PG (ref 27–33)
MCHC RBC AUTO-ENTMCNC: 33 G/DL (ref 32–36)
MCV RBC AUTO: 93.3 FL (ref 80–100)
MONOCYTES # BLD AUTO: 562 CELLS/UL (ref 200–950)
MONOCYTES NFR BLD AUTO: 7.7 %
NEUTROPHILS # BLD AUTO: 3898 CELLS/UL (ref 1500–7800)
NEUTROPHILS NFR BLD AUTO: 53.4 %
NUCLEOSOME AB SER IA-ACNC: ABNORMAL AI
PLATELET # BLD AUTO: 222 THOUSAND/UL (ref 140–400)
PMV BLD REES-ECKER: 9.5 FL (ref 7.5–12.5)
RBC # BLD AUTO: 4.35 MILLION/UL (ref 3.8–5.1)
RIBOSOMAL P AB SER-ACNC: ABNORMAL AI
WBC # BLD AUTO: 7.3 THOUSAND/UL (ref 3.8–10.8)

## 2025-04-29 ENCOUNTER — TELEPHONE (OUTPATIENT)
Dept: NUTRITION | Facility: CLINIC | Age: 75
End: 2025-04-29
Payer: MEDICARE

## 2025-04-29 NOTE — TELEPHONE ENCOUNTER
Yolanda called me today and asked for a return call. I called her back. She thought I was the pharmacist and she had a question about her dose of Mounjaro. I told her I would forward this message to the clinical pharmacist so that they could discuss the dose. She said she is taking 10 mg weekly but she got a new box and has questions.     I asked how her blood sugar has been going - she said she has had a few low readings in the middle of the night. She has treated lows with glucose tablets. I made her aware that compression lows can happen so she can be mindful whether the low readings are happening if she is sleeping on it.     She said she's moving into a senior apartment soon, she anticipates she will be finished with the move in the next month. She said her food choices have been a little less careful since she's preparing for the move but she has remained diligent to avoid fast food and chips.

## 2025-05-06 ENCOUNTER — APPOINTMENT (OUTPATIENT)
Dept: PRIMARY CARE | Facility: CLINIC | Age: 75
End: 2025-05-06
Payer: MEDICARE

## 2025-05-17 DIAGNOSIS — E03.9 HYPOTHYROIDISM, ADULT: ICD-10-CM

## 2025-05-19 RX ORDER — LEVOTHYROXINE SODIUM 100 UG/1
100 TABLET ORAL DAILY
Qty: 90 TABLET | Refills: 0 | Status: SHIPPED | OUTPATIENT
Start: 2025-05-19

## 2025-05-20 ENCOUNTER — APPOINTMENT (OUTPATIENT)
Dept: PHARMACY | Facility: HOSPITAL | Age: 75
End: 2025-05-20
Payer: MEDICARE

## 2025-05-20 DIAGNOSIS — E11.9 TYPE 2 DIABETES MELLITUS WITHOUT COMPLICATION, WITHOUT LONG-TERM CURRENT USE OF INSULIN: ICD-10-CM

## 2025-05-20 NOTE — PROGRESS NOTES
Clinical Pharmacy Appointment    Patient ID: Yolanda Zabala is a 75 y.o. female who presents for Diabetes.    Pt is here for Follow Up appointment.     Referring Provider/PCP: Caridad Lincoln*  Last visit with PCP: 3/3/25  Next visit with PCP: 9/8/25    Subjective     HPI  DIABETES MELLITUS TYPE II:    Diagnosed with diabetes: at least 2018.   Known diabetic complications: None.  Does patient follow with Endocrinology: No  Last optometry exam: 10/22/24  Most recent visit in Podiatry: Every 10 weeks  Current Weight: 184 lbs     Current diabetic medications include:  Farxiga 10 mg daily  Mounjaro 10 mg weekly on Monday    Clarifications to above regimen: none  Adverse Effects: Occasional bowel urgency    Past diabetic medications include:  Actos 30 mg  Glimepiride 4 mg daily  Metformin 1000 mg twice daily    Glucose Readings:  Glucometer/CGM Type: Freestyle 2 (Uses reader)  Patient tests BG continuously times per day    Current home BG readings: 109 today  Previous home BG readings:   140's for the most part  137 this morning  133 this morning  around 140 this morning, Down to 98 overnight night  117 this morning  Went up because sick, currently 148 after eating.   140-150    Any episodes of hypoglycemia? Yes,  .  Did patient treat episode of hypoglycemia appropriately? Yes, bought some orange juice, thinking about getting juice boxes  Does the patient have a prescription for ready-to-use Glucagon? Not on insulin  Does pt have proteinuria? Yes    Lifestyle: (Having more cravings)  Diet: Unknown meals/day.  BK: Unknown  LN: Unknown  DN: Unknown  Snacks: Unknown  Drinks: Unknown  Physical Activity: Unknown    Secondary Prevention:  Statin? Yes  ACE-I/ARB? Yes  Aspirin? No    Pertinent PMH Review:  PMH of Pancreatitis: No  PMH of Retinopathy: No  PMH of Urinary Tract Infections: No  PMH of MTC: No    Medication Reconciliation:  Changed:   Added:   Discontinued:     Drug Interactions  No relevant drug  interactions were noted.    Medication System Management  Patients preferred pharmacy: [facility]  Adherence/Organization: None  Affordability/Accessibility: None      Objective   Allergies   Allergen Reactions    Ace Inhibitors Unknown    Bacitracin Unknown    Penicillins Unknown     can't remember    Phenytoin Unknown    Sulfamethoxazole-Trimethoprim Unknown     Social History     Social History Narrative    Not on file      Medication Review  Current Outpatient Medications   Medication Instructions    albuterol 90 mcg/actuation inhaler 2 puffs, inhalation, Every 6 hours PRN    allopurinol (ZYLOPRIM) 300 mg, oral, Daily    atorvastatin (LIPITOR) 20 mg, oral, Daily    blood sugar diagnostic strip Please prescribe strips compatible with one touch verio    blood-glucose meter misc Blood sugar checks bid , one touch verio    cetirizine (ZYRTEC) 10 mg, oral, Daily    dapagliflozin propanediol (FARXIGA) 10 mg, oral, Daily    Farxiga 10 mg tablet TAKE 1 TABLET ( 10 MG) BY MOUTH ONCE DAILY    fluticasone (Flonase) 50 mcg/actuation nasal spray 1 spray, Each Nostril, Daily, Shake gently. Before first use, prime pump. After use, clean tip and replace cap.    FreeStyle Mikhail 2 Sensor kit USE AS DIRECTED    hydroxychloroquine (PLAQUENIL) 200 mg, oral, Daily    latanoprost (Xalatan) 0.005 % ophthalmic solution INSTILL 1 DROP INTO EACH EYE ONCE DAILY AT NIGHT    levothyroxine (SYNTHROID, LEVOXYL) 100 mcg, oral, Daily    losartan (COZAAR) 50 mg, oral, 2 times daily    magnesium oxide 400 mg, oral, Daily    metoprolol succinate XL (TOPROL-XL) 50 mg, oral, Daily, Do not crush or chew.    montelukast (SINGULAIR) 10 mg, oral, Nightly    Mounjaro 10 mg, subcutaneous, Weekly    OneTouch Delica Plus Lancet 33 gauge misc USE 1 LANCET TO CHECK GLUCOSE TWICE DAILY      Vitals  BP Readings from Last 2 Encounters:   04/14/25 95/64   03/03/25 100/58     BMI Readings from Last 1 Encounters:   04/14/25 31.45 kg/m²      Labs  A1C  Lab Results    Component Value Date    HGBA1C 6.2 03/03/2025    HGBA1C 7.6 (A) 12/03/2024    HGBA1C 12.4 (A) 09/24/2024     BMP  Lab Results   Component Value Date    CALCIUM 9.6 01/27/2025     01/27/2025    K 4.7 01/27/2025    CO2 27 01/27/2025     01/27/2025    BUN 47 (H) 01/27/2025    CREATININE 1.23 (H) 01/27/2025    EGFR 47 (L) 01/27/2025     LFTs  Lab Results   Component Value Date    ALT 43 09/24/2024    AST 43 (H) 09/24/2024    ALKPHOS 109 09/24/2024    BILITOT 0.7 09/24/2024     FLP  Lab Results   Component Value Date    TRIG 331 (H) 09/24/2024    CHOL 119 09/24/2024    LDLF 70 08/08/2022    LDLCALC 21 09/24/2024    HDL 31.8 09/24/2024     Urine Microalbumin  Lab Results   Component Value Date    MICROALBCREA 12 01/27/2025     Weight Management  Wt Readings from Last 3 Encounters:   04/14/25 85.7 kg (189 lb)   03/24/25 85.3 kg (188 lb)   03/19/25 87.5 kg (193 lb)      There is no height or weight on file to calculate BMI.     Assessment/Plan   Problem List Items Addressed This Visit       Type 2 diabetes mellitus without complication, without long-term current use of insulin    Patient's goal A1c is < 7%.  Is pt at goal? Yes, 6.2  Patient's SMBGs are 109 during visit today. Patient recently moved and feels that she has a lot of change going on right now. Shared decision making to stay on current dose for at least 1 more month before thinking about increasing for additional weight loss.  Rationale for plan: A1C at goal, fasting sugar today at goal. continue at 10 mg at this time    Medication Changes:  CONTINUE:  Farxiga 10 mg daily  Mounjaro 10 mg once weekly          Clinical Pharmacist follow-up: 6/17, Telehealth visit    Continue all meds under the continuation of care with the referring provider and clinical pharmacy team.    Thank you,  Noris Pastor, PharmD, [facility]  Clinical Pharmacy Specialist  [phone]    Verbal consent to manage patient's drug therapy was obtained from the patient. They were  informed they may decline to participate or withdraw from participation in pharmacy services at any time.

## 2025-05-20 NOTE — ASSESSMENT & PLAN NOTE
Patient's goal A1c is < 7%.  Is pt at goal? Yes, 6.2  Patient's SMBGs are 109 during visit today. Patient recently moved and feels that she has a lot of change going on right now. Shared decision making to stay on current dose for at least 1 more month before thinking about increasing for additional weight loss.  Rationale for plan: A1C at goal, fasting sugar today at goal. continue at 10 mg at this time    Medication Changes:  CONTINUE:  Farxiga 10 mg daily  Mounjaro 10 mg once weekly

## 2025-05-20 NOTE — Clinical Note
A1C and fasting sugars at goal, patient doing well on Mounjaro 10 mg, lost 2 lbs in last 4 weeks. Will continue on this dose for now.

## 2025-05-27 ENCOUNTER — APPOINTMENT (OUTPATIENT)
Dept: PHARMACY | Facility: HOSPITAL | Age: 75
End: 2025-05-27
Payer: MEDICARE

## 2025-06-17 ENCOUNTER — APPOINTMENT (OUTPATIENT)
Dept: PHARMACY | Facility: HOSPITAL | Age: 75
End: 2025-06-17
Payer: MEDICARE

## 2025-06-17 ENCOUNTER — TELEPHONE (OUTPATIENT)
Dept: PRIMARY CARE | Facility: CLINIC | Age: 75
End: 2025-06-17

## 2025-06-17 DIAGNOSIS — E11.9 TYPE 2 DIABETES MELLITUS WITHOUT COMPLICATION, WITHOUT LONG-TERM CURRENT USE OF INSULIN: ICD-10-CM

## 2025-06-17 RX ORDER — TIRZEPATIDE 7.5 MG/.5ML
7.5 INJECTION, SOLUTION SUBCUTANEOUS WEEKLY
Qty: 2 ML | Refills: 5 | Status: SHIPPED | OUTPATIENT
Start: 2025-06-17

## 2025-06-17 NOTE — PROGRESS NOTES
Patient is sent at the request of Caridad Lincoln* for my opinion regarding Type 2 diabetes.  My final recommendations will be communicated back to the requesting provider by way of shared medical record.    Subjective   HPI    Current diet: well balanced  Current exercise: walking      Allergies[1]    Current monitoring regimen:   Patient is using: continuous glucose monitor  Mikhail 2  Uses reader  -Fasting sugars 100-110    Any episodes of hypoglycemia? yes - 2-3 lows in the past week    Adverse Effects:   Diarrhea - takes metamucil and tried miralax, discussed that these are for constipation. Could try imodium     Objective     There were no vitals taken for this visit.    Diabetes Pharmacotherapy:    Farxiga 10 mg once daily  Mounjaro 10 mg subQ once weekly    Secondary Prevention:   - Statin? Yes  - ACE-I/ARB? Yes  - Aspirin? No    Pertinent PMH Review:  - PMH of Pancreatitis: No  - PMH of Retinopathy: No  - PMH of Urinary Tract Infections: No  - PMH of MTC: No    Lab Review  Lab Results   Component Value Date    BILITOT 0.7 09/24/2024    CALCIUM 9.6 01/27/2025    CO2 27 01/27/2025     01/27/2025    CREATININE 1.23 (H) 01/27/2025    GLUCOSE 121 01/27/2025    ALKPHOS 109 09/24/2024    K 4.7 01/27/2025    PROT 7.2 09/24/2024     01/27/2025    AST 43 (H) 09/24/2024    ALT 43 09/24/2024    BUN 47 (H) 01/27/2025    ANIONGAP 18 09/24/2024    MG 1.56 (L) 09/24/2024    PHOS 3.6 01/27/2025    ALBUMIN 4.3 01/27/2025    LIPASE 53 09/19/2020    GFRF 62 08/11/2023     Lab Results   Component Value Date    TRIG 331 (H) 09/24/2024    CHOL 119 09/24/2024    LDLCALC 21 09/24/2024    HDL 31.8 09/24/2024     Lab Results   Component Value Date    HGBA1C 6.2 03/03/2025    HGBA1C 7.6 (A) 12/03/2024    HGBA1C 12.4 (A) 09/24/2024     The ASCVD Risk score (Ulster DK, et al., 2019) failed to calculate for the following reasons:    The valid total cholesterol range is 130 to 320 mg/dL        Assessment/Plan   Problem  List Items Addressed This Visit    None      Patients diabetes is well controlled with most recent A1c of 6.2% (Goal < 7%). Patient reports 3 weeks of diarrhea. Patient also reports she has been having lows 2-3 times a week. Will decrease dosing of Mounjaro to 7.5 mg. Advised patient to call the office if she continued to have diarrhea or any low blood sugars.   Continue: Farxiga 10 mg once daily  Compliance at present is estimated to be good.   Education Provided to Patient:   Mounjaro Education:   - Counseled patient on MOA, expectations, side effects, duration of therapy, contraindications, administration, and monitoring parameters  - Answered all patient questions and concerns  - Provided detailed dosing and administration counseling to ensure proper technique.   - Reviewed Mounjaro titration schedule, starting with 2.5 mg once weekly to a goal of 15 mg once weekly if tolerated  - Counseled patient on the benefits of GLP-1ra glycemic control and weight loss  - Reviewed storage requirements of Mounjaro when not in use, and when to administer the medication if a dose is missed.  - Advised patient that they may experience improved satiety after meals and portion sizes of meals may be reduced as doses of Mounjaro increase.    Farxiga Education:  - Counseled patient on Farxiga MOA, expectations, side effects, duration of therapy, administration, and monitoring parameters.  - Reviewed the benefits of SGLT-2i therapy, such as glycemic control and kidney and CV protection.  - Advised patient to practice proper  hygiene to reduce risk of UTIs or yeast infections.  - Advised patient to maintain adequate fluid intake to remain hydrated while on SGLT2i therapy.  - Answered all patient questions and concerns.     Follow-up: I recommend diabetes care be 1 month.  PCP Follow-Up: 3 months    Zainab Faye, PharmD, BCPS    Continue all meds under the continuation of care with the referring provider and clinical pharmacy  team.         [1]   Allergies  Allergen Reactions    Ace Inhibitors Unknown    Bacitracin Unknown    Penicillins Unknown     can't remember    Phenytoin Unknown    Sulfamethoxazole-Trimethoprim Unknown

## 2025-06-17 NOTE — TELEPHONE ENCOUNTER
Pt called office stated that her telephone wasn't working properly (but it is now)  and she missed her pharmacy call.  Pt is asking if you can please call her back.

## 2025-06-17 NOTE — TELEPHONE ENCOUNTER
We had to redo her [employer] account so it's pending.  I called her just now and gave her your direct number.

## 2025-06-17 NOTE — TELEPHONE ENCOUNTER
Pt called office. Had a hard time hearing her. She had said that she tried calling but she just keeps getting the answering service. Could you try to call her again?

## 2025-06-19 ENCOUNTER — TELEPHONE (OUTPATIENT)
Dept: PRIMARY CARE | Facility: CLINIC | Age: 75
End: 2025-06-19

## 2025-06-19 ASSESSMENT — ENCOUNTER SYMPTOMS
BELCHING: 1
ANOREXIA: 1
DIARRHEA: 1
FLATUS: 1
ABDOMINAL PAIN: 1

## 2025-06-19 NOTE — TELEPHONE ENCOUNTER
Pt states she has had watery bowel movements, excessive gas, no appetite for the approx 3 weeks. Pharmacist suggested Imodium.  Patiens wonders if she should just keep trying this medication or if she needs to be seen.

## 2025-06-20 ENCOUNTER — OFFICE VISIT (OUTPATIENT)
Dept: PRIMARY CARE | Facility: CLINIC | Age: 75
End: 2025-06-20
Payer: MEDICARE

## 2025-06-20 VITALS
HEIGHT: 65 IN | TEMPERATURE: 98.2 F | DIASTOLIC BLOOD PRESSURE: 64 MMHG | BODY MASS INDEX: 29.99 KG/M2 | SYSTOLIC BLOOD PRESSURE: 124 MMHG | WEIGHT: 180 LBS | OXYGEN SATURATION: 97 % | HEART RATE: 66 BPM

## 2025-06-20 DIAGNOSIS — E11.42 TYPE 2 DIABETES MELLITUS WITH DIABETIC POLYNEUROPATHY, WITHOUT LONG-TERM CURRENT USE OF INSULIN: ICD-10-CM

## 2025-06-20 DIAGNOSIS — R82.998 URINE LEUKOCYTES: ICD-10-CM

## 2025-06-20 DIAGNOSIS — I10 BENIGN ESSENTIAL HTN: Primary | ICD-10-CM

## 2025-06-20 DIAGNOSIS — J84.10 PULMONARY FIBROSIS, UNSPECIFIED (MULTI): ICD-10-CM

## 2025-06-20 DIAGNOSIS — K76.0 FATTY LIVER DISEASE, NONALCOHOLIC: ICD-10-CM

## 2025-06-20 DIAGNOSIS — G47.33 OSA ON CPAP: ICD-10-CM

## 2025-06-20 DIAGNOSIS — R56.9 SEIZURE (MULTI): ICD-10-CM

## 2025-06-20 DIAGNOSIS — E66.01 OBESITY, MORBID (MULTI): ICD-10-CM

## 2025-06-20 DIAGNOSIS — R30.0 DYSURIA: ICD-10-CM

## 2025-06-20 DIAGNOSIS — I45.10 RIGHT BUNDLE BRANCH BLOCK (RBBB) ON ELECTROCARDIOGRAPHY: ICD-10-CM

## 2025-06-20 DIAGNOSIS — R19.7 DIARRHEA, UNSPECIFIED TYPE: ICD-10-CM

## 2025-06-20 LAB
POC APPEARANCE, URINE: CLEAR
POC BILIRUBIN, URINE: NEGATIVE
POC BLOOD, URINE: NEGATIVE
POC COLOR, URINE: YELLOW
POC GLUCOSE, URINE: ABNORMAL MG/DL
POC KETONES, URINE: NEGATIVE MG/DL
POC LEUKOCYTES, URINE: ABNORMAL
POC NITRITE,URINE: NEGATIVE
POC PH, URINE: 5.5 PH
POC PROTEIN, URINE: ABNORMAL MG/DL
POC SPECIFIC GRAVITY, URINE: >=1.03
POC UROBILINOGEN, URINE: 0.2 EU/DL

## 2025-06-20 PROCEDURE — 1036F TOBACCO NON-USER: CPT | Performed by: NURSE PRACTITIONER

## 2025-06-20 PROCEDURE — 3044F HG A1C LEVEL LT 7.0%: CPT | Performed by: NURSE PRACTITIONER

## 2025-06-20 PROCEDURE — 1126F AMNT PAIN NOTED NONE PRSNT: CPT | Performed by: NURSE PRACTITIONER

## 2025-06-20 PROCEDURE — G2211 COMPLEX E/M VISIT ADD ON: HCPCS | Performed by: NURSE PRACTITIONER

## 2025-06-20 PROCEDURE — 99214 OFFICE O/P EST MOD 30 MIN: CPT | Performed by: NURSE PRACTITIONER

## 2025-06-20 PROCEDURE — 3008F BODY MASS INDEX DOCD: CPT | Performed by: NURSE PRACTITIONER

## 2025-06-20 PROCEDURE — 4010F ACE/ARB THERAPY RXD/TAKEN: CPT | Performed by: NURSE PRACTITIONER

## 2025-06-20 PROCEDURE — 3078F DIAST BP <80 MM HG: CPT | Performed by: NURSE PRACTITIONER

## 2025-06-20 PROCEDURE — 1159F MED LIST DOCD IN RCRD: CPT | Performed by: NURSE PRACTITIONER

## 2025-06-20 PROCEDURE — 3074F SYST BP LT 130 MM HG: CPT | Performed by: NURSE PRACTITIONER

## 2025-06-20 PROCEDURE — 1160F RVW MEDS BY RX/DR IN RCRD: CPT | Performed by: NURSE PRACTITIONER

## 2025-06-20 PROCEDURE — 81003 URINALYSIS AUTO W/O SCOPE: CPT | Performed by: NURSE PRACTITIONER

## 2025-06-20 RX ORDER — TIRZEPATIDE 10 MG/.5ML
INJECTION, SOLUTION SUBCUTANEOUS
COMMUNITY
Start: 2025-06-18 | End: 2025-06-20 | Stop reason: WASHOUT

## 2025-06-20 ASSESSMENT — ENCOUNTER SYMPTOMS
LOSS OF SENSATION IN FEET: 0
DEPRESSION: 0
DIARRHEA: 1
FLATUS: 1
ABDOMINAL PAIN: 1
OCCASIONAL FEELINGS OF UNSTEADINESS: 0
ANOREXIA: 1
BELCHING: 1

## 2025-06-20 ASSESSMENT — PATIENT HEALTH QUESTIONNAIRE - PHQ9
SUM OF ALL RESPONSES TO PHQ9 QUESTIONS 1 AND 2: 0
1. LITTLE INTEREST OR PLEASURE IN DOING THINGS: NOT AT ALL
2. FEELING DOWN, DEPRESSED OR HOPELESS: NOT AT ALL

## 2025-06-20 ASSESSMENT — LIFESTYLE VARIABLES: HOW MANY STANDARD DRINKS CONTAINING ALCOHOL DO YOU HAVE ON A TYPICAL DAY: PATIENT DOES NOT DRINK

## 2025-06-20 ASSESSMENT — PAIN SCALES - GENERAL: PAINLEVEL_OUTOF10: 0-NO PAIN

## 2025-06-20 NOTE — PROGRESS NOTES
Subjective   Patient ID: Yolanda Zabala is a 75 y.o. female who presents for Diarrhea (Diarrhea for three weeks.She has not had a solid bowel movement.).    Presents today with 3-week history of diarrhea.  She reports that she developed diarrhea approximately 3 weeks ago.  After about a week of the symptoms of loose stool and intermittent abdominal cramping she discussed this with a friend of hers who recommended she try Metamucil.  This worsened her symptoms and then she tried MiraLAX.  This also did not resolve her symptoms.  For the last few days she has been utilizing Imodium which has improved her symptoms.  She is currently having 1-2 loose stools a day.  She denies cramping with the diarrhea but does admit to feeling the need to go move her bowels when the diarrhea occurs.  She denies foul smell.  She denies blood in her stool.  She denies dark tarry stools.  She denies any change in medications.  She denies any new foods.  She denies elevated temperature.  Denies nausea or vomiting.  She denies recent illnesses.  Reports she is able to drink fluids without difficulty.  She has noted that her appetite has not been normal lately.  Of note she is taking Mounjaro she is at 10 mg.  Her dose was recently decreased but she has not started the new dose yet.  Monday, June 23 she will be taking her's first 7.5 mg dose.  She reports she has not had any bowel movements today.  * This note was partially generated using the Dragon Voice recognition system. There may be some incorrect wording, spelling and/or spelling errors or punctuation errors that were not corrected prior to committing the note to the medical record.*    Diarrhea   Associated symptoms include abdominal pain and increased flatus. Nothing aggravates the symptoms. There are no known risk factors. She has tried increased fluids (Metamucil and MiraLAX) for the symptoms. The treatment provided no relief.   Abdominal Pain  The current episode started 1 to 4  "weeks ago. Associated symptoms include anorexia, belching, diarrhea and flatus. The pain is aggravated by belching.        Review of Systems   Gastrointestinal:  Positive for abdominal pain, anorexia, diarrhea and flatus.       Objective   /64 (BP Location: Right arm, Patient Position: Sitting)   Pulse 66   Temp 36.8 °C (98.2 °F) (Temporal)   Ht 1.651 m (5' 5\")   Wt 81.6 kg (180 lb)   SpO2 97%   BMI 29.95 kg/m²     Physical Exam  Vitals reviewed.   Constitutional:       General: She is not in acute distress.     Appearance: Normal appearance.   HENT:      Head: Normocephalic.      Right Ear: Tympanic membrane and ear canal normal.      Left Ear: Tympanic membrane and ear canal normal.      Nose: Nose normal. No congestion.      Mouth/Throat:      Mouth: Mucous membranes are moist.      Pharynx: Oropharynx is clear.   Eyes:      General:         Right eye: No discharge.         Left eye: No discharge.      Extraocular Movements: Extraocular movements intact.      Conjunctiva/sclera: Conjunctivae normal.      Pupils: Pupils are equal, round, and reactive to light.   Cardiovascular:      Rate and Rhythm: Normal rate and regular rhythm.      Heart sounds: Normal heart sounds. No murmur heard.  Pulmonary:      Effort: Pulmonary effort is normal.      Breath sounds: Normal breath sounds. No wheezing or rhonchi.   Abdominal:      General: There is no distension.      Palpations: Abdomen is soft. There is no mass.      Tenderness: There is no abdominal tenderness. There is no guarding or rebound.   Musculoskeletal:         General: No swelling, tenderness or deformity. Normal range of motion.   Lymphadenopathy:      Cervical: No cervical adenopathy.   Skin:     General: Skin is warm and dry.      Coloration: Skin is not jaundiced.   Neurological:      General: No focal deficit present.      Mental Status: She is alert and oriented to person, place, and time.      Cranial Nerves: No cranial nerve deficit.      " Sensory: No sensory deficit.   Psychiatric:         Mood and Affect: Mood normal.         Speech: Speech normal.         Assessment/Plan   Problem List Items Addressed This Visit           ICD-10-CM    Benign essential HTN - Primary I10    Relevant Orders    CBC and Auto Differential    Comprehensive Metabolic Panel    Fatty liver disease, nonalcoholic K76.0    Relevant Orders    CBC and Auto Differential    Comprehensive Metabolic Panel    Right bundle branch block (RBBB) on electrocardiography I45.10    SIMRAN on CPAP G47.33    Obesity, morbid (Multi) E66.01    Pulmonary fibrosis, unspecified (Multi) J84.10    Relevant Orders    CBC and Auto Differential    Comprehensive Metabolic Panel    Seizure (Multi) R56.9    Type 2 diabetes mellitus with diabetic polyneuropathy, without long-term current use of insulin E11.42     Other Visit Diagnoses         Codes      Diarrhea, unspecified type     R19.7    Relevant Orders    CBC and Auto Differential    Comprehensive Metabolic Panel    Stool Pathogen Panel, PCR    C. difficile, PCR      Dysuria     R30.0    Relevant Orders    POCT UA Automated manually resulted (Completed)    Urine Culture    Microscopic Only, Urine      Urine leukocytes     R82.998    Relevant Orders    Urine Culture    Microscopic Only, Urine

## 2025-06-20 NOTE — PATIENT INSTRUCTIONS
Stop taking Metamucil and Miralax  Can use immodium after liquid bowel movement  Take specimens to Lab for analysis  Labwork obtained today.  Will address results when available  Try to drink plenty of water  Soft bland food to eat and increase as tolerated

## 2025-06-21 LAB
ALBUMIN SERPL-MCNC: 4 G/DL (ref 3.6–5.1)
ALP SERPL-CCNC: 129 U/L (ref 37–153)
ALT SERPL-CCNC: 29 U/L (ref 6–29)
ANION GAP SERPL CALCULATED.4IONS-SCNC: 11 MMOL/L (CALC) (ref 7–17)
AST SERPL-CCNC: 28 U/L (ref 10–35)
BASOPHILS # BLD AUTO: 76 CELLS/UL (ref 0–200)
BASOPHILS NFR BLD AUTO: 0.8 %
BILIRUB SERPL-MCNC: 0.7 MG/DL (ref 0.2–1.2)
BUN SERPL-MCNC: 38 MG/DL (ref 7–25)
CALCIUM SERPL-MCNC: 9.5 MG/DL (ref 8.6–10.4)
CHLORIDE SERPL-SCNC: 109 MMOL/L (ref 98–110)
CO2 SERPL-SCNC: 20 MMOL/L (ref 20–32)
CREAT SERPL-MCNC: 1.11 MG/DL (ref 0.6–1)
EGFRCR SERPLBLD CKD-EPI 2021: 52 ML/MIN/1.73M2
EOSINOPHIL # BLD AUTO: 912 CELLS/UL (ref 15–500)
EOSINOPHIL NFR BLD AUTO: 9.6 %
ERYTHROCYTE [DISTWIDTH] IN BLOOD BY AUTOMATED COUNT: 14.4 % (ref 11–15)
GLUCOSE SERPL-MCNC: 125 MG/DL (ref 65–99)
HCT VFR BLD AUTO: 43.4 % (ref 35–45)
HGB BLD-MCNC: 13.9 G/DL (ref 11.7–15.5)
LYMPHOCYTES # BLD AUTO: 2176 CELLS/UL (ref 850–3900)
LYMPHOCYTES NFR BLD AUTO: 22.9 %
MCH RBC QN AUTO: 30.4 PG (ref 27–33)
MCHC RBC AUTO-ENTMCNC: 32 G/DL (ref 32–36)
MCV RBC AUTO: 95 FL (ref 80–100)
MONOCYTES # BLD AUTO: 608 CELLS/UL (ref 200–950)
MONOCYTES NFR BLD AUTO: 6.4 %
NEUTROPHILS # BLD AUTO: 5729 CELLS/UL (ref 1500–7800)
NEUTROPHILS NFR BLD AUTO: 60.3 %
PLATELET # BLD AUTO: 237 THOUSAND/UL (ref 140–400)
PMV BLD REES-ECKER: 10.2 FL (ref 7.5–12.5)
POTASSIUM SERPL-SCNC: 4.4 MMOL/L (ref 3.5–5.3)
PROT SERPL-MCNC: 7.5 G/DL (ref 6.1–8.1)
RBC # BLD AUTO: 4.57 MILLION/UL (ref 3.8–5.1)
SODIUM SERPL-SCNC: 140 MMOL/L (ref 135–146)
WBC # BLD AUTO: 9.5 THOUSAND/UL (ref 3.8–10.8)

## 2025-06-22 LAB
BACTERIA #/AREA URNS HPF: ABNORMAL /HPF
BACTERIA UR CULT: ABNORMAL
HYALINE CASTS #/AREA URNS LPF: ABNORMAL /LPF
RBC #/AREA URNS HPF: ABNORMAL /HPF
SERVICE CMNT-IMP: ABNORMAL
SQUAMOUS #/AREA URNS HPF: ABNORMAL /HPF
WBC #/AREA URNS HPF: ABNORMAL /HPF

## 2025-07-11 ENCOUNTER — APPOINTMENT (OUTPATIENT)
Dept: PHARMACY | Facility: HOSPITAL | Age: 75
End: 2025-07-11
Payer: MEDICARE

## 2025-07-11 DIAGNOSIS — E11.9 TYPE 2 DIABETES MELLITUS WITHOUT COMPLICATION, WITHOUT LONG-TERM CURRENT USE OF INSULIN: ICD-10-CM

## 2025-07-11 NOTE — PROGRESS NOTES
Patient is sent at the request of Vannessa Lincoln for Type 2 diabetes 1 month follow up.  My final recommendations will be communicated back to the requesting provider by way of shared medical record.    Subjective   HPI    Current diet: well balanced  Current exercise: walking      Allergies[1]    Current monitoring regimen:   Patient is using: continuous glucose monitor  Mikhail 2  Uses reader  -Fasting sugars 100-120    Any episodes of hypoglycemia? No     Adverse Effects:   No adverse effects    Objective     There were no vitals taken for this visit.    Diabetes Pharmacotherapy:    Farxiga 10 mg once daily  Mounjaro 7.5 mg subQ once weekly    Secondary Prevention:   - Statin? Yes  - ACE-I/ARB? Yes  - Aspirin? No    Pertinent PMH Review:  - PMH of Pancreatitis: No  - PMH of Retinopathy: No  - PMH of Urinary Tract Infections: No  - PMH of MTC: No    Lab Review  Lab Results   Component Value Date    BILITOT 0.7 06/20/2025    CALCIUM 9.5 06/20/2025    CO2 20 06/20/2025     06/20/2025    CREATININE 1.11 (H) 06/20/2025    GLUCOSE 125 (H) 06/20/2025    ALKPHOS 129 06/20/2025    K 4.4 06/20/2025    PROT 7.5 06/20/2025     06/20/2025    AST 28 06/20/2025    ALT 29 06/20/2025    BUN 38 (H) 06/20/2025    ANIONGAP 11 06/20/2025    MG 1.56 (L) 09/24/2024    PHOS 3.6 01/27/2025    ALBUMIN 4.0 06/20/2025    LIPASE 53 09/19/2020    GFRF 62 08/11/2023     Lab Results   Component Value Date    TRIG 331 (H) 09/24/2024    CHOL 119 09/24/2024    LDLCALC 21 09/24/2024    HDL 31.8 09/24/2024     Lab Results   Component Value Date    HGBA1C 6.2 03/03/2025    HGBA1C 7.6 (A) 12/03/2024    HGBA1C 12.4 (A) 09/24/2024     The ASCVD Risk score (Ramonita STARK, et al., 2019) failed to calculate for the following reasons:    The valid total cholesterol range is 130 to 320 mg/dL        Assessment/Plan   Problem List Items Addressed This Visit    None      Patients diabetes is well controlled with most recent A1c of 6.2% (Goal <  7%). Patient reports diarrhea has resolved and she is not having low blood sugars anymore.   Continue:   Farxiga 10 mg once daily  Mounjaro 7.5 mg subQ once weekly  Compliance at present is estimated to be good.   Education Provided to Patient:   Mounjaro Education:   - Counseled patient on MOA, expectations, side effects, duration of therapy, contraindications, administration, and monitoring parameters  - Answered all patient questions and concerns  - Advised patient that they may experience improved satiety after meals and portion sizes of meals may be reduced as doses of Mounjaro increase.    Farxiga Education:  - Counseled patient on Farxiga MOA, expectations, side effects, duration of therapy, administration, and monitoring parameters.  - Reviewed the benefits of SGLT-2i therapy, such as glycemic control and kidney and CV protection.  - Advised patient to practice proper  hygiene to reduce risk of UTIs or yeast infections.  - Advised patient to maintain adequate fluid intake to remain hydrated while on SGLT2i therapy.  - Answered all patient questions and concerns.  -Patient stated she had a recent UTI that got better with antibiotics, discussed that if this happens again we should consider discontinuing Farxiga     Follow-up: I recommend diabetes care be prn   PCP Follow-Up: 2 months    Zainab Faye, PharmD, BCPS    Continue all meds under the continuation of care with the referring provider and clinical pharmacy team.           [1]   Allergies  Allergen Reactions    Ace Inhibitors Unknown    Bacitracin Unknown    Penicillins Unknown     can't remember    Phenytoin Unknown    Sulfamethoxazole-Trimethoprim Unknown

## 2025-07-18 ENCOUNTER — TELEPHONE (OUTPATIENT)
Dept: PRIMARY CARE | Facility: CLINIC | Age: 75
End: 2025-07-18
Payer: MEDICARE

## 2025-07-18 NOTE — TELEPHONE ENCOUNTER
Can try stopping the Mounjaro.  Keep an eye on blood sugars.  If they are elevating we will either need to restart the Mounjaro or start another medication to control blood sugars.

## 2025-07-18 NOTE — TELEPHONE ENCOUNTER
Pt called said she has been on mounjaro for about a year now and still has diarrhea everyday.  Wants to know if she should stop taking it for a week to see if that is what is causing the diarrhea.

## 2025-07-31 DIAGNOSIS — I10 BENIGN ESSENTIAL HTN: ICD-10-CM

## 2025-07-31 DIAGNOSIS — E78.5 HYPERLIPIDEMIA, UNSPECIFIED HYPERLIPIDEMIA TYPE: Primary | ICD-10-CM

## 2025-07-31 PROBLEM — R80.9 PROTEINURIA: Status: ACTIVE | Noted: 2025-07-31

## 2025-07-31 RX ORDER — COVID-19 VACCINE, MRNA 50 UG/.5ML
INJECTION, SUSPENSION INTRAMUSCULAR
COMMUNITY
Start: 2024-11-12

## 2025-07-31 RX ORDER — ATORVASTATIN CALCIUM 20 MG/1
20 TABLET, FILM COATED ORAL DAILY
Qty: 90 TABLET | Refills: 1 | Status: SHIPPED | OUTPATIENT
Start: 2025-07-31

## 2025-07-31 RX ORDER — FLUCONAZOLE 150 MG/1
TABLET ORAL
COMMUNITY
Start: 2024-08-03

## 2025-07-31 RX ORDER — LOSARTAN POTASSIUM 50 MG/1
50 TABLET ORAL 2 TIMES DAILY
Qty: 180 TABLET | Refills: 1 | Status: SHIPPED | OUTPATIENT
Start: 2025-07-31

## 2025-08-04 DIAGNOSIS — J30.9 ALLERGIC RHINITIS, UNSPECIFIED SEASONALITY, UNSPECIFIED TRIGGER: ICD-10-CM

## 2025-08-04 RX ORDER — MONTELUKAST SODIUM 10 MG/1
10 TABLET ORAL NIGHTLY
Qty: 30 TABLET | Refills: 0 | Status: SHIPPED | OUTPATIENT
Start: 2025-08-04

## 2025-08-07 DIAGNOSIS — Z00.00 ROUTINE GENERAL MEDICAL EXAMINATION AT HEALTH CARE FACILITY: ICD-10-CM

## 2025-08-07 DIAGNOSIS — J30.2 SEASONAL ALLERGIES: Primary | ICD-10-CM

## 2025-08-07 RX ORDER — CETIRIZINE HYDROCHLORIDE 10 MG/1
10 TABLET ORAL DAILY
Qty: 60 TABLET | Refills: 3 | Status: SHIPPED | OUTPATIENT
Start: 2025-08-07

## 2025-08-13 ENCOUNTER — TELEPHONE (OUTPATIENT)
Dept: PRIMARY CARE | Facility: CLINIC | Age: 75
End: 2025-08-13
Payer: MEDICARE

## 2025-08-14 DIAGNOSIS — E11.9 TYPE 2 DIABETES MELLITUS WITHOUT COMPLICATION, WITHOUT LONG-TERM CURRENT USE OF INSULIN: ICD-10-CM

## 2025-08-21 DIAGNOSIS — I10 BENIGN ESSENTIAL HTN: Primary | ICD-10-CM

## 2025-08-21 RX ORDER — METOPROLOL SUCCINATE 50 MG/1
50 TABLET, EXTENDED RELEASE ORAL DAILY
Qty: 30 TABLET | Refills: 5 | Status: SHIPPED | OUTPATIENT
Start: 2025-08-21 | End: 2026-02-17

## 2025-09-01 DIAGNOSIS — E11.9 TYPE 2 DIABETES MELLITUS WITHOUT COMPLICATION, WITHOUT LONG-TERM CURRENT USE OF INSULIN: ICD-10-CM

## 2025-09-02 RX ORDER — PNEUMOCOCCAL 21-VALENT CONJUGATE VACCINE 65; 4; 4; 4; 4; 4; 4; 4; 4; 4; 4; 4; 4; 4; 4; 4; 4; 4; 4; 4; 4; 4 UG/.5ML; UG/.5ML; UG/.5ML; UG/.5ML; UG/.5ML; UG/.5ML; UG/.5ML; UG/.5ML; UG/.5ML; UG/.5ML; UG/.5ML; UG/.5ML; UG/.5ML; UG/.5ML; UG/.5ML; UG/.5ML; UG/.5ML; UG/.5ML; UG/.5ML; UG/.5ML; UG/.5ML; UG/.5ML
INJECTION, SOLUTION INTRAMUSCULAR
COMMUNITY
Start: 2025-05-09

## 2025-09-02 RX ORDER — FLASH GLUCOSE SENSOR
KIT MISCELLANEOUS
Qty: 6 EACH | Refills: 0 | Status: SHIPPED | OUTPATIENT
Start: 2025-09-02

## 2025-09-04 DIAGNOSIS — J30.9 ALLERGIC RHINITIS, UNSPECIFIED SEASONALITY, UNSPECIFIED TRIGGER: ICD-10-CM

## 2025-09-04 RX ORDER — MONTELUKAST SODIUM 10 MG/1
10 TABLET ORAL NIGHTLY
Qty: 30 TABLET | Refills: 0 | Status: SHIPPED | OUTPATIENT
Start: 2025-09-04

## 2025-09-08 ENCOUNTER — APPOINTMENT (OUTPATIENT)
Dept: PRIMARY CARE | Facility: CLINIC | Age: 75
End: 2025-09-08
Payer: MEDICARE

## 2025-09-30 ENCOUNTER — APPOINTMENT (OUTPATIENT)
Dept: PRIMARY CARE | Facility: CLINIC | Age: 75
End: 2025-09-30
Payer: MEDICARE

## 2026-02-09 ENCOUNTER — APPOINTMENT (OUTPATIENT)
Dept: NEPHROLOGY | Facility: CLINIC | Age: 76
End: 2026-02-09
Payer: MEDICARE

## 2026-04-13 ENCOUNTER — APPOINTMENT (OUTPATIENT)
Dept: RHEUMATOLOGY | Facility: CLINIC | Age: 76
End: 2026-04-13
Payer: MEDICARE